# Patient Record
Sex: MALE | Race: WHITE | Employment: OTHER | URBAN - METROPOLITAN AREA
[De-identification: names, ages, dates, MRNs, and addresses within clinical notes are randomized per-mention and may not be internally consistent; named-entity substitution may affect disease eponyms.]

---

## 2017-01-31 ENCOUNTER — HOSPITAL ENCOUNTER (OUTPATIENT)
Dept: OTHER | Age: 77
Discharge: HOME | End: 2017-01-31
Attending: INTERNAL MEDICINE | Admitting: INTERNAL MEDICINE

## 2017-01-31 LAB
A/G RATIO: 1.2 (ref 1.1–2.2)
ALBUMIN SERPL-MCNC: 3.5 G/DL (ref 3.4–5)
ALP BLD-CCNC: 75 U/L (ref 40–129)
ALT SERPL-CCNC: 24 U/L (ref 10–40)
ANION GAP SERPL CALCULATED.3IONS-SCNC: 14 MMOL/L (ref 3–16)
AST SERPL-CCNC: 19 U/L (ref 15–37)
BILIRUB SERPL-MCNC: 1 MG/DL (ref 0–1)
BILIRUBIN URINE: ABNORMAL
BLOOD, URINE: NEGATIVE
BUN BLDV-MCNC: 10 MG/DL (ref 7–20)
CALCIUM SERPL-MCNC: 9 MG/DL (ref 8.3–10.6)
CHLORIDE BLD-SCNC: 96 MMOL/L (ref 99–110)
CHOLESTEROL, TOTAL: 167 MG/DL (ref 0–199)
CLARITY: ABNORMAL
CO2: 27 MMOL/L (ref 21–32)
COLOR: ABNORMAL
COMMENT UA: ABNORMAL
CREAT SERPL-MCNC: 0.9 MG/DL (ref 0.8–1.3)
EPITHELIAL CELLS, UA: 5 /HPF (ref 0–5)
GFR AFRICAN AMERICAN: >60
GFR NON-AFRICAN AMERICAN: >60
GLOBULIN: 3 G/DL
GLUCOSE BLD-MCNC: 308 MG/DL (ref 70–99)
GLUCOSE URINE: >=1000 MG/DL
HCT VFR BLD CALC: 49.3 % (ref 40.5–52.5)
HDLC SERPL-MCNC: 63 MG/DL (ref 40–60)
HEMOGLOBIN: 16.7 G/DL (ref 13.5–17.5)
KETONES, URINE: NEGATIVE MG/DL
LDL CHOLESTEROL CALCULATED: 77 MG/DL
LEUKOCYTE ESTERASE, URINE: ABNORMAL
MCH RBC QN AUTO: 30 PG (ref 26–34)
MCHC RBC AUTO-ENTMCNC: 33.9 G/DL (ref 31–36)
MCV RBC AUTO: 88.4 FL (ref 80–100)
MICROSCOPIC EXAMINATION: YES
NITRITE, URINE: NEGATIVE
PDW BLD-RTO: 13.3 % (ref 12.4–15.4)
PH UA: 5.5
PLATELET # BLD: 159 K/UL (ref 135–450)
PMV BLD AUTO: 11 FL (ref 5–10.5)
POTASSIUM SERPL-SCNC: 3.8 MMOL/L (ref 3.5–5.1)
PROSTATE SPECIFIC ANTIGEN: 1.19 NG/ML (ref 0–4)
PROTEIN UA: 30 MG/DL
RBC # BLD: 5.57 M/UL (ref 4.2–5.9)
RBC UA: 4 /HPF (ref 0–4)
SODIUM BLD-SCNC: 137 MMOL/L (ref 136–145)
SPECIFIC GRAVITY UA: 1.03
TOTAL PROTEIN: 6.5 G/DL (ref 6.4–8.2)
TRIGL SERPL-MCNC: 134 MG/DL (ref 0–150)
TSH SERPL DL<=0.05 MIU/L-ACNC: 11.65 UIU/ML (ref 0.27–4.2)
URINE TYPE: ABNORMAL
UROBILINOGEN, URINE: 0.2 E.U./DL
VITAMIN D 25-HYDROXY: 35.4 NG/ML
VLDLC SERPL CALC-MCNC: 27 MG/DL
WBC # BLD: 7.5 K/UL (ref 4–11)
WBC UA: 15 /HPF (ref 0–5)

## 2017-02-01 LAB
ESTIMATED AVERAGE GLUCOSE: 294.8 MG/DL
HBA1C MFR BLD: 11.9 %

## 2017-06-05 ENCOUNTER — HOSPITAL ENCOUNTER (OUTPATIENT)
Dept: OTHER | Age: 77
Discharge: HOME | End: 2017-06-05
Attending: INTERNAL MEDICINE | Admitting: INTERNAL MEDICINE

## 2017-06-05 LAB — TSH SERPL DL<=0.05 MIU/L-ACNC: 5.87 UIU/ML (ref 0.27–4.2)

## 2017-06-06 LAB
ESTIMATED AVERAGE GLUCOSE: 269 MG/DL
HBA1C MFR BLD: 11 %

## 2017-10-27 ENCOUNTER — HOSPITAL ENCOUNTER (OUTPATIENT)
Dept: OTHER | Age: 77
Discharge: HOME | End: 2017-10-27
Attending: INTERNAL MEDICINE | Admitting: INTERNAL MEDICINE

## 2017-10-27 LAB
A/G RATIO: 1.2 (ref 1.1–2.2)
ALBUMIN SERPL-MCNC: 3.7 G/DL (ref 3.4–5)
ALP BLD-CCNC: 77 U/L (ref 40–129)
ALT SERPL-CCNC: 22 U/L (ref 10–40)
ANION GAP SERPL CALCULATED.3IONS-SCNC: 14 MMOL/L (ref 3–16)
AST SERPL-CCNC: 15 U/L (ref 15–37)
BILIRUB SERPL-MCNC: 0.9 MG/DL (ref 0–1)
BILIRUBIN URINE: NEGATIVE
BLOOD, URINE: NEGATIVE
BUN BLDV-MCNC: 13 MG/DL (ref 7–20)
CALCIUM SERPL-MCNC: 9 MG/DL (ref 8.3–10.6)
CHLORIDE BLD-SCNC: 96 MMOL/L (ref 99–110)
CHOLESTEROL, TOTAL: 187 MG/DL (ref 0–199)
CLARITY: CLEAR
CO2: 27 MMOL/L (ref 21–32)
COLOR: YELLOW
CREAT SERPL-MCNC: 1.1 MG/DL (ref 0.8–1.3)
GFR AFRICAN AMERICAN: >60
GFR NON-AFRICAN AMERICAN: >60
GLOBULIN: 3.1 G/DL
GLUCOSE BLD-MCNC: 320 MG/DL (ref 70–99)
GLUCOSE URINE: >=1000 MG/DL
HDLC SERPL-MCNC: 56 MG/DL (ref 40–60)
KETONES, URINE: NEGATIVE MG/DL
LDL CHOLESTEROL CALCULATED: 103 MG/DL
LEUKOCYTE ESTERASE, URINE: NEGATIVE
MICROSCOPIC EXAMINATION: ABNORMAL
NITRITE, URINE: NEGATIVE
PH UA: 5.5
POTASSIUM SERPL-SCNC: 4 MMOL/L (ref 3.5–5.1)
PROSTATE SPECIFIC ANTIGEN: 1.08 NG/ML (ref 0–4)
PROTEIN UA: NEGATIVE MG/DL
SODIUM BLD-SCNC: 137 MMOL/L (ref 136–145)
SPECIFIC GRAVITY UA: 1.03
TOTAL PROTEIN: 6.8 G/DL (ref 6.4–8.2)
TRIGL SERPL-MCNC: 138 MG/DL (ref 0–150)
TSH SERPL DL<=0.05 MIU/L-ACNC: 6.39 UIU/ML (ref 0.27–4.2)
URINE TYPE: ABNORMAL
UROBILINOGEN, URINE: 0.2 E.U./DL
VLDLC SERPL CALC-MCNC: 28 MG/DL

## 2017-10-28 LAB
ESTIMATED AVERAGE GLUCOSE: 292 MG/DL
HBA1C MFR BLD: 11.8 %

## 2018-02-06 ENCOUNTER — HOSPITAL ENCOUNTER (OUTPATIENT)
Dept: OTHER | Age: 78
Discharge: HOME | End: 2018-02-06
Attending: INTERNAL MEDICINE | Admitting: INTERNAL MEDICINE

## 2018-02-07 LAB
ESTIMATED AVERAGE GLUCOSE: 246 MG/DL
HBA1C MFR BLD: 10.2 %

## 2018-05-20 PROBLEM — K81.0 ACUTE CHOLECYSTITIS: Status: ACTIVE | Noted: 2018-05-20

## 2018-05-20 PROBLEM — K85.10 ACUTE GALLSTONE PANCREATITIS: Status: ACTIVE | Noted: 2018-05-20

## 2018-05-20 PROBLEM — K21.9 GERD (GASTROESOPHAGEAL REFLUX DISEASE): Status: ACTIVE | Noted: 2018-05-20

## 2018-06-07 ENCOUNTER — OFFICE VISIT (OUTPATIENT)
Dept: SURGERY | Age: 78
End: 2018-06-07

## 2018-06-07 VITALS — SYSTOLIC BLOOD PRESSURE: 112 MMHG | WEIGHT: 223 LBS | DIASTOLIC BLOOD PRESSURE: 62 MMHG | BODY MASS INDEX: 31.1 KG/M2

## 2018-06-07 DIAGNOSIS — K81.0 ACUTE CHOLECYSTITIS: Primary | ICD-10-CM

## 2018-06-07 DIAGNOSIS — K85.10 ACUTE GALLSTONE PANCREATITIS: ICD-10-CM

## 2018-06-07 DIAGNOSIS — K81.0 ACUTE CHOLECYSTITIS: ICD-10-CM

## 2018-06-07 LAB
A/G RATIO: 1.2 (ref 1.1–2.2)
ALBUMIN SERPL-MCNC: 3.8 G/DL (ref 3.4–5)
ALP BLD-CCNC: 87 U/L (ref 40–129)
ALT SERPL-CCNC: 20 U/L (ref 10–40)
ANION GAP SERPL CALCULATED.3IONS-SCNC: 18 MMOL/L (ref 3–16)
AST SERPL-CCNC: <5 U/L (ref 15–37)
BASOPHILS ABSOLUTE: 0.2 K/UL (ref 0–0.2)
BASOPHILS RELATIVE PERCENT: 2.2 %
BILIRUB SERPL-MCNC: 0.8 MG/DL (ref 0–1)
BUN BLDV-MCNC: 17 MG/DL (ref 7–20)
CALCIUM SERPL-MCNC: 9.2 MG/DL (ref 8.3–10.6)
CHLORIDE BLD-SCNC: 91 MMOL/L (ref 99–110)
CO2: 25 MMOL/L (ref 21–32)
CREAT SERPL-MCNC: 1.1 MG/DL (ref 0.8–1.3)
EOSINOPHILS ABSOLUTE: 0.4 K/UL (ref 0–0.6)
EOSINOPHILS RELATIVE PERCENT: 4.1 %
GFR AFRICAN AMERICAN: >60
GFR NON-AFRICAN AMERICAN: >60
GLOBULIN: 3.3 G/DL
GLUCOSE BLD-MCNC: 308 MG/DL (ref 70–99)
HCT VFR BLD CALC: 48.8 % (ref 40.5–52.5)
HEMOGLOBIN: 16.9 G/DL (ref 13.5–17.5)
LIPASE: 90 U/L (ref 13–60)
LYMPHOCYTES ABSOLUTE: 2 K/UL (ref 1–5.1)
LYMPHOCYTES RELATIVE PERCENT: 23.1 %
MCH RBC QN AUTO: 31 PG (ref 26–34)
MCHC RBC AUTO-ENTMCNC: 34.7 G/DL (ref 31–36)
MCV RBC AUTO: 89.5 FL (ref 80–100)
MONOCYTES ABSOLUTE: 0.6 K/UL (ref 0–1.3)
MONOCYTES RELATIVE PERCENT: 6.4 %
NEUTROPHILS ABSOLUTE: 5.6 K/UL (ref 1.7–7.7)
NEUTROPHILS RELATIVE PERCENT: 64.2 %
PDW BLD-RTO: 13.6 % (ref 12.4–15.4)
PLATELET # BLD: 441 K/UL (ref 135–450)
PMV BLD AUTO: 9 FL (ref 5–10.5)
POTASSIUM SERPL-SCNC: 4.2 MMOL/L (ref 3.5–5.1)
RBC # BLD: 5.45 M/UL (ref 4.2–5.9)
SODIUM BLD-SCNC: 134 MMOL/L (ref 136–145)
TOTAL PROTEIN: 7.1 G/DL (ref 6.4–8.2)
WBC # BLD: 8.7 K/UL (ref 4–11)

## 2018-06-07 PROCEDURE — 99024 POSTOP FOLLOW-UP VISIT: CPT | Performed by: SURGERY

## 2018-08-15 ENCOUNTER — HOSPITAL ENCOUNTER (OUTPATIENT)
Dept: OTHER | Age: 78
Discharge: HOME | End: 2018-08-15
Attending: INTERNAL MEDICINE | Admitting: INTERNAL MEDICINE

## 2018-08-16 LAB
ESTIMATED AVERAGE GLUCOSE: 246 MG/DL
HBA1C MFR BLD: 10.2 %

## 2019-02-13 ENCOUNTER — HOSPITAL ENCOUNTER (OUTPATIENT)
Age: 79
Discharge: HOME | End: 2019-02-13
Payer: MEDICARE

## 2019-02-13 LAB
BILIRUBIN URINE: NEGATIVE
BLOOD, URINE: NEGATIVE
CHOLESTEROL, TOTAL: 181 MG/DL (ref 0–199)
CLARITY: ABNORMAL
COLOR: YELLOW
EPITHELIAL CELLS, UA: 5 /HPF (ref 0–5)
GLUCOSE URINE: NEGATIVE MG/DL
HCT VFR BLD CALC: 49.7 % (ref 40.5–52.5)
HDLC SERPL-MCNC: 56 MG/DL (ref 40–60)
HEMOGLOBIN: 17.2 G/DL (ref 13.5–17.5)
HYALINE CASTS: 6 /LPF (ref 0–8)
KETONES, URINE: NEGATIVE MG/DL
LDL CHOLESTEROL CALCULATED: 97 MG/DL
LEUKOCYTE ESTERASE, URINE: ABNORMAL
MCH RBC QN AUTO: 30.7 PG (ref 26–34)
MCHC RBC AUTO-ENTMCNC: 34.5 G/DL (ref 31–36)
MCV RBC AUTO: 89 FL (ref 80–100)
MICROSCOPIC EXAMINATION: YES
NITRITE, URINE: NEGATIVE
PDW BLD-RTO: 13.5 % (ref 12.4–15.4)
PH UA: 5.5
PLATELET # BLD: 204 K/UL (ref 135–450)
PMV BLD AUTO: 10 FL (ref 5–10.5)
PROSTATE SPECIFIC ANTIGEN: 1.66 NG/ML (ref 0–4)
PROTEIN UA: ABNORMAL MG/DL
RBC # BLD: 5.58 M/UL (ref 4.2–5.9)
RBC UA: 3 /HPF (ref 0–4)
SPECIFIC GRAVITY UA: 1.02
TRIGL SERPL-MCNC: 139 MG/DL (ref 0–150)
URINE TYPE: ABNORMAL
UROBILINOGEN, URINE: 0.2 E.U./DL
VITAMIN D 25-HYDROXY: 37.6 NG/ML
VLDLC SERPL CALC-MCNC: 28 MG/DL
WBC # BLD: 7.1 K/UL (ref 4–11)
WBC UA: 10 /HPF (ref 0–5)

## 2019-02-13 PROCEDURE — 83036 HEMOGLOBIN GLYCOSYLATED A1C: CPT

## 2019-02-13 PROCEDURE — 80053 COMPREHEN METABOLIC PANEL: CPT

## 2019-02-13 PROCEDURE — 84153 ASSAY OF PSA TOTAL: CPT

## 2019-02-13 PROCEDURE — 36415 COLL VENOUS BLD VENIPUNCTURE: CPT

## 2019-02-13 PROCEDURE — 85027 COMPLETE CBC AUTOMATED: CPT

## 2019-02-13 PROCEDURE — 81001 URINALYSIS AUTO W/SCOPE: CPT

## 2019-02-13 PROCEDURE — 80061 LIPID PANEL: CPT

## 2019-02-13 PROCEDURE — 82306 VITAMIN D 25 HYDROXY: CPT

## 2019-02-14 LAB
A/G RATIO: 1.2 (ref 1.1–2.2)
ALBUMIN SERPL-MCNC: 3.8 G/DL (ref 3.4–5)
ALP BLD-CCNC: 77 U/L (ref 40–129)
ALT SERPL-CCNC: 34 U/L (ref 10–40)
ANION GAP SERPL CALCULATED.3IONS-SCNC: 19 MMOL/L (ref 3–16)
AST SERPL-CCNC: 29 U/L (ref 15–37)
BILIRUB SERPL-MCNC: 1.5 MG/DL (ref 0–1)
BUN BLDV-MCNC: 11 MG/DL (ref 7–20)
CALCIUM SERPL-MCNC: 9.6 MG/DL (ref 8.3–10.6)
CHLORIDE BLD-SCNC: 94 MMOL/L (ref 99–110)
CO2: 23 MMOL/L (ref 21–32)
CREAT SERPL-MCNC: 1.1 MG/DL (ref 0.8–1.3)
ESTIMATED AVERAGE GLUCOSE: 203 MG/DL
GFR AFRICAN AMERICAN: >60
GFR NON-AFRICAN AMERICAN: >60
GLOBULIN: 3.1 G/DL
GLUCOSE BLD-MCNC: 94 MG/DL (ref 70–99)
HBA1C MFR BLD: 8.7 %
POTASSIUM SERPL-SCNC: 4.1 MMOL/L (ref 3.5–5.1)
SODIUM BLD-SCNC: 136 MMOL/L (ref 136–145)
TOTAL PROTEIN: 6.9 G/DL (ref 6.4–8.2)

## 2019-04-26 ENCOUNTER — HOSPITAL ENCOUNTER (OUTPATIENT)
Dept: GENERAL RADIOLOGY | Age: 79
Discharge: HOME | End: 2019-04-26
Payer: MEDICARE

## 2019-04-26 ENCOUNTER — HOSPITAL ENCOUNTER (OUTPATIENT)
Age: 79
Discharge: HOME | End: 2019-04-26
Payer: MEDICARE

## 2019-04-26 ENCOUNTER — HOSPITAL ENCOUNTER (OUTPATIENT)
Dept: VASCULAR LAB | Age: 79
Discharge: HOME | End: 2019-04-26
Payer: MEDICARE

## 2019-04-26 DIAGNOSIS — M51.36 LUMBAR DISC NARROWING: ICD-10-CM

## 2019-04-26 DIAGNOSIS — I82.403 DEEP VEIN THROMBOSIS (DVT) OF BOTH LOWER EXTREMITIES, UNSPECIFIED CHRONICITY, UNSPECIFIED VEIN (HCC): Primary | ICD-10-CM

## 2019-04-26 PROCEDURE — 93970 EXTREMITY STUDY: CPT

## 2019-04-26 PROCEDURE — 72100 X-RAY EXAM L-S SPINE 2/3 VWS: CPT

## 2019-05-02 ENCOUNTER — HOSPITAL ENCOUNTER (OUTPATIENT)
Dept: VASCULAR LAB | Age: 79
Discharge: HOME | End: 2019-05-02
Payer: MEDICARE

## 2019-05-02 DIAGNOSIS — I73.9 INTERMITTENT CLAUDICATION (HCC): Primary | ICD-10-CM

## 2019-05-02 PROCEDURE — 93923 UPR/LXTR ART STDY 3+ LVLS: CPT

## 2019-07-30 ENCOUNTER — HOSPITAL ENCOUNTER (OUTPATIENT)
Age: 79
Discharge: HOME | End: 2019-07-30
Payer: MEDICARE

## 2019-07-30 PROCEDURE — 83036 HEMOGLOBIN GLYCOSYLATED A1C: CPT

## 2019-07-30 PROCEDURE — 36415 COLL VENOUS BLD VENIPUNCTURE: CPT

## 2019-07-31 LAB
ESTIMATED AVERAGE GLUCOSE: 220.2 MG/DL
HBA1C MFR BLD: 9.3 %

## 2019-11-17 ENCOUNTER — APPOINTMENT (OUTPATIENT)
Dept: CT IMAGING | Age: 79
End: 2019-11-17
Payer: MEDICARE

## 2019-11-17 ENCOUNTER — HOSPITAL ENCOUNTER (EMERGENCY)
Age: 79
Discharge: HOME | End: 2019-11-17
Payer: MEDICARE

## 2019-11-17 VITALS
OXYGEN SATURATION: 99 % | BODY MASS INDEX: 31.46 KG/M2 | RESPIRATION RATE: 14 BRPM | SYSTOLIC BLOOD PRESSURE: 157 MMHG | HEART RATE: 82 BPM | DIASTOLIC BLOOD PRESSURE: 77 MMHG | TEMPERATURE: 98.4 F | WEIGHT: 232 LBS

## 2019-11-17 DIAGNOSIS — S01.01XA LACERATION OF SCALP, INITIAL ENCOUNTER: ICD-10-CM
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
## 2019-11-17 PROCEDURE — 72125 CT NECK SPINE W/O DYE: CPT

## 2019-11-17 PROCEDURE — 70450 CT HEAD/BRAIN W/O DYE: CPT

## 2019-11-17 PROCEDURE — 6370000000 HC RX 637 (ALT 250 FOR IP)

## 2019-11-17 PROCEDURE — 99284 EMERGENCY DEPT VISIT MOD MDM: CPT

## 2019-11-17 PROCEDURE — 4500000024 HC ED LEVEL 4 PROCEDURE

## 2019-11-17 PROCEDURE — 6370000000 HC RX 637 (ALT 250 FOR IP): Performed by: PHYSICIAN ASSISTANT

## 2019-11-17 RX ORDER — ONDANSETRON 4 MG/1
8 TABLET, ORALLY DISINTEGRATING ORAL ONCE
Status: COMPLETED | OUTPATIENT
Start: 2019-11-17 | End: 2019-11-17

## 2019-11-17 RX ORDER — HYDROCODONE BITARTRATE AND ACETAMINOPHEN 5; 325 MG/1; MG/1
1 TABLET ORAL ONCE
Status: COMPLETED | OUTPATIENT
Start: 2019-11-17 | End: 2019-11-17

## 2019-11-17 RX ADMIN — Medication 3 ML: at 13:20

## 2019-11-17 RX ADMIN — HYDROCODONE BITARTRATE AND ACETAMINOPHEN 1 TABLET: 5; 325 TABLET ORAL at 13:19

## 2019-11-17 RX ADMIN — ONDANSETRON 8 MG: 4 TABLET, ORALLY DISINTEGRATING ORAL at 13:19

## 2019-11-17 ASSESSMENT — ENCOUNTER SYMPTOMS
VOMITING: 0
DIARRHEA: 0
RESPIRATORY NEGATIVE: 1
COUGH: 0
COLOR CHANGE: 0
SHORTNESS OF BREATH: 0
ABDOMINAL PAIN: 0
CONSTIPATION: 0
NAUSEA: 0
PHOTOPHOBIA: 0

## 2019-11-17 ASSESSMENT — PAIN SCALES - GENERAL
PAINLEVEL_OUTOF10: 5
PAINLEVEL_OUTOF10: 5

## 2019-11-17 ASSESSMENT — PAIN DESCRIPTION - LOCATION: LOCATION: HEAD

## 2019-11-17 ASSESSMENT — PAIN DESCRIPTION - PAIN TYPE: TYPE: ACUTE PAIN

## 2020-08-18 ENCOUNTER — HOSPITAL ENCOUNTER (OUTPATIENT)
Age: 80
Discharge: HOME | End: 2020-08-18
Payer: MEDICARE

## 2020-08-18 LAB
A/G RATIO: 1.2 (ref 1.1–2.2)
ALBUMIN SERPL-MCNC: 4.2 G/DL (ref 3.4–5)
ALP BLD-CCNC: 84 U/L (ref 40–129)
ALT SERPL-CCNC: 30 U/L (ref 10–40)
ANION GAP SERPL CALCULATED.3IONS-SCNC: 11 MMOL/L (ref 3–16)
AST SERPL-CCNC: 23 U/L (ref 15–37)
BILIRUB SERPL-MCNC: 1.4 MG/DL (ref 0–1)
BILIRUBIN URINE: NEGATIVE
BLOOD, URINE: NEGATIVE
BUN BLDV-MCNC: 17 MG/DL (ref 7–20)
CALCIUM SERPL-MCNC: 9.4 MG/DL (ref 8.3–10.6)
CHLORIDE BLD-SCNC: 92 MMOL/L (ref 99–110)
CHOLESTEROL, TOTAL: 197 MG/DL (ref 0–199)
CLARITY: CLEAR
CO2: 31 MMOL/L (ref 21–32)
COLOR: YELLOW
CREAT SERPL-MCNC: 1 MG/DL (ref 0.8–1.3)
EPITHELIAL CELLS, UA: 1 /HPF (ref 0–5)
GFR AFRICAN AMERICAN: >60
GFR NON-AFRICAN AMERICAN: >60
GLOBULIN: 3.4 G/DL
GLUCOSE BLD-MCNC: 253 MG/DL (ref 70–99)
GLUCOSE URINE: NEGATIVE MG/DL
HCT VFR BLD CALC: 52.5 % (ref 40.5–52.5)
HDLC SERPL-MCNC: 63 MG/DL (ref 40–60)
HEMOGLOBIN: 17.8 G/DL (ref 13.5–17.5)
HYALINE CASTS: 1 /LPF (ref 0–8)
KETONES, URINE: NEGATIVE MG/DL
LDL CHOLESTEROL CALCULATED: 108 MG/DL
LEUKOCYTE ESTERASE, URINE: ABNORMAL
MCH RBC QN AUTO: 29.5 PG (ref 26–34)
MCHC RBC AUTO-ENTMCNC: 33.9 G/DL (ref 31–36)
MCV RBC AUTO: 87 FL (ref 80–100)
MICROSCOPIC EXAMINATION: YES
NITRITE, URINE: NEGATIVE
PDW BLD-RTO: 13.6 % (ref 12.4–15.4)
PH UA: 6 (ref 5–8)
PLATELET # BLD: 191 K/UL (ref 135–450)
PMV BLD AUTO: 10.4 FL (ref 5–10.5)
POTASSIUM SERPL-SCNC: 3.2 MMOL/L (ref 3.5–5.1)
PROSTATE SPECIFIC ANTIGEN: 1.55 NG/ML (ref 0–4)
PROTEIN UA: NEGATIVE MG/DL
RBC # BLD: 6.03 M/UL (ref 4.2–5.9)
RBC UA: 2 /HPF (ref 0–4)
SODIUM BLD-SCNC: 134 MMOL/L (ref 136–145)
SPECIFIC GRAVITY UA: 1.02 (ref 1–1.03)
TOTAL PROTEIN: 7.6 G/DL (ref 6.4–8.2)
TRIGL SERPL-MCNC: 130 MG/DL (ref 0–150)
URINE TYPE: ABNORMAL
UROBILINOGEN, URINE: 0.2 E.U./DL
VITAMIN D 25-HYDROXY: 31 NG/ML
VLDLC SERPL CALC-MCNC: 26 MG/DL
WBC # BLD: 7.4 K/UL (ref 4–11)
WBC UA: 6 /HPF (ref 0–5)

## 2020-08-18 PROCEDURE — 84153 ASSAY OF PSA TOTAL: CPT

## 2020-08-18 PROCEDURE — 81001 URINALYSIS AUTO W/SCOPE: CPT

## 2020-08-18 PROCEDURE — 85027 COMPLETE CBC AUTOMATED: CPT

## 2020-08-18 PROCEDURE — 82306 VITAMIN D 25 HYDROXY: CPT

## 2020-08-18 PROCEDURE — 80061 LIPID PANEL: CPT

## 2020-08-18 PROCEDURE — 80053 COMPREHEN METABOLIC PANEL: CPT

## 2020-10-16 ENCOUNTER — HOSPITAL ENCOUNTER (OUTPATIENT)
Age: 80
Discharge: HOME | End: 2020-10-16
Payer: MEDICARE

## 2020-10-16 LAB
ANION GAP SERPL CALCULATED.3IONS-SCNC: 11 MMOL/L (ref 3–16)
BUN BLDV-MCNC: 13 MG/DL (ref 7–20)
CALCIUM SERPL-MCNC: 9 MG/DL (ref 8.3–10.6)
CHLORIDE BLD-SCNC: 97 MMOL/L (ref 99–110)
CO2: 31 MMOL/L (ref 21–32)
CREAT SERPL-MCNC: 1 MG/DL (ref 0.8–1.3)
GFR AFRICAN AMERICAN: >60
GFR NON-AFRICAN AMERICAN: >60
GLUCOSE BLD-MCNC: 100 MG/DL (ref 70–99)
POTASSIUM SERPL-SCNC: 3.3 MMOL/L (ref 3.5–5.1)
SODIUM BLD-SCNC: 139 MMOL/L (ref 136–145)

## 2020-10-16 PROCEDURE — 36415 COLL VENOUS BLD VENIPUNCTURE: CPT

## 2020-10-16 PROCEDURE — 80048 BASIC METABOLIC PNL TOTAL CA: CPT

## 2020-10-16 PROCEDURE — 83036 HEMOGLOBIN GLYCOSYLATED A1C: CPT

## 2020-10-17 LAB
ESTIMATED AVERAGE GLUCOSE: 214.5 MG/DL
HBA1C MFR BLD: 9.1 %

## 2021-09-02 ENCOUNTER — HOSPITAL ENCOUNTER (OUTPATIENT)
Age: 81
Discharge: HOME | End: 2021-09-02
Payer: MEDICARE

## 2021-09-02 LAB
A/G RATIO: 1.3 (ref 1.1–2.2)
ALBUMIN SERPL-MCNC: 3.7 G/DL (ref 3.4–5)
ALP BLD-CCNC: 75 U/L (ref 40–129)
ALT SERPL-CCNC: 28 U/L (ref 10–40)
ANION GAP SERPL CALCULATED.3IONS-SCNC: 13 MMOL/L (ref 3–16)
AST SERPL-CCNC: 29 U/L (ref 15–37)
BILIRUB SERPL-MCNC: 1 MG/DL (ref 0–1)
BILIRUBIN URINE: NEGATIVE
BLOOD, URINE: NEGATIVE
BUN BLDV-MCNC: 14 MG/DL (ref 7–20)
CALCIUM SERPL-MCNC: 8.5 MG/DL (ref 8.3–10.6)
CHLORIDE BLD-SCNC: 98 MMOL/L (ref 99–110)
CHOLESTEROL, TOTAL: 155 MG/DL (ref 0–199)
CLARITY: CLEAR
CO2: 28 MMOL/L (ref 21–32)
COLOR: YELLOW
CREAT SERPL-MCNC: 1.1 MG/DL (ref 0.8–1.3)
GFR AFRICAN AMERICAN: >60
GFR NON-AFRICAN AMERICAN: >60
GLOBULIN: 2.9 G/DL
GLUCOSE BLD-MCNC: 88 MG/DL (ref 70–99)
GLUCOSE URINE: NEGATIVE MG/DL
HCT VFR BLD CALC: 47.6 % (ref 40.5–52.5)
HDLC SERPL-MCNC: 48 MG/DL (ref 40–60)
HEMOGLOBIN: 16.2 G/DL (ref 13.5–17.5)
KETONES, URINE: NEGATIVE MG/DL
LDL CHOLESTEROL CALCULATED: 83 MG/DL
LEUKOCYTE ESTERASE, URINE: NEGATIVE
MCH RBC QN AUTO: 30.4 PG (ref 26–34)
MCHC RBC AUTO-ENTMCNC: 34.1 G/DL (ref 31–36)
MCV RBC AUTO: 89.1 FL (ref 80–100)
MICROSCOPIC EXAMINATION: NORMAL
NITRITE, URINE: NEGATIVE
PDW BLD-RTO: 13.6 % (ref 12.4–15.4)
PH UA: 5.5 (ref 5–8)
PLATELET # BLD: 169 K/UL (ref 135–450)
PMV BLD AUTO: 9.7 FL (ref 5–10.5)
POTASSIUM SERPL-SCNC: 3.3 MMOL/L (ref 3.5–5.1)
PROSTATE SPECIFIC ANTIGEN: 1.38 NG/ML (ref 0–4)
PROTEIN UA: NEGATIVE MG/DL
RBC # BLD: 5.33 M/UL (ref 4.2–5.9)
SODIUM BLD-SCNC: 139 MMOL/L (ref 136–145)
SPECIFIC GRAVITY UA: 1.02 (ref 1–1.03)
TOTAL PROTEIN: 6.6 G/DL (ref 6.4–8.2)
TRIGL SERPL-MCNC: 122 MG/DL (ref 0–150)
TSH SERPL DL<=0.05 MIU/L-ACNC: 16.14 UIU/ML (ref 0.27–4.2)
URINE TYPE: NORMAL
UROBILINOGEN, URINE: 0.2 E.U./DL
VITAMIN D 25-HYDROXY: 25.8 NG/ML
VLDLC SERPL CALC-MCNC: 24 MG/DL
WBC # BLD: 6.4 K/UL (ref 4–11)

## 2021-09-02 PROCEDURE — 83036 HEMOGLOBIN GLYCOSYLATED A1C: CPT

## 2021-09-02 PROCEDURE — 82306 VITAMIN D 25 HYDROXY: CPT

## 2021-09-02 PROCEDURE — 80053 COMPREHEN METABOLIC PANEL: CPT

## 2021-09-02 PROCEDURE — 36415 COLL VENOUS BLD VENIPUNCTURE: CPT

## 2021-09-02 PROCEDURE — 81003 URINALYSIS AUTO W/O SCOPE: CPT

## 2021-09-02 PROCEDURE — 84443 ASSAY THYROID STIM HORMONE: CPT

## 2021-09-02 PROCEDURE — 84153 ASSAY OF PSA TOTAL: CPT

## 2021-09-02 PROCEDURE — 85027 COMPLETE CBC AUTOMATED: CPT

## 2021-09-02 PROCEDURE — 80061 LIPID PANEL: CPT

## 2021-09-03 LAB
ESTIMATED AVERAGE GLUCOSE: 180 MG/DL
HBA1C MFR BLD: 7.9 %

## 2022-07-06 PROBLEM — N40.1 BENIGN PROSTATIC HYPERPLASIA WITH URINARY FREQUENCY: Status: ACTIVE | Noted: 2022-07-06

## 2022-07-06 PROBLEM — R35.0 BENIGN PROSTATIC HYPERPLASIA WITH URINARY FREQUENCY: Status: ACTIVE | Noted: 2022-07-06

## 2022-07-27 ENCOUNTER — HOSPITAL ENCOUNTER (OUTPATIENT)
Age: 82
Discharge: HOME | End: 2022-07-27
Payer: MEDICARE

## 2022-07-27 LAB
A/G RATIO: 1.6 (ref 1.1–2.2)
ALBUMIN SERPL-MCNC: 4.1 G/DL (ref 3.4–5)
ALP BLD-CCNC: 68 U/L (ref 40–129)
ALT SERPL-CCNC: 50 U/L (ref 10–40)
ANION GAP SERPL CALCULATED.3IONS-SCNC: 10 MMOL/L (ref 3–16)
AST SERPL-CCNC: 32 U/L (ref 15–37)
BACTERIA: ABNORMAL /HPF
BILIRUB SERPL-MCNC: 1.2 MG/DL (ref 0–1)
BILIRUBIN URINE: NEGATIVE
BLOOD, URINE: NEGATIVE
BUN BLDV-MCNC: 17 MG/DL (ref 7–20)
CALCIUM SERPL-MCNC: 9.3 MG/DL (ref 8.3–10.6)
CHLORIDE BLD-SCNC: 97 MMOL/L (ref 99–110)
CHOLESTEROL, TOTAL: 287 MG/DL (ref 0–199)
CLARITY: CLEAR
CO2: 29 MMOL/L (ref 21–32)
COLOR: YELLOW
CREAT SERPL-MCNC: 1.2 MG/DL (ref 0.8–1.3)
EPITHELIAL CELLS, UA: 6 /HPF (ref 0–5)
GFR AFRICAN AMERICAN: >60
GFR NON-AFRICAN AMERICAN: 58
GLUCOSE BLD-MCNC: 240 MG/DL (ref 70–99)
GLUCOSE URINE: NEGATIVE MG/DL
HCT VFR BLD CALC: 48.1 % (ref 40.5–52.5)
HDLC SERPL-MCNC: 55 MG/DL (ref 40–60)
HEMOGLOBIN: 16.7 G/DL (ref 13.5–17.5)
HYALINE CASTS: 1 /LPF (ref 0–8)
KETONES, URINE: NEGATIVE MG/DL
LDL CHOLESTEROL CALCULATED: 197 MG/DL
LEUKOCYTE ESTERASE, URINE: ABNORMAL
MCH RBC QN AUTO: 31.1 PG (ref 26–34)
MCHC RBC AUTO-ENTMCNC: 34.8 G/DL (ref 31–36)
MCV RBC AUTO: 89.3 FL (ref 80–100)
MICROSCOPIC EXAMINATION: YES
NITRITE, URINE: NEGATIVE
PDW BLD-RTO: 13.6 % (ref 12.4–15.4)
PH UA: 5.5 (ref 5–8)
PLATELET # BLD: 184 K/UL (ref 135–450)
PMV BLD AUTO: 9.7 FL (ref 5–10.5)
POTASSIUM SERPL-SCNC: 4.6 MMOL/L (ref 3.5–5.1)
PROTEIN UA: ABNORMAL MG/DL
RBC # BLD: 5.39 M/UL (ref 4.2–5.9)
RBC UA: 2 /HPF (ref 0–4)
SODIUM BLD-SCNC: 136 MMOL/L (ref 136–145)
SPECIFIC GRAVITY UA: 1.02 (ref 1–1.03)
TOTAL PROTEIN: 6.7 G/DL (ref 6.4–8.2)
TRIGL SERPL-MCNC: 176 MG/DL (ref 0–150)
TSH SERPL DL<=0.05 MIU/L-ACNC: 6.36 UIU/ML (ref 0.27–4.2)
URINE TYPE: ABNORMAL
UROBILINOGEN, URINE: 1 E.U./DL
VLDLC SERPL CALC-MCNC: 35 MG/DL
WBC # BLD: 5.3 K/UL (ref 4–11)
WBC UA: 1 /HPF (ref 0–5)

## 2022-07-27 PROCEDURE — 83036 HEMOGLOBIN GLYCOSYLATED A1C: CPT

## 2022-07-27 PROCEDURE — 84443 ASSAY THYROID STIM HORMONE: CPT

## 2022-07-27 PROCEDURE — 81001 URINALYSIS AUTO W/SCOPE: CPT

## 2022-07-27 PROCEDURE — 80053 COMPREHEN METABOLIC PANEL: CPT

## 2022-07-27 PROCEDURE — 85027 COMPLETE CBC AUTOMATED: CPT

## 2022-07-27 PROCEDURE — 80061 LIPID PANEL: CPT

## 2022-07-28 LAB
ESTIMATED AVERAGE GLUCOSE: 165.7 MG/DL
HBA1C MFR BLD: 7.4 %

## 2022-09-15 ENCOUNTER — APPOINTMENT (OUTPATIENT)
Dept: CT IMAGING | Age: 82
End: 2022-09-15
Payer: MEDICARE

## 2022-09-15 ENCOUNTER — HOSPITAL ENCOUNTER (OUTPATIENT)
Age: 82
Setting detail: OBSERVATION
Discharge: HOME | End: 2022-09-17
Attending: EMERGENCY MEDICINE | Admitting: INTERNAL MEDICINE
Payer: MEDICARE

## 2022-09-15 ENCOUNTER — APPOINTMENT (OUTPATIENT)
Dept: GENERAL RADIOLOGY | Age: 82
End: 2022-09-15
Payer: MEDICARE

## 2022-09-15 DIAGNOSIS — R77.8 ELEVATED TROPONIN: ICD-10-CM

## 2022-09-15 DIAGNOSIS — R42 DIZZINESS: Primary | ICD-10-CM

## 2022-09-15 DIAGNOSIS — I65.23 STENOSIS OF BOTH INTERNAL CAROTID ARTERIES: ICD-10-CM

## 2022-09-15 LAB
A/G RATIO: 1.4 (ref 1.1–2.2)
ALBUMIN SERPL-MCNC: 3.9 G/DL (ref 3.4–5)
ALP BLD-CCNC: 60 U/L (ref 40–129)
ALT SERPL-CCNC: 37 U/L (ref 10–40)
AMPHETAMINE SCREEN, URINE: NORMAL
ANION GAP SERPL CALCULATED.3IONS-SCNC: 11 MMOL/L (ref 3–16)
APTT: 32.6 SEC (ref 23–34.3)
AST SERPL-CCNC: 40 U/L (ref 15–37)
BARBITURATE SCREEN URINE: NORMAL
BASOPHILS ABSOLUTE: 0.1 K/UL (ref 0–0.2)
BASOPHILS RELATIVE PERCENT: 2.7 %
BENZODIAZEPINE SCREEN, URINE: NORMAL
BILIRUB SERPL-MCNC: 1.1 MG/DL (ref 0–1)
BILIRUBIN URINE: NEGATIVE
BLOOD, URINE: NEGATIVE
BUN BLDV-MCNC: 16 MG/DL (ref 7–20)
CALCIUM SERPL-MCNC: 8.9 MG/DL (ref 8.3–10.6)
CANNABINOID SCREEN URINE: NORMAL
CHLORIDE BLD-SCNC: 103 MMOL/L (ref 99–110)
CLARITY: CLEAR
CO2: 26 MMOL/L (ref 21–32)
COCAINE METABOLITE SCREEN URINE: NORMAL
COLOR: YELLOW
CREAT SERPL-MCNC: 1 MG/DL (ref 0.8–1.3)
EOSINOPHILS ABSOLUTE: 0.4 K/UL (ref 0–0.6)
EOSINOPHILS RELATIVE PERCENT: 8.5 %
ETHANOL: NORMAL MG/DL (ref 0–0.08)
FENTANYL SCREEN, URINE: NORMAL
GFR AFRICAN AMERICAN: >60
GFR NON-AFRICAN AMERICAN: >60
GLUCOSE BLD-MCNC: 134 MG/DL (ref 70–99)
GLUCOSE BLD-MCNC: 152 MG/DL (ref 70–99)
GLUCOSE BLD-MCNC: 98 MG/DL (ref 70–99)
GLUCOSE URINE: NEGATIVE MG/DL
HCT VFR BLD CALC: 46.6 % (ref 40.5–52.5)
HEMOGLOBIN: 15.5 G/DL (ref 13.5–17.5)
INR BLD: 1.1 (ref 0.87–1.14)
KETONES, URINE: NEGATIVE MG/DL
LEUKOCYTE ESTERASE, URINE: NEGATIVE
LYMPHOCYTES ABSOLUTE: 1.4 K/UL (ref 1–5.1)
LYMPHOCYTES RELATIVE PERCENT: 29.2 %
Lab: NORMAL
MCH RBC QN AUTO: 29.8 PG (ref 26–34)
MCHC RBC AUTO-ENTMCNC: 33.3 G/DL (ref 31–36)
MCV RBC AUTO: 89.4 FL (ref 80–100)
METHADONE SCREEN, URINE: NORMAL
MICROSCOPIC EXAMINATION: NORMAL
MONOCYTES ABSOLUTE: 0.3 K/UL (ref 0–1.3)
MONOCYTES RELATIVE PERCENT: 6.8 %
NEUTROPHILS ABSOLUTE: 2.5 K/UL (ref 1.7–7.7)
NEUTROPHILS RELATIVE PERCENT: 52.8 %
NITRITE, URINE: NEGATIVE
OPIATE SCREEN URINE: NORMAL
OXYCODONE URINE: NORMAL
PDW BLD-RTO: 13.5 % (ref 12.4–15.4)
PERFORMED ON: ABNORMAL
PERFORMED ON: NORMAL
PH UA: 5
PH UA: 5 (ref 5–8)
PHENCYCLIDINE SCREEN URINE: NORMAL
PLATELET # BLD: 182 K/UL (ref 135–450)
PMV BLD AUTO: 8.7 FL (ref 5–10.5)
POTASSIUM SERPL-SCNC: 3.8 MMOL/L (ref 3.5–5.1)
PRO-BNP: 221 PG/ML (ref 0–449)
PROTEIN UA: NEGATIVE MG/DL
PROTHROMBIN TIME: 14.1 SEC (ref 11.7–14.5)
RBC # BLD: 5.21 M/UL (ref 4.2–5.9)
SODIUM BLD-SCNC: 140 MMOL/L (ref 136–145)
SPECIFIC GRAVITY UA: >=1.03 (ref 1–1.03)
TOTAL PROTEIN: 6.7 G/DL (ref 6.4–8.2)
TROPONIN: 0.02 NG/ML
URINE REFLEX TO CULTURE: NORMAL
URINE TYPE: NORMAL
UROBILINOGEN, URINE: 0.2 E.U./DL
WBC # BLD: 4.7 K/UL (ref 4–11)

## 2022-09-15 PROCEDURE — 84484 ASSAY OF TROPONIN QUANT: CPT

## 2022-09-15 PROCEDURE — 80307 DRUG TEST PRSMV CHEM ANLYZR: CPT

## 2022-09-15 PROCEDURE — 96374 THER/PROPH/DIAG INJ IV PUSH: CPT

## 2022-09-15 PROCEDURE — G0378 HOSPITAL OBSERVATION PER HR: HCPCS

## 2022-09-15 PROCEDURE — 85610 PROTHROMBIN TIME: CPT

## 2022-09-15 PROCEDURE — 6360000002 HC RX W HCPCS: Performed by: INTERNAL MEDICINE

## 2022-09-15 PROCEDURE — 70496 CT ANGIOGRAPHY HEAD: CPT

## 2022-09-15 PROCEDURE — 80053 COMPREHEN METABOLIC PANEL: CPT

## 2022-09-15 PROCEDURE — 99285 EMERGENCY DEPT VISIT HI MDM: CPT

## 2022-09-15 PROCEDURE — 6370000000 HC RX 637 (ALT 250 FOR IP): Performed by: EMERGENCY MEDICINE

## 2022-09-15 PROCEDURE — 71045 X-RAY EXAM CHEST 1 VIEW: CPT

## 2022-09-15 PROCEDURE — 6360000002 HC RX W HCPCS: Performed by: EMERGENCY MEDICINE

## 2022-09-15 PROCEDURE — 6370000000 HC RX 637 (ALT 250 FOR IP): Performed by: PHYSICIAN ASSISTANT

## 2022-09-15 PROCEDURE — 93005 ELECTROCARDIOGRAM TRACING: CPT | Performed by: EMERGENCY MEDICINE

## 2022-09-15 PROCEDURE — 36415 COLL VENOUS BLD VENIPUNCTURE: CPT

## 2022-09-15 PROCEDURE — 85025 COMPLETE CBC W/AUTO DIFF WBC: CPT

## 2022-09-15 PROCEDURE — 83880 ASSAY OF NATRIURETIC PEPTIDE: CPT

## 2022-09-15 PROCEDURE — 70450 CT HEAD/BRAIN W/O DYE: CPT

## 2022-09-15 PROCEDURE — 96372 THER/PROPH/DIAG INJ SC/IM: CPT

## 2022-09-15 PROCEDURE — 81003 URINALYSIS AUTO W/O SCOPE: CPT

## 2022-09-15 PROCEDURE — 6360000004 HC RX CONTRAST MEDICATION: Performed by: EMERGENCY MEDICINE

## 2022-09-15 PROCEDURE — 82077 ASSAY SPEC XCP UR&BREATH IA: CPT

## 2022-09-15 PROCEDURE — 85730 THROMBOPLASTIN TIME PARTIAL: CPT

## 2022-09-15 RX ORDER — TAMSULOSIN HYDROCHLORIDE 0.4 MG/1
0.4 CAPSULE ORAL DAILY
Status: DISCONTINUED | OUTPATIENT
Start: 2022-09-15 | End: 2022-09-17 | Stop reason: HOSPADM

## 2022-09-15 RX ORDER — ASPIRIN 81 MG/1
324 TABLET, CHEWABLE ORAL ONCE
Status: COMPLETED | OUTPATIENT
Start: 2022-09-15 | End: 2022-09-15

## 2022-09-15 RX ORDER — MECLIZINE HCL 12.5 MG/1
12.5 TABLET ORAL 3 TIMES DAILY PRN
Status: DISCONTINUED | OUTPATIENT
Start: 2022-09-15 | End: 2022-09-17 | Stop reason: HOSPADM

## 2022-09-15 RX ORDER — SENNA PLUS 8.6 MG/1
2 TABLET ORAL DAILY
Status: DISCONTINUED | OUTPATIENT
Start: 2022-09-15 | End: 2022-09-17 | Stop reason: HOSPADM

## 2022-09-15 RX ORDER — DIAZEPAM 5 MG/1
5 TABLET ORAL ONCE
Status: COMPLETED | OUTPATIENT
Start: 2022-09-15 | End: 2022-09-15

## 2022-09-15 RX ORDER — LEVOTHYROXINE SODIUM 0.1 MG/1
100 TABLET ORAL DAILY
Status: DISCONTINUED | OUTPATIENT
Start: 2022-09-16 | End: 2022-09-17 | Stop reason: HOSPADM

## 2022-09-15 RX ORDER — ENOXAPARIN SODIUM 100 MG/ML
30 INJECTION SUBCUTANEOUS DAILY
Status: DISCONTINUED | OUTPATIENT
Start: 2022-09-15 | End: 2022-09-17 | Stop reason: HOSPADM

## 2022-09-15 RX ORDER — DOCUSATE SODIUM 100 MG/1
300 CAPSULE, LIQUID FILLED ORAL DAILY
Status: DISCONTINUED | OUTPATIENT
Start: 2022-09-15 | End: 2022-09-17 | Stop reason: HOSPADM

## 2022-09-15 RX ORDER — METOLAZONE 2.5 MG/1
10 TABLET ORAL DAILY
Status: DISCONTINUED | OUTPATIENT
Start: 2022-09-15 | End: 2022-09-17 | Stop reason: HOSPADM

## 2022-09-15 RX ORDER — HYDROCHLOROTHIAZIDE 25 MG/1
25 TABLET ORAL DAILY
Status: DISCONTINUED | OUTPATIENT
Start: 2022-09-15 | End: 2022-09-16 | Stop reason: ALTCHOICE

## 2022-09-15 RX ORDER — ASPIRIN 81 MG/1
81 TABLET, CHEWABLE ORAL DAILY
Status: DISCONTINUED | OUTPATIENT
Start: 2022-09-15 | End: 2022-09-17 | Stop reason: HOSPADM

## 2022-09-15 RX ORDER — CLOTRIMAZOLE AND BETAMETHASONE DIPROPIONATE 10; .64 MG/G; MG/G
CREAM TOPICAL 2 TIMES DAILY
Status: DISCONTINUED | OUTPATIENT
Start: 2022-09-15 | End: 2022-09-16 | Stop reason: ALTCHOICE

## 2022-09-15 RX ORDER — ATORVASTATIN CALCIUM 40 MG/1
40 TABLET, FILM COATED ORAL DAILY
Status: DISCONTINUED | OUTPATIENT
Start: 2022-09-15 | End: 2022-09-17 | Stop reason: HOSPADM

## 2022-09-15 RX ORDER — MONTELUKAST SODIUM 10 MG/1
10 TABLET ORAL DAILY
Status: DISCONTINUED | OUTPATIENT
Start: 2022-09-15 | End: 2022-09-17 | Stop reason: HOSPADM

## 2022-09-15 RX ORDER — MECLIZINE HCL 12.5 MG/1
25 TABLET ORAL ONCE
Status: COMPLETED | OUTPATIENT
Start: 2022-09-15 | End: 2022-09-15

## 2022-09-15 RX ORDER — SPIRONOLACTONE 25 MG/1
50 TABLET ORAL DAILY
Status: DISCONTINUED | OUTPATIENT
Start: 2022-09-15 | End: 2022-09-17 | Stop reason: HOSPADM

## 2022-09-15 RX ORDER — PANTOPRAZOLE SODIUM 40 MG/1
40 TABLET, DELAYED RELEASE ORAL
Status: DISCONTINUED | OUTPATIENT
Start: 2022-09-16 | End: 2022-09-17 | Stop reason: HOSPADM

## 2022-09-15 RX ORDER — ATENOLOL 50 MG/1
50 TABLET ORAL DAILY
Status: DISCONTINUED | OUTPATIENT
Start: 2022-09-15 | End: 2022-09-16 | Stop reason: ALTCHOICE

## 2022-09-15 RX ORDER — ERGOCALCIFEROL 1.25 MG/1
50000 CAPSULE ORAL WEEKLY
Status: DISCONTINUED | OUTPATIENT
Start: 2022-09-15 | End: 2022-09-16

## 2022-09-15 RX ORDER — ONDANSETRON 2 MG/ML
4 INJECTION INTRAMUSCULAR; INTRAVENOUS
Status: DISCONTINUED | OUTPATIENT
Start: 2022-09-15 | End: 2022-09-17 | Stop reason: HOSPADM

## 2022-09-15 RX ADMIN — MECLIZINE 25 MG: 12.5 TABLET ORAL at 15:24

## 2022-09-15 RX ADMIN — IOPAMIDOL 75 ML: 755 INJECTION, SOLUTION INTRAVENOUS at 14:43

## 2022-09-15 RX ADMIN — ENOXAPARIN SODIUM 30 MG: 100 INJECTION SUBCUTANEOUS at 23:17

## 2022-09-15 RX ADMIN — ONDANSETRON 4 MG: 2 INJECTION INTRAMUSCULAR; INTRAVENOUS at 15:54

## 2022-09-15 RX ADMIN — ASPIRIN 324 MG: 81 TABLET, CHEWABLE ORAL at 15:54

## 2022-09-15 RX ADMIN — DIAZEPAM 5 MG: 5 TABLET ORAL at 15:54

## 2022-09-15 ASSESSMENT — ENCOUNTER SYMPTOMS
BACK PAIN: 0
DIARRHEA: 0
CONSTIPATION: 0
WHEEZING: 0
SHORTNESS OF BREATH: 0
COUGH: 0
ABDOMINAL PAIN: 0
NAUSEA: 1
VOMITING: 0
STRIDOR: 0
COLOR CHANGE: 0

## 2022-09-15 NOTE — PROGRESS NOTES
Pt admitted to 3373. Alert and oriented x 4, c/o dizziness and nausea with position changes. Ambulated to BR and back to bed with contact guard assistance. Daughter and son-in-law at bedside. Bed in lowest position and locked. Reviewed plan of care. Dr. Ervin Caro at bedside.

## 2022-09-15 NOTE — ED PROVIDER NOTES
I independently saw performed a substantive portion of the visit (history, physical, and MDM) on [place]. All diagnostic, treatment, and disposition decisions were made by myself in conjunction with the advanced practice provider. I have participated in the medical decision making and directed the treatment plan and disposition of the patient. For further details of [facility] emergency department encounter, please see the advanced practice provider's documentation. Olu Gonzales MD, am the primary physician provider of record. CHIEF COMPLAINT  Chief Complaint   Patient presents with    Dizziness    Nausea     Pt reports extreme dizziness and nausea, onset 6am this morning. Briefly, Anya Agudelo is a 81 y.o. male  who presents to the ED complaining of dizziness and imbalance, though not room-spinning per se. No LOC. Nausea but has yet to vomit. No headache with it but feels \"strange. \"  Never felt this way before. No chest or abd pain. No numbness or weakness in the arms or legs. No trouble with speech or swallowing. Went to bed at 0430 without sx, and noticed them when going to bathroom at 0600 or so. FOCUSED PHYSICAL EXAMINATION  BP (!) 155/68   Pulse 54   Temp 97.6 °F (36.4 °C) (Oral)   Resp 18   Wt 232 lb (105.2 kg)   SpO2 94%   BMI 31.46 kg/m²    Focused physical examination notable for no acute distress, well-appearing, well-nourished, normal speech and mentation without obvious facial droop, no obvious rash. No obvious cranial nerve deficits on my initial exam. PERRL EOMI, rotary nystagmus noted, no vertical skew deviation, no focal motor/sensory deficits in extremities, somewhat imbalanced and unsteady with standing. Reg rhythm, bradycardia, CTAB. No dysmetria/ataxia on FNF testing. NIH Stroke Scale  Interval: Baseline  Level of Consciousness (1a): Alert  LOC Questions (1b):  Answers both correctly  LOC Commands (1c): Performs both tasks correctly  Best Gaze (2): Normal  Visual (3): No visual loss  Facial Palsy (4): Normal symmetrical movement  Motor Arm, Left (5a): No drift  Motor Arm, Right (5b): No drift  Motor Leg, Left (6a): No drift  Motor Leg, Right (6b): No drift  Limb Ataxia (7): Absent  Sensory (8): Normal  Best Language (9): No aphasia  Dysarthria (10): Normal  Extinction and Inattention (11): No abnormality  Total: 0      The 12 lead EKG was interpreted by me as follows:  Rate: bradycardia with a rate of 53  Rhythm: sinus  Axis: normal  Intervals: first degree AVB narrow QRS normal QTc  ST segments: no ST elevations or depressions  T waves: no abnormal inversions  Non-specific T wave changes: not present  Prior EKG comparison: EKG dated 5/20/18 is not significantly different      MDM:  Diagnostic considerations included benign positional vertigo, labyrinthitis/otitis, sepsis, dehydration/orthostasis, vasovagal reaction, stroke, TIA, intracranial bleed, migraine, anxiety, ACS/dysrhythmia, medication side effects, head trauma    ED course was notable for concern for posterior circulation syndrome with onset of dizziness lasting 24 hours with imbalance associated with it and better nystagmus on my exam with an NIH of 0. Due to timeline he is not candidate for thrombolytics but stroke alert protocols were still underwent with a nonacute head CT and CTA showing stenotic disease but no LVO or bleed. He will be treated symptomatically and also given aspirin. His minimal troponin elevation could also be mildly elevated in the setting of stroke given his symptoms and not really consistent with ACS at all. EKG is sinus bradycardia and has a stable first deg AVB. Is this patient to be included in the SEP-1 Core Measure? No   Exclusion criteria - the patient is NOT to be included for SEP-1 Core Measure due to:   Infection is not suspected      During the patient's ED course, the patient was given:  Medications   ondansetron ([place] Amass) injection 4 mg (4 mg IntraVENous Given 9/15/22 1554)   meclizine (ANTIVERT) tablet 25 mg (25 mg Oral Given 9/15/22 1524)   iopamidol (ISOVUE-370) 76 % injection 75 mL (75 mLs IntraVENous Given 9/15/22 1443)   diazePAM (VALIUM) tablet 5 mg (5 mg Oral Given 9/15/22 1554)   aspirin chewable tablet 324 mg (324 mg Oral Given 9/15/22 1554)        CLINICAL IMPRESSION  1. Dizziness    2. Elevated troponin    3. Stenosis of both internal carotid arteries        DISPOSITION  Asia Bentley was admitted in fair condition. The plan is to admit to the hospital at this time under the PCP's admitting service. Dr. Adams Villegas accepted the patient and will take over the patient's care. The total critical care time I independently spent while evaluating and treating this patient was 35 minutes. This excludes time spent doing separately billable procedures. This includes time at the bedside, data interpretation, medication management, obtaining critical history from collateral sources if the patient is unable to provide it directly, and physician consultation. Specifics of interventions taken and potentially life-threatening diagnostic considerations are listed above in the medical decision making. If this was a shared visit with an DEJUAN, the time in this attestation is non-concurrent critical care time out of the total shared critical care time provided by the DEJUAN and myself. This chart was created using Dragon dictation software. Efforts were made by me to ensure accuracy, however some errors may be present due to limitations of this technology.               Emily Gardner MD  09/15/22 3880

## 2022-09-15 NOTE — ED PROVIDER NOTES
[facility]        Pt Name: Saundra Bergman  MRN: [id number]  [place] 1940  Date of evaluation: 9/15/2022  Provider: Nichelle Saul PA-C  PCP: Ayden Fernández MD  Note Started: 2:36 PM EDT        I have seen and evaluated this patient with my supervising physician Nasreen Suarez MD.    [address] [place] [address]       Chief Complaint   Patient presents with    Dizziness    Nausea     Pt reports extreme dizziness and nausea, onset 6am this morning. HISTORY OF PRESENT ILLNESS   (Location, Timing/Onset, Context/Setting, Quality, Duration, Modifying Factors, Severity, Associated Signs and Symptoms)  Note limiting factors. Chief Complaint: Dizziness    Saundra Bergman is a 81 y.o. male who presents to the emergency department stating that he went to bed around 4:30 AM.  He woke up around 6 AM to use the restroom and felt very unsteady on his feet like he was walking on a boat. He denies any pain associated with this. This makes him nauseated. He reports a tingling sensation all over his face, chest and upper extremities. Denies any weakness or focal numbness. Denies chest pain, shortness of breath or abdominal pain. Denies illicit drug use or alcohol abuse. Denies history of vertigo. When he stands from a seated position, he feels increasingly dizzy. Nursing Notes were all reviewed and agreed with or any disagreements were addressed in the HPI. REVIEW OF SYSTEMS    (2-9 systems for level 4, 10 or more for level 5)     Review of Systems   Constitutional:  Negative for chills and fever. HENT: Negative. Eyes:  Negative for visual disturbance. Respiratory:  Negative for cough, shortness of breath, wheezing and stridor. Cardiovascular:  Negative for chest pain, palpitations and leg swelling. Gastrointestinal:  Positive for nausea. Negative for abdominal pain, constipation, diarrhea and vomiting. drift  Limb Ataxia (7): Absent  Sensory (8): Normal  Best Language (9): No aphasia  Dysarthria (10): Normal  Extinction and Inattention (11): No abnormality  Total: 0Glasgow Coma Scale  Eye Opening: Spontaneous  Best Verbal Response: Oriented  Best Motor Response: Obeys commands  [place] Coma Scale Score: 15        PHYSICAL EXAM    (up to 7 for level 4, 8 or more for level 5)     ED Triage Vitals [09/15/22 1419]   BP Temp Temp Source Heart Rate Resp SpO2 Height Weight   (!) 155/68 97.6 °F (36.4 °C) Oral 54 18 94 % -- 232 lb (105.2 kg)       Physical Exam  Vitals and nursing note reviewed. Constitutional:       Appearance: Normal appearance. He is well-developed. He is obese. He is not diaphoretic. HENT:      Head: Normocephalic and atraumatic. Right Ear: Tympanic membrane, ear canal and external ear normal.      Left Ear: Tympanic membrane, ear canal and external ear normal.      Nose: Nose normal.      Mouth/Throat:      Mouth: Mucous membranes are moist.      Pharynx: Oropharynx is clear. Eyes:      General: No scleral icterus. Right eye: No discharge. Left eye: No discharge. Extraocular Movements: Extraocular movements intact. Conjunctiva/sclera: Conjunctivae normal.      Pupils: Pupils are equal, round, and reactive to light. Comments: Pinpoint pupils   Neck:      Trachea: Trachea and phonation normal.      Meningeal: Brudzinski's sign and Kernig's sign absent. Cardiovascular:      Rate and Rhythm: Normal rate. Pulmonary:      Effort: Pulmonary effort is normal.      Breath sounds: Normal breath sounds. Abdominal:      General: Bowel sounds are normal.      Palpations: Abdomen is soft. Tenderness: no abdominal tenderness   Musculoskeletal:         General: Normal range of motion. Cervical back: Full passive range of motion without pain, normal range of motion and neck supple. Lymphadenopathy:      Cervical: No cervical adenopathy.    Skin:     General: Skin is warm and dry. Capillary Refill: Capillary refill takes less than 2 seconds. Coloration: Skin is not jaundiced or pale. Findings: No bruising, erythema, lesion or rash. Neurological:      General: No focal deficit present. Mental Status: He is alert and oriented to person, place, and time. Cranial Nerves: No cranial nerve deficit (II-XII intact). Sensory: No sensory deficit. Motor: No weakness. Comments: No pronator drift, facial droop or slurred speech. Normal finger to nose coordination. Normal rapid alternating hand movement. Normal heel to shin coordination  Modified Matilda Hallpike makes dizziness worse without nystagmus. Normal test of skew  5 out of 5 strength in all 4 extremities without focal weakness, paresthesia or radiculopathy. Subjective tingling sensation on the face, chest and bilateral upper extremities. Psychiatric:         Behavior: Behavior normal.       DIAGNOSTIC RESULTS   LABS:    Labs Reviewed   COMPREHENSIVE METABOLIC PANEL - Abnormal; Notable for the following components:       Result Value    Glucose 152 (*)     Total Bilirubin 1.1 (*)     AST 40 (*)     All other components within normal limits   TROPONIN - Abnormal; Notable for the following components:    Troponin 0.02 (*)     All other components within normal limits   POCT GLUCOSE - Abnormal; Notable for the following components:    POC Glucose 134 (*)     All other components within normal limits   CBC WITH AUTO DIFFERENTIAL   APTT   PROTIME-INR   ETHANOL   BRAIN NATRIURETIC PEPTIDE   URINALYSIS WITH REFLEX TO CULTURE   URINE DRUG SCREEN       When ordered only abnormal lab results are displayed. All other labs were within normal range or not returned as of this dictation. EKG: When ordered, EKG's are interpreted by the Emergency Department Physician in the absence of a cardiologist.  Please see their note for interpretation of EKG.     RADIOLOGY:   Non-plain film images such as CT, Ultrasound and MRI are read by the radiologist. Plain radiographic images are visualized and preliminarily interpreted by the ED Provider with the below findings:        Interpretation per the Radiologist below, if available at the time of this note:    XR CHEST PORTABLE   Final Result   No radiographic evidence of acute pulmonary disease. CTA HEAD NECK W CONTRAST   Final Result   75% stenosis of the cavernous/supraclinoid segment of the left internal   carotid artery with heavy calcified plaque. 40% stenosis in the proximal left internal carotid artery. 30% stenosis at the origin of right internal carotid artery. 40% stenosis at the origins of the bilateral vertebral arteries. RECOMMENDATIONS:   Unavailable         CT HEAD WO CONTRAST   Final Result   No acute intracranial abnormality. PROCEDURES   Unless otherwise noted below, none     Procedures    CRITICAL CARE TIME   There was a high probability of life-threatening clinical deterioration in the patient's condition requiring my urgent intervention. I personally saw the patient and independently provided 55 minutes of non-concurrent critical care out of the total shared critical care time provided, excluding separately reportable procedures. CONSULTS:    IP CONSULT TO STROKE TEAM  IP CONSULT TO INTERNAL MEDICINE  I spoke with Dr Qing Banks  stroke team at [phone]. Does not recommend TPA.  Not candidate for other emergent neurology intervention at this time     Dr Brigid Hines will admit    EMERGENCY DEPARTMENT COURSE and DIFFERENTIAL DIAGNOSIS/MDM:   Vitals:    Vitals:    09/15/22 1419   BP: (!) 155/68   Pulse: 54   Resp: 18   Temp: 97.6 °F (36.4 °C)   TempSrc: Oral   SpO2: 94%   Weight: 232 lb (105.2 kg)       Patient was given the following medications:  Medications   ondansetron (ZOFRAN) injection 4 mg (4 mg IntraVENous Given 9/15/22 1554)   meclizine (ANTIVERT) tablet 25 mg (25 mg Oral Given 9/15/22 1524)   iopamidol (ISOVUE-370) 76 % injection 75 mL (75 mLs IntraVENous Given 9/15/22 1443)   diazePAM (VALIUM) tablet 5 mg (5 mg Oral Given 9/15/22 1554)   aspirin chewable tablet 324 mg (324 mg Oral Given 9/15/22 1554)         Is this patient to be included in the SEP-1 Core Measure due to severe sepsis or septic shock? No   Exclusion criteria - the patient is NOT to be included for SEP-1 Core Measure due to: Infection is not suspected    This patient presents complaining of dizziness. It seems to be very positional however CTA does show bilateral internal carotid stenosis and does have a mild elevation in his troponin. We cannot completely exclude posterior stroke. After consultation with  stroke team, they advised for admission at our facility. They do not advise for tPA and he is not a candidate for further intervention at this time. He has no chest pain or shortness of breath. He has chronic ekg findings. Patient understands and agrees with plan for admission. Dr. Nidhi Roa will admit. FINAL IMPRESSION      1. Dizziness    2. Elevated troponin    3. Stenosis of both internal carotid arteries          DISPOSITION/PLAN   DISPOSITION Decision To Admit 09/15/2022 04:16:40 PM      PATIENT REFERRED TO:  No follow-up provider specified.     DISCHARGE MEDICATIONS:  New Prescriptions    No medications on file       DISCONTINUED MEDICATIONS:  Discontinued Medications    No medications on file              (Please note that portions of this note were completed with a voice recognition program.  Efforts were made to edit the dictations but occasionally words are mis-transcribed.)    Alphonso Person PA-C (electronically signed)           Alphonso Person PA-C  09/15/22 5839

## 2022-09-16 PROBLEM — K21.9 GERD (GASTROESOPHAGEAL REFLUX DISEASE): Status: RESOLVED | Noted: 2018-05-20 | Resolved: 2022-09-16

## 2022-09-16 PROBLEM — I65.23 STENOSIS OF BOTH INTERNAL CAROTID ARTERIES: Status: ACTIVE | Noted: 2022-09-16

## 2022-09-16 PROBLEM — K85.10 ACUTE GALLSTONE PANCREATITIS: Status: RESOLVED | Noted: 2018-05-20 | Resolved: 2022-09-16

## 2022-09-16 PROBLEM — K81.0 ACUTE CHOLECYSTITIS: Status: RESOLVED | Noted: 2018-05-20 | Resolved: 2022-09-16

## 2022-09-16 PROBLEM — I65.22 MORE THAN 50 PERCENT STENOSIS OF LEFT INTERNAL CAROTID ARTERY: Status: ACTIVE | Noted: 2022-09-16

## 2022-09-16 PROBLEM — R00.1 BRADYCARDIA: Status: ACTIVE | Noted: 2022-09-16

## 2022-09-16 PROBLEM — I63.9 CEREBROVASCULAR ACCIDENT (CVA) (HCC): Status: ACTIVE | Noted: 2022-09-16

## 2022-09-16 LAB
EKG ATRIAL RATE: 53 BPM
EKG DIAGNOSIS: NORMAL
EKG P-R INTERVAL: 326 MS
EKG Q-T INTERVAL: 432 MS
EKG QRS DURATION: 90 MS
EKG QTC CALCULATION (BAZETT): 405 MS
EKG R AXIS: 9 DEGREES
EKG T AXIS: 66 DEGREES
EKG VENTRICULAR RATE: 53 BPM
GLUCOSE BLD-MCNC: 132 MG/DL (ref 70–99)
GLUCOSE BLD-MCNC: 143 MG/DL (ref 70–99)
GLUCOSE BLD-MCNC: 154 MG/DL (ref 70–99)
GLUCOSE BLD-MCNC: 213 MG/DL (ref 70–99)
GLUCOSE BLD-MCNC: 79 MG/DL (ref 70–99)
LV EF: 60 %
LVEF MODALITY: NORMAL
PERFORMED ON: ABNORMAL
PERFORMED ON: NORMAL

## 2022-09-16 PROCEDURE — 6370000000 HC RX 637 (ALT 250 FOR IP): Performed by: INTERNAL MEDICINE

## 2022-09-16 PROCEDURE — 93306 TTE W/DOPPLER COMPLETE: CPT

## 2022-09-16 PROCEDURE — G0378 HOSPITAL OBSERVATION PER HR: HCPCS

## 2022-09-16 PROCEDURE — 93010 ELECTROCARDIOGRAM REPORT: CPT | Performed by: INTERNAL MEDICINE

## 2022-09-16 PROCEDURE — 92526 ORAL FUNCTION THERAPY: CPT

## 2022-09-16 PROCEDURE — APPNB30 APP NON BILLABLE TIME 0-30 MINS: Performed by: NURSE PRACTITIONER

## 2022-09-16 PROCEDURE — 97116 GAIT TRAINING THERAPY: CPT

## 2022-09-16 PROCEDURE — 92610 EVALUATE SWALLOWING FUNCTION: CPT

## 2022-09-16 PROCEDURE — 6360000002 HC RX W HCPCS: Performed by: INTERNAL MEDICINE

## 2022-09-16 PROCEDURE — 99220 PR INITIAL OBSERVATION CARE/DAY 70 MINUTES: CPT | Performed by: PSYCHIATRY & NEUROLOGY

## 2022-09-16 PROCEDURE — 92523 SPEECH SOUND LANG COMPREHEN: CPT

## 2022-09-16 PROCEDURE — 97535 SELF CARE MNGMENT TRAINING: CPT

## 2022-09-16 PROCEDURE — 97162 PT EVAL MOD COMPLEX 30 MIN: CPT

## 2022-09-16 PROCEDURE — APPSS60 APP SPLIT SHARED TIME 46-60 MINUTES: Performed by: NURSE PRACTITIONER

## 2022-09-16 PROCEDURE — 96372 THER/PROPH/DIAG INJ SC/IM: CPT

## 2022-09-16 PROCEDURE — 97166 OT EVAL MOD COMPLEX 45 MIN: CPT

## 2022-09-16 RX ORDER — IBUPROFEN 200 MG
200 TABLET ORAL EVERY 6 HOURS PRN
COMMUNITY

## 2022-09-16 RX ORDER — PANTOPRAZOLE SODIUM 40 MG/1
40 TABLET, DELAYED RELEASE ORAL DAILY
COMMUNITY

## 2022-09-16 RX ORDER — ERGOCALCIFEROL 1.25 MG/1
50000 CAPSULE ORAL
Status: DISCONTINUED | OUTPATIENT
Start: 2022-10-14 | End: 2022-09-17 | Stop reason: HOSPADM

## 2022-09-16 RX ORDER — GABAPENTIN 100 MG/1
100 CAPSULE ORAL NIGHTLY
COMMUNITY

## 2022-09-16 RX ORDER — INSULIN LISPRO 100 [IU]/ML
0-8 INJECTION, SOLUTION INTRAVENOUS; SUBCUTANEOUS
Status: DISCONTINUED | OUTPATIENT
Start: 2022-09-16 | End: 2022-09-17 | Stop reason: HOSPADM

## 2022-09-16 RX ORDER — INSULIN LISPRO 100 [IU]/ML
0-4 INJECTION, SOLUTION INTRAVENOUS; SUBCUTANEOUS NIGHTLY
Status: DISCONTINUED | OUTPATIENT
Start: 2022-09-16 | End: 2022-09-17 | Stop reason: HOSPADM

## 2022-09-16 RX ORDER — POTASSIUM CHLORIDE 750 MG/1
10 CAPSULE, EXTENDED RELEASE ORAL 2 TIMES DAILY
COMMUNITY

## 2022-09-16 RX ADMIN — MONTELUKAST SODIUM 10 MG: 10 TABLET, FILM COATED ORAL at 09:45

## 2022-09-16 RX ADMIN — ATORVASTATIN CALCIUM 40 MG: 40 TABLET, FILM COATED ORAL at 09:45

## 2022-09-16 RX ADMIN — LEVOTHYROXINE SODIUM 100 MCG: 0.1 TABLET ORAL at 09:50

## 2022-09-16 RX ADMIN — METOLAZONE 10 MG: 2.5 TABLET ORAL at 09:45

## 2022-09-16 RX ADMIN — SPIRONOLACTONE 50 MG: 25 TABLET ORAL at 09:45

## 2022-09-16 RX ADMIN — INSULIN LISPRO 2 UNITS: 100 INJECTION, SOLUTION INTRAVENOUS; SUBCUTANEOUS at 13:00

## 2022-09-16 RX ADMIN — SENNOSIDES 17.2 MG: 8.6 TABLET, COATED ORAL at 09:45

## 2022-09-16 RX ADMIN — DOCUSATE SODIUM 300 MG: 100 CAPSULE, LIQUID FILLED ORAL at 09:45

## 2022-09-16 RX ADMIN — ASPIRIN 81 MG 81 MG: 81 TABLET ORAL at 09:45

## 2022-09-16 RX ADMIN — TAMSULOSIN HYDROCHLORIDE 0.4 MG: 0.4 CAPSULE ORAL at 09:45

## 2022-09-16 RX ADMIN — PANTOPRAZOLE SODIUM 40 MG: 40 TABLET, DELAYED RELEASE ORAL at 10:00

## 2022-09-16 RX ADMIN — ENOXAPARIN SODIUM 30 MG: 100 INJECTION SUBCUTANEOUS at 09:45

## 2022-09-16 ASSESSMENT — PAIN DESCRIPTION - LOCATION: LOCATION: BACK

## 2022-09-16 ASSESSMENT — PAIN SCALES - GENERAL: PAINLEVEL_OUTOF10: 5

## 2022-09-16 ASSESSMENT — PAIN DESCRIPTION - PAIN TYPE: TYPE: CHRONIC PAIN

## 2022-09-16 NOTE — PROGRESS NOTES
Patient Active Problem List   Diagnosis    HTN (hypertension)    Dyslipidemia    Diabetes mellitus (HCC)    Knee osteoarthritis    Benign prostatic hyperplasia with urinary frequency    Dysequilibrium    More than 50 percent stenosis of left internal carotid artery    Bradycardia   H&P dictated

## 2022-09-16 NOTE — CONSULTS
SURGERY      RETINAL LASER         Allergies   Allergen Reactions    Pcn [Penicillins] Anaphylaxis and Swelling    Quinine Other (See Comments) and Swelling     Body sores. Body sores. 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    Drug use: No    Sexual activity: Not on file   Other Topics Concern    Not on file   Social History Narrative     , 3 kids ,   at [employer]  Now retired      Non smoker non drinker      Social Determinants of Health     Financial Resource Strain: Not on file   Food Insecurity: Not on file   Transportation Needs: Not on file   Physical Activity: Not on file   Stress: Not on file   Social Connections: Not on file   Intimate Partner Violence: Not on file   Housing Stability: Not on file       Family History   Problem Relation Age of Onset    Heart Disease Maternal Grandmother     Heart Disease Maternal Grandfather     Heart Disease Paternal Grandmother     Heart Disease Paternal Grandfather      - No history of bleeding or clotting disorders    Vital Signs  Vitals:    22 0424 22 0735 22 0822 22 0905   BP: 106/65 133/69  (!) 132/57   Pulse: 50 52  54   Resp:    Temp: 98.2 °F (36.8 °C) 97.7 °F (36.5 °C)  98.1 °F (36.7 °C)   TempSrc: Oral Oral  Oral   SpO2: 96% 93%  95%   Weight:   223 lb 1.6 oz (101.2 kg)    Height:           Physical Examination  General: no apparent distress, appears heavy calcified plaque. 40% stenosis in the proximal left internal carotid artery. 30% stenosis at the origin of right internal carotid artery. 40% stenosis at the origins of the bilateral vertebral arteries. CT head w/o 9/15/22:  Impression   No acute intracranial abnormality. 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 The area of 75% stenosis is in the cavernous/supraclinoid segment of left ICA therefore would recommend neurosurgery consult. Continue ASA and statin therapy. MRI brain pending. Recommend repeat carotid duplex in 1 year for continued surveillance. Will d/w Dr. Kayden Washington and further recommendations to follow. Thank you for the consultation. Patient educated on plan of care and disease process. All questions answered. Electronically signed by SUNSHINE Dunn CNP on 9/16/2022 at 11:21 AM     In conjunction with the advance practice clinician I independently performed a history and physical examination of Bong Gomez. Based on my evaluation the following findings were present:    Per my exam patient has no neuro deficits. He is hard of hearing. He quit smoking in the 80s. He does not consume alcohol. He states that he takes two aspirin everyday in addition to statin therapy. He apparently had an episode of dizziness and could not get his balance. His dizziness appears to have resolved. He underwent stroke workup which included CTA which suggests very distal intracranial left ICA stenosis with no hemodynamically significant stenosis in the remainder of his cervical vessels.  Location is too distal from a surgical perspective for a Vascular surgeon to treat; however unlikely that this stenosis is symptomatic at this time. Formally recommend neurosurgical discussion should this lesion become symptomatic in the future. For now continue with maximal medical therapy. Nothing further from Vascular surgery, will sign off. Please call with questions or concerns. Greater than 30 minutes of my time of which 50% of that time was spent counseling the patient/family about the condition's pathology and proposed/planned treatment as well as reviewing radiologic imaging and other relevant patient data. In addition I performed greater than 50% of the above history and/or physical examination.     Arlene Barcenas DO, FSVS, [facility] Endovascular [facility]

## 2022-09-16 NOTE — PROGRESS NOTES
[facility] [address]   Phone: [phone]    Physical Therapy    [x] Initial Evaluation            [] Daily Treatment Note         [] Discharge Summary      Patient: Asia Bentley   : 1940   MRN: [id number]   Date of Service:  2022  Admitting Diagnosis: Dysequilibrium  Current Admission Summary: per ED Provider Note 9/15 from [facility], \"patient presents complaining of dizziness. It seems to be very positional however CTA does show bilateral internal carotid stenosis and does have a mild elevation in his troponin. We cannot completely exclude posterior stroke. After consultation with  stroke team, they advised for admission at our facility. They do not advise for tPA and he is not a candidate for further intervention at this time. He has no chest pain or shortness of breath. He has chronic ekg findings. Patient understands and agrees with plan for admission. Dr. Adams Villegas will admit. \"  Past Medical History:  has a past medical history of Arthritis, Cancer ([address] Chronic kidney disease, Diabetes mellitus ([address] GERD (gastroesophageal reflux disease), Glaucoma, Hyperlipidemia, Hypertension, Irregular heart beat, Psychiatric problem, Reflux, Thyroid disease, Ulcerative colitis ([address] and Vitamin D deficiency. Past Surgical History:  has a past surgical history that includes Prostate surgery; Cataract removal; Knee arthroscopy (1973); eye surgery; Colonoscopy; joint replacement (2013); Hemorrhoid surgery; retinal laser; knee surgery (Left, [id number]); knee surgery (Left, ); and Cholecystectomy, laparoscopic (2018). Discharge Recommendations: Asia Bentley scored a 19/24 on the AM-PAC short mobility form. Current research shows that an AM-PAC score of 18 or greater is typically associated with a discharge to the patient's home setting.  Based on the patient's AM-PAC score and their current functional mobility deficits, it is recommended that the patient have 2-3 sessions per week of Physical Therapy at d/c to increase the patient's independence. At this time, this patient demonstrates the endurance to discharge home, however if pt's dizziness is still present at discharge, recommend follow-up with OP PT for vestibular evaluation. Also recommend DME including shower chair, grab bars in bathroom, and RW if dizziness is present at discharge. If all symptoms are resolved by medical team at discharge, pt with no need for further therapy or DME at discharge. Please see assessment section for further patient specific details. If patient discharges prior to next session this note will serve as a discharge summary. Please see below for the latest assessment towards goals. DME Required For Discharge: DME to be determined pending patient progress    Precautions/Restrictions: high fall risk, modified diet, . Comment: 3 carb  Weight Bearing Restrictions: no restrictions  [] Right Upper Extremity  [] Left Upper Extremity [] Right Lower Extremity  [] Left Lower Extremity     Required Braces/Orthotics: no braces required   [] Right  [] Left  Positional Restrictions:no positional restrictions    Pre-Admission Information   Lives With: alone       Patient's son lives 2 doors down, son has aspergers and patient spends the night at his apartment and then helps him get up in the morning and helps him go to work. Son works full time in IT. Type of Home: condo  Home Layout: two level, able to live on main level  Home Access:  1 step to enter without rails   Bathroom Layout: walk in shower  Bathroom Equipment: .   Comment: no equipment, showers at son's condo   Toilet Height: standard height  Home Equipment: no prior equipment  Transfer Assistance: Independent without use of device  Ambulation Assistance:Independent without use of device  ADL Assistance: independent with all ADL's  IADL Assistance: independent with homemaking tasks  Active :        [x] Yes                 [] No  Hand Dominance: [] Left                 [x] Right  Current Employment: retired. Occupation:   Hobbies: cares for son, watches TV  Recent Falls: Patient stated he has not fallen \"completely\" but falls a lot into walls and furniture since yesterday. Stated fell against objects multiple times. Examination   Vision:   Vision Gross Assessment: WFL and Unable to formally test secondary to patient stated \"I feel drunk when I close my eyes, like the room is spinning\"    Oculomotor Examination:    Spontaneous Nystagmus:    []Yes   [x]No      Direction:    Gaze Holding Nystagmus:    []Yes     [x]No     []Direction fixed    []Direction changing  Eye Movement Range:     [x]Conjugate     []Disconjugate     []Diplopia at end range Pt did have difficulty focusing with left/right end range gaze  Vergence:     [x]WNL     []Abnormal (diplopia or disconjugate @ >10 cm)  Smooth Pursuits:    []WNL    [x]Abnormal/saccadic intrusions noted  Describe:     [x]horizontal pursuit   []vertical pursuit       Saccades:     [x]WNL (2 or less)     []Slow velocity     []Impaired accuracy (overshooting/gross undershooting)    Patient able to track in all quadrants, intact convergence. However patient with non-smooth tracking (saccades) when scanning for far left to midline (more intense) and from far right to midline (less intense). Patient did not have c/o dizziness but stated had difficulty focusing. Neuro consult already in place. Hearing:   hard of hearing, left hearing aid, right hearing aid  Observation:   General Observation:  patient did not don own socks because he believed bending over would cause another spell of dizziness  Posture:   Rounded shoulders, forward head  Sensation:   reports numbness and tingling in (B) LE, chronic diabetic neuropathy  ROM:   (B) LE AROM WFL  Strength:   B LEs grossly at least 3/5 as observed through functional mobility. Decision Making: medium complexity  Clinical Presentation: evolving      Subjective  General: Patient supine in bed, reporting dizziness and nausea. Hearing aids charging. Patient agreeable to PT/OT. Pain: 5/10. Location: chronic lower left back pain near left SI joint , 3/10 onset of left sided head pain during ambulation  Pain Interventions: pain medication in place prior to arrival, RN in room at end of session     Functional Mobility  Bed Mobility  Supine to Sit: supervision  Sit to Supine: supervision  Rolling Right: supervision  Scooting: supervision  Comments: flat bed  Transfers  Sit to stand transfer: stand by assistance  Stand to sit transfer: stand by assistance  Comments: EOB x1, toilet x1  Ambulation  Surface:level surface  Assistive Device: rolling walker  Assistance: stand by assistance  Distance: 250 ft (bed > hallway > bathroom with RW) + 15 ft (bathroom > bed without AD)  Gait Mechanics: flexed trunk, slow nestor, downward gaze, UE leaning on RW  Step length decreased without AD, forward and flexed posture remained consistent   Comments:  Patient does not utilize RW at baseline but expressed he wanted to walk with the RW this session due to fear of falling. Patient reported a head pain radiating throughout anterior face to posterior head only on left side. Patient reported he never gets headaches. Patient accurately demonstrated reading room signs walking with left and right cervical rotation. No increase in pain/dizziness. No LOB throughout ambulation. Stair Mobility  Stair mobility not completed on this date. Comments:  Wheelchair Mobility:  No w/c mobility completed on this date.   Comments:  Balance  Static Sitting Balance: fair (-): maintains balance at supervision with use of UE support  Dynamic Sitting Balance: fair (-): maintains balance at supervision with use of UE support  Static Standing Balance: fair (-): maintains balance at SBA with use of UE support  Dynamic Standing Balance: fair (-): maintains balance at SBA with use of UE support  Comments: Patient reported feeling \"unsteady\" when sitting EOB without holding onto guard rails. Patient did not display any LOB throughout session. Other Therapeutic Interventions  See OT for bathroom ADLs. Total A to don socks. Functional Outcomes  AM-PAC Inpatient Mobility Raw Score : 19              Cognition  WFL  Orientation:    alert and oriented x 4  Command Following:   [facility]    Education  Barriers To Learning: hearing  Patient Education: patient educated on goals, PT role and benefits, plan of care, general safety, functional mobility training, disease specific education, transfer training, discharge recommendations  Learning Assessment:  patient verbalizes and demonstrates understanding    Assessment  Activity Tolerance: Patient reported that headache onset during ambulation and subsided after ambulation ceased. Patient appeared agitated during and after therapy. Blood Pressure:  Flat bed: 129/69  30 degree HOB elevated: 147/72  EOB sittin/52  Standin/71  End of session supine with HOB elevated: 154/73  Impairments Requiring Therapeutic Intervention: decreased functional mobility, decreased balance, decreased posture  Prognosis: fair  Clinical Assessment: Patient is 81 y.o. male admitted to [facility] for spells of dizziness. During session patient did not have any LOB but reported dizziness at beginning of session. Blood sugar increased above normal prior to ambulation. Nurse notified. Patient moved slow and cautious during ambulation secondary to fear of falling, however declines need for RW at discharge, despite moderate use of UE support on RW for longer distances. Patient reported a headache during ambulation which he reports as a new pain. Patient would benefit from PT follow-up if symptoms do not improve to rule out vestibular origin or evaluate new baseline of functional mobility within home.   Safety Interventions: patient left in bed, bed alarm in place, call light within reach, gait belt, patient at risk for falls, and nurse notified    Plan  Frequency: 2-3x/week  Current Treatment Recommendations: balance training, functional mobility training, transfer training, gait training, stair training, manual therapy - soft tissue massage, patient/caregiver education, home exercise program, safety education, equipment evaluation/education, positioning, and vestibular rehab    Goals  Patient Goals: go home without dizziness   Short Term Goals:  Time Frame: 3 visits  Patient will complete sit-to-stand transfers at Independent  Patient will ambulate 200 ft with use of no device at Independent  Patient to demonstrate dynamic balance at Independent for 3 minutes. Therapy Session Time      Individual Group Co-treatment   Time In     1105   Time Out     1203   Minutes     58     Timed Code Treatment Minutes:  13 Minutes  Total Treatment Minutes: 62 Minutes     UVALDO Chen  Therapist was present, directed the patient's care, made skilled judgement, and was responsible for assessment and treatment of the patient.          Electronically Signed By: Negro Hyatt PT      Jonathon Martinez PT, DPT #[id number]

## 2022-09-16 NOTE — CONSULTS
In patient Neurology consult        [facility]      MD Austen Aguilar  1940    Date of Service: 9/16/2022    Referring Physician: Alphonse Block MD      Reason for the consult and CC: New onset dizziness and ataxia    HPI:   The patient is a 81y.o.  years old male with history of hypertension and other medical issues who was admitted to the hospital yesterday with new onset dizziness and nausea. Symptoms started the morning of admission. Description sudden waking up with nausea, ataxia and feeling unsteady. Duration was persistent and  Degree was severe. Other associated symptoms or tingling in his face. No headache or neck or back pain. No falling or injury or blacking out. No recent triggers, relieving or aggravating factors but can be worse with head movement. He came to the ED for evaluation. Initial work-up with CT head showed no acute stroke and CTA showed intracranial left ICA stenosis. He was admitted. Today he denies any new symptoms. Feeling unsteady. Other review of system was unremarkable.       Family History   Problem Relation Age of Onset    Heart Disease Maternal Grandmother     Heart Disease Maternal Grandfather     Heart Disease Paternal Grandmother     Heart Disease Paternal Grandfather      Past Surgical History:   Procedure Laterality Date    CATARACT REMOVAL      CHOLECYSTECTOMY, LAPAROSCOPIC  05/23/2018    with cholangiogram    COLONOSCOPY      EYE SURGERY      cataracts Glaucoma surgery    HEMORRHOID SURGERY      JOINT REPLACEMENT  03/05/2013    left knee    KNEE ARTHROSCOPY  1973 2011    KNEE SURGERY Left [id number]    bone fragments    KNEE SURGERY Left 2011    meniscus    PROSTATE SURGERY      RETINAL LASER          Past Medical History:   Diagnosis Date    Arthritis     Cancer ([address]     skin cancer    Chronic kidney disease     Diabetes mellitus ([address]     GERD (gastroesophageal reflux disease)     Glaucoma     Hyperlipidemia Hypertension     Irregular heart beat     Psychiatric problem     depression and anxiety    Reflux     Thyroid disease     Ulcerative colitis ([facility] [address]     Vitamin D deficiency      Social History     Tobacco Use    Smoking status: Former     Types: Cigarettes     Quit date: 4/3/1986     Years since quittin.4    Smokeless tobacco: Never   Substance Use Topics    Alcohol use: No     Comment: 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 Body sores. mouth throat swelling    Other Other (See Comments)     Elevated glucose,     Quinidine     Quinine Derivatives      Current Facility-Administered Medications   Medication Dose Route Frequency Provider Last Rate Last Admin    insulin lispro (HUMALOG) injection vial 0-8 Units  0-8 Units SubCUTAneous TID WC Fabiana Lieberman MD   2 Units at 22 1300    insulin lispro (HUMALOG) injection vial 0-4 Units  0-4 Units SubCUTAneous Nightly Fabiana Lieberman MD        [START ON 10/14/2022] vitamin D (ERGOCALCIFEROL) capsule 50,000 Units  50,000 Units Oral Q30 Days Fabiana Lieberman MD        ondansetron [facility]) injection 4 mg  4 mg IntraVENous Q1H PRN Loretta Patel MD   4 mg at 09/15/22 1554    aspirin chewable tablet 81 mg  81 mg Oral Daily Fabiana Lieberman MD   81 mg at 22 0945    atorvastatin (LIPITOR) tablet 40 mg  40 mg Oral Daily Fabiana Lieberman MD   40 mg at 2245    pantoprazole (PROTONIX) tablet 40 mg  40 mg Oral QAM AC Fabiana Lieberman MD   40 mg at 22 1000    docusate sodium (COLACE) capsule 300 mg  300 mg Oral Daily Fabiana Lieberman MD   300 mg at 22 0945    levothyroxine (SYNTHROID) tablet 100 mcg  100 mcg Oral Daily Fabiana Lieberman MD   100 mcg at 22 0950    metOLazone (ZAROXOLYN) tablet 10 mg  10 mg Oral Daily Fabiana Lieberman MD   10 mg at 22 0945    montelukast (SINGULAIR) rotational  nystagmus  V: Facial sensation is intact  VII: Facial strength and movements: intact and symmetric  VIII: Hearing: Intact  IX: Palate elevation is symmetric  XI: Shoulder shrug is intact  XII: Tongue movements are normal  Musculoskeletal: 5/5 in all 4 extremities. Tone: Normal tone. Reflexes: Symmetric 2+ in the arms and 2+ in the legs   Planters: flexor bilaterally. Coordination: no pronator drift, no dysmetria with FNF in upper extremities. Normal REM. Sensation: normal to all modalities in both arms and legs. Gait/Posture: Unsteady    Data:  LABS:   Lab Results   Component Value Date/Time     09/15/2022 02:30 PM    K 3.8 09/15/2022 02:30 PM    K 3.8 05/21/2018 02:34 AM     09/15/2022 02:30 PM    CO2 26 09/15/2022 02:30 PM    BUN 16 09/15/2022 02:30 PM    CREATININE 1.0 09/15/2022 02:30 PM    GFRAA >60 09/15/2022 02:30 PM    GFRAA >60 04/03/2013 10:42 AM    LABGLOM >60 09/15/2022 02:30 PM    GLUCOSE 152 09/15/2022 02:30 PM    PHOS 3.3 01/16/2015 04:35 PM    MG 2.00 05/21/2018 02:34 AM    CALCIUM 8.9 09/15/2022 02:30 PM     Lab Results   Component Value Date/Time    WBC 4.7 09/15/2022 02:30 PM    RBC 5.21 09/15/2022 02:30 PM    HGB 15.5 09/15/2022 02:30 PM    HCT 46.6 09/15/2022 02:30 PM    MCV 89.4 09/15/2022 02:30 PM    RDW 13.5 09/15/2022 02:30 PM     09/15/2022 02:30 PM     Lab Results   Component Value Date    INR 1.10 09/15/2022    PROTIME 14.1 09/15/2022       Neuroimaging was independently reviewed by myself and discussed results with the patient and/or family  Reviewed notes from different physicians  Reviewed lab and blood testing    Impression:  New onset dizziness, ataxia and nausea, severe. Rule out new ischemic stroke given persistent symptoms, cerebellar stroke or peripheral vertigo. Hypertension, not controlled  Hyperlipidemia  Diabetes, not controlled  Left intracranial ICA stenosis. Maximize medical therapy with AP therapy and statin.   Blood pressure control. Recommendation:  MRI brain  Echo  Aspirin  Statin  A1c  Lipid panel  Blood pressure control  Insulin sliding scale  Diabetic control  PT and OT  Vestibular training  Supportive measures with meclizine as needed  Telemetry  DVT and GI prophylaxis  Stroke education provided  Will follow      Thank you for referring such patient. If you have any questions regarding my consult note, please don't hesitate to call me. Chaitanya Guevara MD  [phone]    This dictation was generated by voice recognition computer software.  Although all attempts are made to edit the dictation for accuracy, there may be errors in the  transcription that are not intended

## 2022-09-16 NOTE — PROGRESS NOTES
Cholecystectomy, laparoscopic (05/23/2018). Discharge Recommendations: Alyssa Pozo scored a 19/24 on the AM-PAC ADL Inpatient form. Current research shows that an AM-PAC score of 18 or greater is typically associated with a discharge to the patient's home setting. Based on the patient's AM-PAC score, and their current ADL deficits, it is recommended that the patient have 2-3 sessions per week of Occupational Therapy at d/c to increase the patient's independence. At this time, this patient demonstrates the endurance and safety to discharge home with home OT if dizziness continues or outpatient vestibular therapy  (home vs OP services) and a follow up treatment frequency of 2-3x/wk. Please see assessment section for further patient specific details. If patient discharges prior to next session this note will serve as a discharge summary. Please see below for the latest assessment towards goals. Patient may not need any further therapy if dizziness resolves, currently unable to bend down to don/doff socks/shoes due to dizziness and not wanting to walk without a RW. If patient discharges prior to next session this note will serve as a discharge summary. Please see below for the latest assessment towards goals.       DME Required For Discharge: rolling walker, shower chair with back, reacher, sock-aid, grab bar in shower other: PATIENT REFUSING ALL EQUIPMENT    Precautions/Restrictions: high fall risk, up as tolerated  Weight Bearing Restrictions: no restrictions  [] Right Upper Extremity  [] Left Upper Extremity [] Right Lower Extremity  [] Left Lower Extremity     Required Braces/Orthotics: no braces required   [] Right  [] Left  Positional Restrictions:no positional restrictions-- monitor BP     Pre-Admission Information   Lives With: alone Patient's son lives 2 doors down, son has aspergers and patient spends the night at his apartment and then helps him get up in the morning and helps him go to work.  Son works full time in IT. Type of Home: [employer]  Home Layout: two level, able to live on main level  Home Access:  1 step to enter without rails   Bathroom Layout: walk in shower  Bathroom Equipment: . Comment: no equipment, showers at son's condo   Toilet Height: standard height  Home Equipment: no prior equipment  Transfer Assistance: Independent without use of device  Ambulation Assistance:Independent without use of device  ADL Assistance: independent with all ADL's  IADL Assistance: independent with homemaking tasks  Active :        [x] Yes  [] No  Hand Dominance: [] Left  [x] Right  Current Employment: retired. Occupation:   Hobbies: cares for son, watches TV  Recent Falls: Patient stated he has not fallen \"completely\" but falls a lot into walls and furniture since yesterday. Stated fell against objects multiple times. Examination   Vision:   Vision Gross Assessment: WFL and Unable to formally test secondary to patient stated \"I feel drunk when I close my eyes, like the room is spinning\"  Patient able to track in all quadrants, intact convergence. However patient with non-smooth tracking (saccades) when scanning for far left to midline and from far right to midline. Patient did not have c/o dizziness but stated had difficulty focusing. Neuro consult ordered. Hearing:   hard of hearing, left hearing aid, right hearing aid  Perception:   WFL  Observation:   General Observation:  Flat in bed 129/69, 94% SpO2, HR 55; 30 degrees HOB raised 147/72, 95% SpO2, /52, 95%; EOB after 5 minutes of sitting 128/70 and HR 57-- complained of dizziness when coming up to sit EOB, standing 142/71 HR 53, supine at end of session after ambulation and mobility 154/73 HR 61. Posture:   Patient c/o dizziness when closed eyes, with positional changes (supine to sit EOB) and refused to bend down to don/doff socks sitting EOB.  Patient also wanting to walk with RW  Sensation:   reports numbness and tingling in (B) LE-- neuropathy from diabetes  Proprioception:    WFL  Tone:   Normotonic  Coordination Testing:   WFL    ROM:   (B) UE AROM WFL  Strength:   (B) UE strength grossly WFL    Decision Making: medium complexity  Clinical Presentation: evolving      Subjective  General: Patient supine in bed, c/o dizziness and nausea. Pain: 5/10. Location: chronic low back pain . C/o headache when ambulating in hallway. Pain Interventions: pain medication in place prior to arrival        Activities of Daily Living  Basic Activities of Daily Living  Feeding: Independent  Grooming: Independent  Grooming Comments: washing face, declined bathing  Upper Extremity Dressing: setup assistance  Lower Extremity Dressing: maximum assistance dependent  Dressing Comments: refused to don/doff socks due to stating he would get dizzy  Toileting: supervision. General Comments: ambulated into bathroom with SBA with RW, had small BM on commode. Instrumental Activities of Daily Living  No IADL completed on this date. Functional Mobility  Bed Mobility  Supine to Sit: supervision  Sit to Supine: supervision  Scooting: supervision  Comments: monitored BP multiple times, increased c/o dizziness supine to sit EOB   Transfers  Sit to stand transfer:stand by assistance  Stand to sit transfer: stand by assistance  Toilet transfer: stand by assistance  Toilet transfer comments: SBA with RW, returned to back to bed without use of device with SBA. Comments: no loss of balance, steady gait. See PT for further details. No c/o dizziness with head turning during ambulation. C/o headache on left side of head during ambulation   Functional Mobility:  Functional Mobility: .   supervision  Functional Mobility Activity: to/from bathroom  Functional Mobility Comment: RW into bathroom, no device out of bathroom, SBA all mobility     Other Therapeutic Interventions-- ambulated x 300 feet with use of RW, no LOB noted    Functional Outcomes  AM-PAC Inpatient Daily Activity Raw Score: 19    Cognition  WFL  Orientation:    alert and oriented x 4  Command Following:   [facility]     Education  Barriers To Learning: none  Patient Education: patient educated on goals, OT role and benefits, discharge recommendations  Learning Assessment:  patient verbalizes and demonstrates understanding    Assessment  Activity Tolerance: Patient limited at this time due to dizziness with mobility. Impairments Requiring Therapeutic Intervention: decreased functional mobility, decreased ADL status, decreased balance, vestibular impairment  Prognosis: good-- MRI pending, neuro consult  Clinical Assessment: Patient limited by dizziness and nausea with mobility which is affecting ADLs and functional mobility. Patient lives alone but son lives in [facility] next door.      Safety Interventions: patient left in bed, bed alarm in place, call light within reach, and nurse notified    Plan  Frequency: 3-5 x/per week  Current Treatment Recommendations: balance training, functional mobility training, neuromuscular re-education, ADL/self-care training, equipment evaluation/education, and vestibular rehab    Goals  Patient Goals: Go home   Short Term Goals:  Time Frame: until discharge  Patient will complete lower body ADL at Independent   Patient will complete toileting at Independent   Patient will complete functional transfers at Independent   Patient will complete functional mobility at Independent   Patient will increase functional standing balance to good balance x 5 minutes for improved ADL completion    Therapy Session Time     Individual Group Co-treatment   Time In    1105   Time Out    1200   Minutes    55        Timed Code Treatment Minutes:  40   Total Treatment Minutes:  55  Charge split with PT due to patient in observation       Electronically Signed By: Rut Diallo, [address] [place] [address]

## 2022-09-16 NOTE — PROGRESS NOTES
Pt family to bring in his meds for med rec around 0800 in am. Pt stated he is ok not having anything until then as the more important medication is his continuous insulin pump he has on.

## 2022-09-16 NOTE — PROGRESS NOTES
Facility/Department: [address]  SLP Clinical Swallow Evaluation and Speech Language Cognitive Assessment     Patient: Tre Cheatham   : 1940   MRN: [id number]      Evaluation Date: 2022      Admitting Dx: Dizziness [R42]  Elevated troponin [R77.8]  Stenosis of both internal carotid arteries [I65.23]  Dysequilibrium [R42]  Pain: Did not state                                  H&P: Tre Cheatham is a 81 y.o. male who presents to the emergency department stating that he went to bed around 4:30 AM.  He woke up around 6 AM to use the restroom and felt very unsteady on his feet like he was walking on a boat. He denies any pain associated with this. This makes him nauseated. He reports a tingling sensation all over his face, chest and upper extremities. Denies any weakness or focal numbness. Denies chest pain, shortness of breath or abdominal pain. Denies illicit drug use or alcohol abuse. Denies history of vertigo. When he stands from a seated position, he feels increasingly dizzy. Imaging:  Chest X-ray:  9/15/22  Impression   No radiographic evidence of acute pulmonary disease. Head CT:  9/15/22  Impression   No acute intracranial abnormality. History/Prior Level of Function:   Living Status: Pt lives at home independently. Prior Dysphagia History: Per chart review and pt report, no prior history of dysphagia. Prior Speech History: Pt endorses decline in memory with onset of dizziness, denies speech-language deficits. Reason for referral: SLP evaluation orders received due to CVA protocol . DYSPHAGIA BEDSIDE SWALLOW EVALUATION   Dysphagia Impressions/Dysphagia Diagnosis: Oropharyngeal Dysphagia   Pt alert and upright in bed for evaluation. Pt reports dizziness but denies changes with swallow function. Oral motor exam revealed grossly functional strength, ROM and coordination with no overt asymmetry.  Various textures provided to assess swallow function. Oral phase characterized by prolonged mastication and suspected premature bolus loss to pharynx. Thin liquids via cup revealed clinical symptoms of delayed swallow initiation. No overt clinical s/s of aspiration noted across trials. Pt able to achieve good oral clearance of all consistencies. Overall, pt tolerating of current diet recommendations. Recommend continuation of current diet with follow up x1 to ensure tolerance. Recommended Diet and Intervention:  Diet Solids Recommendation:  Regular texture diet  Liquid Consistency Recommendation: Thin liquids  Recommended form of Meds: Whole with water        Dysphagia Therapeutic Intervention:  Diet Tolerance Monitoring , Patient/Family Education     Compensatory Swallowing Strategies:  Upright as possible with all PO intake     Oral Mechanism Exam:  [x]WFL []Mild   [] Moderate  []Severe  []To be assessed    SHORT TERM DYSPHAGIA GOALS  Pt will functionally tolerate recommended diet with no overt clinical s/s of aspiration     Patient Positioning: Upright in bed       SPEECH LANGUAGE COGNITIVE ASSESSMENT:     Speech Diagnosis:   Cognitive-Linguistic Deficits     Impressions: Pt alert and oriented to self, place, situation and temporal aspects. Pt very hard of hearing and has hearing aids in facility that were charging at time of evaluation. He denies speech-language deficits but endorses changes in memory recall with onset of dizziness. Based on informal evaluation, cognitive linguistic deficits are characterized by decreased short-term memory recall and attention to detail. Pt able to recall novel information however demonstrates difficulty with individual pieces of unfamiliar/complex information. Auditory comprehension and verbal expression appear grossly functional at this time. Recommend ongoing cognitive-linguistic treatment for further assessment and return to prior level of function.   \  COMPREHENSION  Auditory Comprehension: Within functional limits     EXPRESSION  Verbal Expression: Mild   Impaired Divergent Naming     Pragmatics/Social Functioning: Within functional limits       MOTOR SPEECH  Motor Speech: Within functional limits     VOICE  Voice: Within functional limits     COGNITION    Overall Orientation : Within functional limits    Oriented x4    Attention: Mild      Memory: Mild  and Moderate    Impaired Short-term Memory    Problem Solving: Within functional limits      Safety/Judgement: Within functional limits      GOALS:  Short Term Speech/Language/Cognitive Goals:   Pt will improve short term recall via graded tasks to 80%  Pt will participate in ongoing cognitive assessment with goals to be established as indicated     Plan of care: 1-2 times to ensure diet tolerance. Discharge Recommendations:  Discharge recommendations to be determined pending ongoing follow-up during acute care stay    EDUCATION:   Provided education regarding role of SLP, results of assessment, recommendations and general speech pathology plan of care. [x] Pt verbalized understanding and agreement   [] Pt requires ongoing learning   [] No evidence of comprehension     If patient discharges prior to next visit, this note will serve as discharge.      Treatment time:  Timed Code Treatment Minutes: 0 minutes  Total Treatment time: 38 minutes    Electronically signed by:    Butch Cabral M.A., [address] [place], [place] [id number]  Speech-Language Pathologist  9/16/2022 9:35 AM

## 2022-09-16 NOTE — PROGRESS NOTES
Pt glucose 79. Pt alert and oriented and was given peanut butter and анна crackers, declined needing anything to drink or any other needs at this time. WCTM.

## 2022-09-16 NOTE — PROGRESS NOTES
Occupational Therapy/ Physical Therapy  Linda Rasmussen    Patient currently off the floor for echo. Will attempt later as schedule allows. Thank you,  Julissa Christie.  [facility], [address] [place] [address]

## 2022-09-16 NOTE — H&P
[place] [address]                     [facility], [address]                              HISTORY AND PHYSICAL    PATIENT NAME: Yuliya Leon                   :        1940  MED REC NO:   [id number]                          ROOM:       2148  ACCOUNT NO:   [de-identified]                           ADMIT DATE: 09/15/2022  PROVIDER:     Georgi Mallory MD    HISTORY OF PRESENT ILLNESS:  The patient is an 81-year-old white  gentleman, who came to the emergency room with history of sudden onset  of dizziness and lack of balance with dysequilibrium and some vertigo  feeling. The symptoms are rather hard to describe but he says he was  hitting the walls while trying to walk. There was no loss of  consciousness. No convulsions. No palpitations. No chest pain. No  shortness of breath. There was some nausea but no emesis. The patient  did have extreme dizziness feeling. He also had no headache or blurring  of vision but he felt strange. He had never felt like this way before. There was no unilateral weakness or numbness in the legs nor  genitourinary complaint. No incontinence. PAST MEDICAL HISTORY:  Pertinent for uncontrolled type 2 diabetes  mellitus, osteoarthritis, chronic renal insufficiency, GERD,  hyperlipidemia, hypertension, and irregular heart rhythm, psychiatric  problem, gastroesophageal reflux disease, hypothyroidism, ulcerative  colitis, and vitamin D deficiency. PAST SURGICAL HISTORY:  Pertinent for knee surgery, joint replacement,  cholecystectomy, prostatic resection, cataract removal, knee  arthroscopy, eye surgery, colonoscopy, hemorrhoidectomy, retinal  surgery, and knee surgery. FAMILY HISTORY:  Both his parents are  because of natural  causes. Maternal grandmother had atherosclerotic heart disease. Paternal grandmother also had atherosclerotic heart disease.     MEDICATIONS:  The patient is on insulin, aspirin, Lipitor, docusate,  Humalog, Zaroxolyn, Singulair, Protonix, Aldactone, Senokot, and  ergocalciferol. ALLERGIES:  The patient is allergic to PENICILLIN, QUININE, AND  QUINIDINE. SOCIAL HISTORY:  The patient has been  multiple times. He has  two children. He quit smoking in 1986. He quit drinking in 1984. 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 There is no history of  substance abuse. He lives by himself. REVIEW OF SYSTEMS:  Negative for loss of consciousness. No convulsions. No visual blurring. No dysphagia. No angina pectoris. No shortness of  breath. No orthopnea or paroxysmal nocturnal dyspnea. No abdominal  pain or hematemesis or melena. No genitourinary complaint. He does  have chronic musculoskeletal pain. The patient also has a sense of  dysequilibrium with some vertiginous feeling and overall feeling dizzy  with some lightheadedness. PHYSICAL EXAMINATION:  GENERAL:  He is alert, awake, and oriented x3. A very pleasant  64-year-old white man, looking consistent with his stated age. VITAL SIGNS:  His temperature is 98.1. Blood pressure 132/57. Respirations 16. Heart rate is 54. O2 sat 95% on room air. HEENT:  Oral mucosa dry. SKIN:  Warm and dry. NECK:  Neck is supple. The patient does have bilateral carotid bruits,  worse on the left. No jugular venous distention. No lymphadenopathy. No thyromegaly. LUNGS:  Vesicular breath sounds. Fairly clear to auscultation. HEART:  Regular rate and rhythm. S1, S2 without any S3 or S4 gallop. ABDOMEN:  Soft and nontender. Bowel sounds present. EXTREMITIES:  Shows trace edema. NEUROLOGICAL:  The patient appears grossly intact. Babinski's is  absent. LABORATORY DATA:  Lab evaluation shows blood sugar is 132. Sodium 140. Potassium 3.8. Chloride 103. CO2 26. BUN 16, creatinine 1.0. ProBNP  level 221. Troponin is 0.02. HDL is 55. LDL is 197.   White blood cell  count 4.7, hemoglobin/hematocrit is 15.4 and _____, platelet count 912. Coagulation profile shows PT of 14.1 and INR of 1.10. Microbiology  report shows not pertinent. Urinalysis negative for UTI. CT scan of  the head without contrast, no acute intracranial abnormality. CT of the  neck shows 75% stenosis of the left internal carotid artery with a  heavily calcified plaque, 40% stenosis in the proximal left internal  carotid artery, and 40% restenosis of the region of the bilateral  vertebral artery. ASSESSMENT:  New onset vertigo, dizziness, dysequilibrium, carotid  stenosis, and type 2 diabetes mellitus. PLAN:  Plan is get the patient in for observation. I also thought the  patient Epley's maneuver by showing him a video on YouTube. We will  give meclizine. We will also get an  MRI of the brain. We will consult  neurology; although, I doubt this is more of a cerebral ischemic insult  of posterior circulation. I still believe this may be an internal  labyrinthine problem, so I do not anticipate a very long hospital stay. It is always a pleasure to take care of your patients at [facility], Dr. Marco Antonio Hernandez.         Vick Barrett MD    D: 09/16/2022 12:13:52       T: 09/16/2022 12:18:52     [email or web address]  Job#: [id number]     Doc#: [id number]    CC:  Marco Antonio Hernandez MD

## 2022-09-17 ENCOUNTER — APPOINTMENT (OUTPATIENT)
Dept: MRI IMAGING | Age: 82
End: 2022-09-17
Payer: MEDICARE

## 2022-09-17 VITALS
OXYGEN SATURATION: 95 % | HEIGHT: 71 IN | BODY MASS INDEX: 31.06 KG/M2 | WEIGHT: 221.9 LBS | DIASTOLIC BLOOD PRESSURE: 70 MMHG | SYSTOLIC BLOOD PRESSURE: 142 MMHG | HEART RATE: 60 BPM | RESPIRATION RATE: 18 BRPM | TEMPERATURE: 97.5 F

## 2022-09-17 LAB
GLUCOSE BLD-MCNC: 147 MG/DL (ref 70–99)
GLUCOSE BLD-MCNC: 182 MG/DL (ref 70–99)
GLUCOSE BLD-MCNC: 238 MG/DL (ref 70–99)
PERFORMED ON: ABNORMAL

## 2022-09-17 PROCEDURE — G0378 HOSPITAL OBSERVATION PER HR: HCPCS

## 2022-09-17 PROCEDURE — 6370000000 HC RX 637 (ALT 250 FOR IP): Performed by: INTERNAL MEDICINE

## 2022-09-17 PROCEDURE — 6360000002 HC RX W HCPCS: Performed by: INTERNAL MEDICINE

## 2022-09-17 PROCEDURE — 70551 MRI BRAIN STEM W/O DYE: CPT

## 2022-09-17 PROCEDURE — 96372 THER/PROPH/DIAG INJ SC/IM: CPT

## 2022-09-17 PROCEDURE — 99225 PR SBSQ OBSERVATION CARE/DAY 25 MINUTES: CPT | Performed by: PSYCHIATRY & NEUROLOGY

## 2022-09-17 RX ORDER — MECLIZINE HCL 12.5 MG/1
12.5 TABLET ORAL 3 TIMES DAILY PRN
Qty: 30 TABLET | Refills: 0 | Status: SHIPPED | OUTPATIENT
Start: 2022-09-17 | End: 2022-09-27

## 2022-09-17 RX ADMIN — SPIRONOLACTONE 50 MG: 25 TABLET ORAL at 09:15

## 2022-09-17 RX ADMIN — METOLAZONE 10 MG: 2.5 TABLET ORAL at 09:15

## 2022-09-17 RX ADMIN — ATORVASTATIN CALCIUM 40 MG: 40 TABLET, FILM COATED ORAL at 09:15

## 2022-09-17 RX ADMIN — ENOXAPARIN SODIUM 30 MG: 100 INJECTION SUBCUTANEOUS at 09:15

## 2022-09-17 RX ADMIN — ASPIRIN 81 MG 81 MG: 81 TABLET ORAL at 09:15

## 2022-09-17 RX ADMIN — PANTOPRAZOLE SODIUM 40 MG: 40 TABLET, DELAYED RELEASE ORAL at 06:28

## 2022-09-17 RX ADMIN — MONTELUKAST SODIUM 10 MG: 10 TABLET, FILM COATED ORAL at 09:15

## 2022-09-17 RX ADMIN — SENNOSIDES 17.2 MG: 8.6 TABLET, COATED ORAL at 09:15

## 2022-09-17 RX ADMIN — DOCUSATE SODIUM 300 MG: 100 CAPSULE, LIQUID FILLED ORAL at 09:15

## 2022-09-17 RX ADMIN — TAMSULOSIN HYDROCHLORIDE 0.4 MG: 0.4 CAPSULE ORAL at 09:15

## 2022-09-17 RX ADMIN — LEVOTHYROXINE SODIUM 100 MCG: 0.1 TABLET ORAL at 06:41

## 2022-09-17 RX ADMIN — INSULIN LISPRO 2 UNITS: 100 INJECTION, SOLUTION INTRAVENOUS; SUBCUTANEOUS at 12:50

## 2022-09-17 NOTE — PLAN OF CARE
Problem: Safety - Adult  Goal: Free from fall injury  9/17/2022 0920 by Phyllis Shukla RN  Outcome: Progressing  Flowsheets (Taken 9/17/2022 0327 by Kwaku Foster RN)  Free From Fall Injury:   Instruct family/caregiver on patient safety   Based on caregiver fall risk screen, instruct family/caregiver to ask for assistance with transferring infant if caregiver noted to have fall risk factors  Note: Patient remains absent from falls at this time. Remains alert and oriented, in bed with call light and belongings in reach. Non-slip footwear on and 2/4 siderails raised. Bed remains in lowest/locked position at all times with alarm activated. Fall precautions in place. Patient encouraged to use call light to request assistance, v/u.  Will continue to monitor. Problem: Pain  Goal: Verbalizes/displays adequate comfort level or baseline comfort level  9/17/2022 0920 by Phyllis Shukla RN  Outcome: Progressing  Flowsheets (Taken 9/17/2022 0300 by Kwaku Foster RN)  Verbalizes/displays adequate comfort level or baseline comfort level:   Encourage patient to monitor pain and request assistance   Assess pain using appropriate pain scale   Administer analgesics based on type and severity of pain and evaluate response   Implement non-pharmacological measures as appropriate and evaluate response  Note: Patient denies any pain at this time. Will continue to monitor. Problem: Cardiovascular - Adult  Goal: Maintains optimal cardiac output and hemodynamic stability  9/17/2022 0920 by Phyllis Shukla RN  Outcome: Progressing  Note: Patient BP elevated, other VSS at this time on room air. Will continue to monitor. Problem: Neurosensory - Adult  Goal: Achieves stable or improved neurological status  9/17/2022 0920 by Phyllis Shukla RN  Outcome: Progressing  Note: Patient NIHSS remains 0, however patient continues to have c/o persistent dizziness with any movement. MRI still pending. Neurology following.   Will continue to monitor.

## 2022-09-17 NOTE — PLAN OF CARE
Problem: Discharge Planning  Goal: Discharge to home or other facility with appropriate resources  Outcome: Progressing  Flowsheets (Taken 9/16/2022 2056)  Discharge to home or other facility with appropriate resources: Identify barriers to discharge with patient and caregiver     Problem: Safety - Adult  Goal: Free from fall injury  Outcome: Progressing     Problem: ABCDS Injury Assessment  Goal: Absence of physical injury  Outcome: Progressing     Problem: Pain  Goal: Verbalizes/displays adequate comfort level or baseline comfort level  Outcome: Progressing  Flowsheets (Taken 9/17/2022 0300)  Verbalizes/displays adequate comfort level or baseline comfort level:   Encourage patient to monitor pain and request assistance   Assess pain using appropriate pain scale   Administer analgesics based on type and severity of pain and evaluate response   Implement non-pharmacological measures as appropriate and evaluate response     Problem: Respiratory - Adult  Goal: Achieves optimal ventilation and oxygenation  Outcome: Progressing  Flowsheets (Taken 9/16/2022 2056)  Achieves optimal ventilation and oxygenation:   Assess for changes in respiratory status   Assess for changes in mentation and behavior   Position to facilitate oxygenation and minimize respiratory effort     Problem: Cardiovascular - Adult  Goal: Maintains optimal cardiac output and hemodynamic stability  Outcome: Progressing     Problem: Skin/Tissue Integrity - Adult  Goal: Skin integrity remains intact  Outcome: Progressing  Flowsheets (Taken 9/16/2022 2056)  Skin Integrity Remains Intact:   Monitor for areas of redness and/or skin breakdown   Assess vascular access sites hourly     Problem: Musculoskeletal - Adult  Goal: Return mobility to safest level of function  Outcome: Progressing  Flowsheets (Taken 9/16/2022 2056)  Return Mobility to Safest Level of Function:   Assess patient stability and activity tolerance for standing, transferring and ambulating with or without assistive devices   Assist with transfers and ambulation using safe patient handling equipment as needed   Obtain physical therapy/occupational therapy consults as needed   Instruct patient/family in ordered activity level     Problem: Neurosensory - Adult  Goal: Achieves stable or improved neurological status  Outcome: Progressing     Problem: Metabolic/Fluid and Electrolytes - Adult  Goal: Hemodynamic stability and optimal renal function maintained  Outcome: Progressing  Flowsheets (Taken 9/16/2022 2056)  Hemodynamic stability and optimal renal function maintained:   Monitor labs and assess for signs and symptoms of volume excess or deficit   Monitor intake, output and patient weight   Monitor response to interventions for patient's volume status, including labs, urine output, blood pressure (other measures as available)   Monitor urine specific gravity, serum osmolarity and serum sodium as indicated or ordered

## 2022-09-17 NOTE — PROGRESS NOTES
Jose R Ovalles  Neurology Follow-up  [facility] Neurology    Date of Service: 9/17/2022    Subjective:   CC: Follow up today regarding: New onset dizziness    Events noted. Chart and lab reviewed. Patient feels dizzy. No new symptoms from yesterday. No headache, dysphagia or dysarthria. Awaiting MRI. Blood pressure in the 122J systolic. ROS : A 10-12 system review obtained and updated today and is unremarkable except as mentioned  in my interval history. family history includes Heart Disease in his maternal grandfather, maternal grandmother, paternal grandfather, and paternal grandmother.     Past Medical History:   Diagnosis Date    Arthritis     Cancer ([facility] [address]     skin cancer    Chronic kidney disease     Diabetes mellitus ([facility] [address]     GERD (gastroesophageal reflux disease)     Glaucoma     Hyperlipidemia     Hypertension     Irregular heart beat     Psychiatric problem     depression and anxiety    Reflux     Thyroid disease     Ulcerative colitis ([facility] [address]     Vitamin D deficiency      Current Facility-Administered Medications   Medication Dose Route Frequency Provider Last Rate Last Admin    insulin lispro (HUMALOG) injection vial 0-8 Units  0-8 Units SubCUTAneous TID WC Alphonse Block MD   2 Units at 09/16/22 1300    insulin lispro (HUMALOG) injection vial 0-4 Units  0-4 Units SubCUTAneous Nightly Alphonse Block MD        [START ON 10/14/2022] vitamin D (ERGOCALCIFEROL) capsule 50,000 Units  50,000 Units Oral Q30 Days Alphonse Block MD        ondansetron [facility]) injection 4 mg  4 mg IntraVENous Q1H PRN Madalyn Shanks MD   4 mg at 09/15/22 1554    aspirin chewable tablet 81 mg  81 mg Oral Daily Alphonse Block MD   81 mg at 09/17/22 0915    atorvastatin (LIPITOR) tablet 40 mg  40 mg Oral Daily Alphonse Block MD   40 mg at 09/17/22 0915    pantoprazole (PROTONIX) tablet 40 mg  40 mg Oral QAM AC Alphonse Block MD   40 mg at 09/17/22 0628    docusate sodium (COLACE) capsule 300 mg  300 mg Oral Daily Lindsay Garcia MD   300 mg at 09/17/22 0915    levothyroxine (SYNTHROID) tablet 100 mcg  100 mcg Oral Daily Lindsay Garcia MD   100 mcg at 09/17/22 0641    metOLazone (ZAROXOLYN) tablet 10 mg  10 mg Oral Daily Lindsay Garcia MD   10 mg at 09/17/22 0915    montelukast (SINGULAIR) tablet 10 mg  10 mg Oral Daily Lindsay Garcia MD   10 mg at 09/17/22 0915    senna (SENOKOT) tablet 17.2 mg  2 tablet Oral Daily Lindsay Garcia MD   17.2 mg at 09/17/22 0915    tamsulosin (FLOMAX) capsule 0.4 mg  0.4 mg Oral Daily Lindsay Garcia MD   0.4 mg at 09/17/22 0915    spironolactone (ALDACTONE) tablet 50 mg  50 mg Oral Daily Lindsay Garcia MD   50 mg at 09/17/22 0915    perflutren lipid microspheres (DEFINITY) injection 1.65 mg  1.5 mL IntraVENous ONCE PRN Lindsay Garcia MD        enoxaparin Sodium (LOVENOX) injection 30 mg  30 mg SubCUTAneous Daily Lindsay Garcia MD   30 mg at 09/17/22 0915    meclizine (ANTIVERT) tablet 12.5 mg  12.5 mg Oral TID PRN Lindsay Garcia MD         Allergies   Allergen Reactions    Pcn [Penicillins] Anaphylaxis and Swelling    Quinine Other (See Comments) and Swelling     Body sores. Body sores. mouth throat swelling    Other Other (See Comments)     Elevated glucose,     Quinidine     Quinine Derivatives       reports that he quit smoking about 36 years ago. He has never used smokeless tobacco. He reports that he does not drink alcohol and does not use drugs. Objective:  Exam:   Constitutional:   Vitals:    09/17/22 0447 09/17/22 0705 09/17/22 0726 09/17/22 0855   BP: (!) 128/53 129/72  (!) 157/67   Pulse: 53 50  55   Resp: 17 18 18   Temp: 97.9 °F (36.6 °C) 97.8 °F (36.6 °C)  97.4 °F (36.3 °C)   TempSrc: Oral Oral  Oral   SpO2: 93% 95%  99%   Weight:   221 lb 14.4 oz (100.7 kg)    Height:         General appearance:  Normal development and appear in no acute distress. Mental Status:   Oriented to person, place, problem, and time.     Memory: Good immediate recall. Intact remote memory  Normal attention span and concentration. Language: intact naming, repeating and fluency   Good fund of Knowledge. Cranial Nerves:   II: Visual fields: Full. Pupils: equal, round, reactive to light  III,IV,VI: Extra Ocular Movements are intact. There is a mild right lateral nystagmus  V: Facial sensation is intact  VII: Facial strength and movements: intact and symmetric  IX: Palate elevation is symmetric  XI: Shoulder shrug is intact  XII: Tongue movements are normal  Musculoskeletal: 5/5 in all 4 extremities. Tone: Normal tone. No dysmetria or tremors        Data:  LABS:   Lab Results   Component Value Date/Time     09/15/2022 02:30 PM    K 3.8 09/15/2022 02:30 PM    K 3.8 05/21/2018 02:34 AM     09/15/2022 02:30 PM    CO2 26 09/15/2022 02:30 PM    BUN 16 09/15/2022 02:30 PM    CREATININE 1.0 09/15/2022 02:30 PM    GFRAA >60 09/15/2022 02:30 PM    GFRAA >60 04/03/2013 10:42 AM    LABGLOM >60 09/15/2022 02:30 PM    GLUCOSE 152 09/15/2022 02:30 PM    PHOS 3.3 01/16/2015 04:35 PM    MG 2.00 05/21/2018 02:34 AM    CALCIUM 8.9 09/15/2022 02:30 PM     Lab Results   Component Value Date/Time    WBC 4.7 09/15/2022 02:30 PM    RBC 5.21 09/15/2022 02:30 PM    HGB 15.5 09/15/2022 02:30 PM    HCT 46.6 09/15/2022 02:30 PM    MCV 89.4 09/15/2022 02:30 PM    RDW 13.5 09/15/2022 02:30 PM     09/15/2022 02:30 PM     Lab Results   Component Value Date    INR 1.10 09/15/2022    PROTIME 14.1 09/15/2022       Neuroimaging was independently reviewed by me and discussed results with the patient  I reviewed blood testing and other test results and discussed results with the patient      Impression:  New onset dizziness and vertigo. Awaiting MRI to exclude possibility of new stroke.   Possible peripheral vertigo  Diabetes, not controlled  Hypertension   hyperlipidemia      Recommendation    MRI brain  Insulin sliding scale  Diabetic control  PT OT  Blood sugar monitor  Aspirin and statin for stroke prevention  Telemetry  DVT and GI prophylaxis  Stroke education provided  Supportive measures  We will follow if MRI showed acute stroke otherwise please call for questions        Kyrie Crow MD   [phone]      This dictation was generated by voice recognition computer software. Although all attempts are made to edit the dictation for accuracy, there may be errors in the transcription that are not intended.

## 2022-09-17 NOTE — DISCHARGE INSTRUCTIONS
Your information:  Name: Sabrina Alexander  : 1940    Your Discharge Instructions    What to do after you leave the hospital:    Read, review and familiarize yourself with the information provided below and in a separate packet on  Dizziness, Vertigo and TIA    Diet: Carb Control    Recommended activity:  Resume as tolerated  Avoid strenuous activity until instructed by your physician to resume; balance rest with periods of light to normal activity. If you experience any of the following: Unusual or inadequately controlled pain; unusual transient shortness of breath; recurrent or persistent nausea, heartburn, palpitations or lightheadedness; increased swelling; increased fatigue; fever >100; please follow up with Mitesh Fowler MD or go to the Emergency Room.       Home Health/ Outpatient Services: Declined by patient      Information obtained by:  By signing below, I understand and acknowledge receipt of the instructions indicated above, and I understand that if any problems occur once I leave the hospital I am to contact Mitesh Fowler MD.

## 2022-09-17 NOTE — PROGRESS NOTES
Department of Internal Medicine  General Internal Medicine   Progress Note      SUBJECTIVE: dizziness improved , not vertiginous , not sure if he has implemented Epley's maneuver yet but he was educated on it via You tube     History obtained from chart review, the patient, and nursing staff   General ROS: positive for  - fatigue, malaise, and sleep disturbance  negative for - chills, fever, or weight loss  Psychological ROS: negative  Ophthalmic ROS: vision blurring   Respiratory ROS: no cough, shortness of breath, or wheezing  Cardiovascular ROS: no chest pain or dyspnea on exertion  Gastrointestinal ROS: no abdominal pain, change in bowel habits, or black or bloody stools  Genito-Urinary ROS: no dysuria, trouble voiding, or hematuria  Musculoskeletal ROS: negative  Neurological ROS: no TIA or stroke symptoms  Dermatological ROS: negative    OBJECTIVE      Medications      Current Facility-Administered Medications: insulin lispro (HUMALOG) injection vial 0-8 Units, 0-8 Units, SubCUTAneous, TID WC  insulin lispro (HUMALOG) injection vial 0-4 Units, 0-4 Units, SubCUTAneous, Nightly  [START ON 10/14/2022] vitamin D (ERGOCALCIFEROL) capsule 50,000 Units, 50,000 Units, Oral, Q30 Days  ondansetron (ZOFRAN) injection 4 mg, 4 mg, IntraVENous, Q1H PRN  aspirin chewable tablet 81 mg, 81 mg, Oral, Daily  atorvastatin (LIPITOR) tablet 40 mg, 40 mg, Oral, Daily  pantoprazole (PROTONIX) tablet 40 mg, 40 mg, Oral, QAM AC  docusate sodium (COLACE) capsule 300 mg, 300 mg, Oral, Daily  levothyroxine (SYNTHROID) tablet 100 mcg, 100 mcg, Oral, Daily  metOLazone (ZAROXOLYN) tablet 10 mg, 10 mg, Oral, Daily  montelukast (SINGULAIR) tablet 10 mg, 10 mg, Oral, Daily  senna (SENOKOT) tablet 17.2 mg, 2 tablet, Oral, Daily  tamsulosin (FLOMAX) capsule 0.4 mg, 0.4 mg, Oral, Daily  spironolactone (ALDACTONE) tablet 50 mg, 50 mg, Oral, Daily  perflutren lipid microspheres (DEFINITY) injection 1.65 mg, 1.5 mL, IntraVENous, ONCE PRN  enoxaparin Sodium (LOVENOX) injection 30 mg, 30 mg, SubCUTAneous, Daily  meclizine (ANTIVERT) tablet 12.5 mg, 12.5 mg, Oral, TID PRN    Physical      Vitals: BP (!) 157/67   Pulse 55   Temp 97.4 °F (36.3 °C) (Oral)   Resp 18   Ht 5' 11\" (1.803 m)   Wt 221 lb 14.4 oz (100.7 kg)   SpO2 99%   BMI 30.95 kg/m²   Temp: Temp: 97.4 °F (36.3 °C)  Max: Temp  Av.7 °F (36.5 °C)  Min: 97.4 °F (36.3 °C)  Max: 98 °F (36.7 °C)  Respiration range:  Resp  Av  Min: 16  Max: 18  Pulse Range:  Pulse  Av.7  Min: 50  Max: 55  Blood pressure range:  Systolic (31KID), OQS:406 , Min:111 , WKC:793   , Diastolic (50UMQ), MML:78, Min:53, Max:72    SpO2  Av.7 %  Min: 93 %  Max: 99 %  No intake or output data in the 24 hours ending 22 1247    Vent settings:  Pulse  Av.7  Min: 45  Max: 55  Resp  Av.9  Min: 9  Max: 20  SpO2  Av.4 %  Min: 93 %  Max: 100 %    CONSTITUTIONAL:  awake, alert, cooperative, no apparent distress, and appears stated age  EYES:  unremarkable   NECK:  carotid bruit left  and supple, symmetrical, trachea midline  BACK:  symmetric and no curvature  LUNGS:  No increased work of breathing, good air exchange, clear to auscultation bilaterally, no crackles or wheezing  CARDIOVASCULAR:  Normal apical impulse, regular rate and rhythm, normal S1 and S2, no S3 or S4, and no murmur noted  ABDOMEN:  soft non tender BS =   MUSCULOSKELETAL:  trace edema , arthritic changes   NEUROLOGIC:  grossly intact sensory motor system absent babinski   SKIN:  warm and moist and no bruising or bleeding    Data      Recent Results (from the past 96 hour(s))   EKG 12 Lead    Collection Time: 09/15/22  2:29 PM   Result Value Ref Range    Ventricular Rate 53 BPM    Atrial Rate 53 BPM    P-R Interval 326 ms    QRS Duration 90 ms    Q-T Interval 432 ms    QTc Calculation (Mary Annett) 405 ms    R Axis 9 degrees    T Axis 66 degrees    Diagnosis       Sinus bradycardia with 1st degree A-V blockConfirmed by ALDO NUGENT, Dev Rojo [phone]) on 9/16/2022 2:08:27 PM   CBC with Auto Differential    Collection Time: 09/15/22  2:30 PM   Result Value Ref Range    WBC 4.7 4.0 - 11.0 K/uL    RBC 5.21 4.20 - 5.90 M/uL    Hemoglobin 15.5 13.5 - 17.5 g/dL    Hematocrit 46.6 40.5 - 52.5 %    MCV 89.4 80.0 - 100.0 fL    MCH 29.8 26.0 - 34.0 pg    MCHC 33.3 31.0 - 36.0 g/dL    RDW 13.5 12.4 - 15.4 %    Platelets 286 722 - 224 K/uL    MPV 8.7 5.0 - 10.5 fL    Neutrophils % 52.8 %    Lymphocytes % 29.2 %    Monocytes % 6.8 %    Eosinophils % 8.5 %    Basophils % 2.7 %    Neutrophils Absolute 2.5 1.7 - 7.7 K/uL    Lymphocytes Absolute 1.4 1.0 - 5.1 K/uL    Monocytes Absolute 0.3 0.0 - 1.3 K/uL    Eosinophils Absolute 0.4 0.0 - 0.6 K/uL    Basophils Absolute 0.1 0.0 - 0.2 K/uL   Comprehensive Metabolic Panel    Collection Time: 09/15/22  2:30 PM   Result Value Ref Range    Sodium 140 136 - 145 mmol/L    Potassium 3.8 3.5 - 5.1 mmol/L    Chloride 103 99 - 110 mmol/L    CO2 26 21 - 32 mmol/L    Anion Gap 11 3 - 16    Glucose 152 (H) 70 - 99 mg/dL    BUN 16 7 - 20 mg/dL    Creatinine 1.0 0.8 - 1.3 mg/dL    GFR Non-African American >60 >60    GFR African American >60 >60    Calcium 8.9 8.3 - 10.6 mg/dL    Total Protein 6.7 6.4 - 8.2 g/dL    Albumin 3.9 3.4 - 5.0 g/dL    Albumin/Globulin Ratio 1.4 1.1 - 2.2    Total Bilirubin 1.1 (H) 0.0 - 1.0 mg/dL    Alkaline Phosphatase 60 40 - 129 U/L    ALT 37 10 - 40 U/L    AST 40 (H) 15 - 37 U/L   Troponin    Collection Time: 09/15/22  2:30 PM   Result Value Ref Range    Troponin 0.02 (H) <0.01 ng/mL   APTT    Collection Time: 09/15/22  2:30 PM   Result Value Ref Range    aPTT 32.6 23.0 - 34.3 sec   Protime-INR    Collection Time: 09/15/22  2:30 PM   Result Value Ref Range    Protime 14.1 11.7 - 14.5 sec    INR 1.10 0.87 - 1.14   Ethanol    Collection Time: 09/15/22  2:30 PM   Result Value Ref Range    Ethanol Lvl None Detected mg/dL   Brain Natriuretic Peptide    Collection Time: 09/15/22  2:30 PM   Result Value Ref Range Pro- 0 - 449 pg/mL   POCT Glucose    Collection Time: 09/15/22  2:31 PM   Result Value Ref Range    POC Glucose 134 (H) 70 - 99 mg/dl    Performed on ACCU-CHEK    Urinalysis with Reflex to Culture    Collection Time: 09/15/22  7:46 PM    Specimen: Urine   Result Value Ref Range    Color, UA Yellow Straw/Yellow    Clarity, UA Clear Clear    Glucose, Ur Negative Negative mg/dL    Bilirubin Urine Negative Negative    Ketones, Urine Negative Negative mg/dL    Specific Gravity, UA >=1.030 1.005 - 1.030    Blood, Urine Negative Negative    pH, UA 5.0 5.0 - 8.0    Protein, UA Negative Negative mg/dL    Urobilinogen, Urine 0.2 <2.0 E.U./dL    Nitrite, Urine Negative Negative    Leukocyte Esterase, Urine Negative Negative    Microscopic Examination Not Indicated     Urine Type NotGiven     Urine Reflex to Culture Not Indicated    Urine Drug Screen    Collection Time: 09/15/22  7:46 PM   Result Value Ref Range    Amphetamine Screen, Urine Neg Negative <1000ng/mL    Barbiturate Screen, Ur Neg Negative <200 ng/mL    Benzodiazepine Screen, Urine Neg Negative <200 ng/mL    Cannabinoid Scrn, Ur Neg Negative <50 ng/mL    Cocaine Metabolite Screen, Urine Neg Negative <300 ng/mL    Opiate Scrn, Ur Neg Negative <300 ng/mL    PCP Screen, Urine Neg Negative <25 ng/mL    Methadone Screen, Urine Neg Negative <300 ng/mL    Oxycodone Urine Neg Negative <100 ng/ml    FENTANYL SCREEN, URINE Neg Negative <5 ng/mL    pH, UA 5.0     Drug Screen Comment: see below    POCT Glucose    Collection Time: 09/15/22  8:54 PM   Result Value Ref Range    POC Glucose 98 70 - 99 mg/dl    Performed on ACCU-CHEK    POCT Glucose    Collection Time: 09/16/22  2:17 AM   Result Value Ref Range    POC Glucose 79 70 - 99 mg/dl    Performed on ACCU-CHEK    POCT Glucose    Collection Time: 09/16/22  8:21 AM   Result Value Ref Range    POC Glucose 132 (H) 70 - 99 mg/dl    Performed on ACCU-CHEK    POCT Glucose    Collection Time: 09/16/22 11:55 AM   Result Value Ref Range    POC Glucose 213 (H) 70 - 99 mg/dl    Performed on ACCU-CHEK    POCT Glucose    Collection Time: 09/16/22  4:39 PM   Result Value Ref Range    POC Glucose 143 (H) 70 - 99 mg/dl    Performed on ACCU-CHEK    POCT Glucose    Collection Time: 09/16/22  9:04 PM   Result Value Ref Range    POC Glucose 154 (H) 70 - 99 mg/dl    Performed on ACCU-CHEK    POCT Glucose    Collection Time: 09/17/22  7:28 AM   Result Value Ref Range    POC Glucose 147 (H) 70 - 99 mg/dl    Performed on ACCU-CHEK    POCT Glucose    Collection Time: 09/17/22 12:39 PM   Result Value Ref Range    POC Glucose 238 (H) 70 - 99 mg/dl    Performed on [address] [place] [address] Problems             Last Modified POA    * (Principal) Dizziness 9/16/2022 Yes    Benign prostatic hyperplasia with urinary frequency 9/16/2022 Yes    Stenosis of both internal carotid arteries 9/16/2022 Yes    Bradycardia 9/16/2022 Yes    Cerebrovascular accident (CVA) ([address] 9/16/2022 Yes    HTN (hypertension), benign 9/16/2022 Yes    Dyslipidemia 9/16/2022 Yes    Diabetes mellitus ([address] 9/16/2022 Yes    Knee osteoarthritis 9/16/2022 Yes     MRI brain    ASA and statin   Tight control of diabetes    PT OT    Vestibular exercise Epley's maneuver  Meclizine prn   Neuro consult noted

## 2022-11-02 NOTE — PROGRESS NOTES
Patient seen , discharge dictated scripts given , arrangements made , NILE completed .  Discussed with nursing staff  And   If applicable ,  Discussed with  Patient's family , all questions answered and concerns addressed  When applicable

## 2022-11-03 NOTE — DISCHARGE SUMMARY
[place] [address]                     [facility], [address]                               DISCHARGE SUMMARY    PATIENT NAME: Dori Jones                   :        1940  MED REC NO:   [id number]                          ROOM:       4823  ACCOUNT NO:   [de-identified]                           ADMIT DATE: 09/15/2022  PROVIDER:     Burt Alcocer MD                  DISCHARGE DATE:  2022    FINAL DIAGNOSES:  1.  New-onset vertigo. 2.  Dizziness. 3.  Disequilibrium. 4.  Carotid stenosis. 5.  Type 2 diabetes. DISCHARGE MEDICATIONS:  1. Antivert 12.5 mg p.o. t.i.d. p.r.n. for dizziness. 2.  Protonix 40 mg once a day. 3.  Gabapentin 100 mg at bedtime. 4.  Micro-K 10 mEq twice a day. 5.  Motrin 200 mg every 6 hours p.r.n.  6.  Rapaflo 8 mg every morning. 7.  Zaroxolyn 10 mg daily. 8.  Aspirin 81 mg once a day. 9.  Colace 300 mg daily. 10.  Senokot 8.6 mg two tablets daily. 11.  Vitamin D 50,000 units once every 30 days. 12.  Regular insulin 10-20 units three times a day. 13.  Aldactone 50 mg once a day. 14.  Singulair 10 mg daily. 15.  Levothyroxine 100 mcg once a day. HOSPITAL COURSE:  This 20-year-old white gentleman patient of Dr. Josias Mike, came to the emergency room with a sudden, acute new onset of  dizziness, lack of balance, disequilibrium, and some vertigo feeling. The patient was evaluated. Vital signs were stable. There were no  significant clinical findings. Blood pressure was 132/57, temperature  98.1, respirations 16, heart rate 54, O2 sat 95% on room air. Lab  evaluation shows blood sugar of 132, sodium 140, potassium 3.8, chloride  103, CO2 of 26, BUN 16, creatinine 1.0. ProBNP 221, troponin 0.02, HDL  55, . CT head without contrast shows no acute intracranial  abnormality.   CT of the neck shows 75% stenosis of the left internal  carotid artery with a heavily calcified plaque, 40% stenosis of the left  internal carotid artery and 40% restenosis of the region of the  bilateral vertebral artery. The patient was taught vestibular  exercises, Epley's maneuver showing him on YouTube in his room. The  patient was also given meclizine. After couple days of treatment,  general condition improved significantly. So the patient did not need  to be hospitalized anymore. Neurology doctor, Dr. Veras Members who saw the  patient. He recommended MRI of the brain, insulin sliding scale,  diabetic control, PT and OT, blood sugar monitoring, and aspirin and  statin for stroke prevention. An MRI of the brain was eventually done  on 09/17/2022 and that showed no acute intracranial abnormality,  partially empty sella turcica. General condition improved. The  patient was discharged in stable condition. The patient was able to  perform all the activities of daily living. The patient was discharged  to home with his wife in stable condition.         Massimo Babb MD    D: 11/02/2022 15:06:08       T: 11/03/2022 5:01:38     [id number]  Job#: [id number]     Doc#: [id number]    CC:

## 2023-02-02 ENCOUNTER — HOSPITAL ENCOUNTER (EMERGENCY)
Age: 83
Discharge: HOME | End: 2023-02-02
Attending: EMERGENCY MEDICINE
Payer: MEDICARE

## 2023-02-02 ENCOUNTER — APPOINTMENT (OUTPATIENT)
Dept: CT IMAGING | Age: 83
End: 2023-02-02
Payer: MEDICARE

## 2023-02-02 VITALS
HEART RATE: 59 BPM | OXYGEN SATURATION: 97 % | BODY MASS INDEX: 32.48 KG/M2 | TEMPERATURE: 98.3 F | DIASTOLIC BLOOD PRESSURE: 68 MMHG | WEIGHT: 232 LBS | RESPIRATION RATE: 17 BRPM | HEIGHT: 71 IN | SYSTOLIC BLOOD PRESSURE: 133 MMHG

## 2023-02-02 DIAGNOSIS — R42 DIZZINESS: ICD-10-CM

## 2023-02-02 DIAGNOSIS — R11.0 NAUSEA: Primary | ICD-10-CM

## 2023-02-02 LAB
A/G RATIO: 1.1 (ref 1.1–2.2)
ALBUMIN SERPL-MCNC: 3.3 G/DL (ref 3.4–5)
ALP BLD-CCNC: 77 U/L (ref 40–129)
ALT SERPL-CCNC: 20 U/L (ref 10–40)
ANION GAP SERPL CALCULATED.3IONS-SCNC: 12 MMOL/L (ref 3–16)
AST SERPL-CCNC: 18 U/L (ref 15–37)
BACTERIA: NORMAL /HPF
BASOPHILS ABSOLUTE: 0 K/UL (ref 0–0.2)
BASOPHILS RELATIVE PERCENT: 0.7 %
BILIRUB SERPL-MCNC: 1.2 MG/DL (ref 0–1)
BILIRUBIN URINE: NEGATIVE
BLOOD, URINE: NEGATIVE
BUN BLDV-MCNC: 17 MG/DL (ref 7–20)
CALCIUM SERPL-MCNC: 7.7 MG/DL (ref 8.3–10.6)
CHLORIDE BLD-SCNC: 103 MMOL/L (ref 99–110)
CLARITY: CLEAR
CO2: 23 MMOL/L (ref 21–32)
COLOR: YELLOW
CREAT SERPL-MCNC: 1 MG/DL (ref 0.8–1.3)
EKG ATRIAL RATE: 72 BPM
EKG DIAGNOSIS: NORMAL
EKG P AXIS: 68 DEGREES
EKG P-R INTERVAL: 392 MS
EKG Q-T INTERVAL: 396 MS
EKG QRS DURATION: 86 MS
EKG QTC CALCULATION (BAZETT): 433 MS
EKG R AXIS: 21 DEGREES
EKG T AXIS: 60 DEGREES
EKG VENTRICULAR RATE: 72 BPM
EOSINOPHILS ABSOLUTE: 0.4 K/UL (ref 0–0.6)
EOSINOPHILS RELATIVE PERCENT: 6.4 %
EPITHELIAL CELLS, UA: 1 /HPF (ref 0–5)
GFR SERPL CREATININE-BSD FRML MDRD: >60 ML/MIN/{1.73_M2}
GLUCOSE BLD-MCNC: 298 MG/DL (ref 70–99)
GLUCOSE URINE: 500 MG/DL
HCT VFR BLD CALC: 43.3 % (ref 40.5–52.5)
HEMOGLOBIN: 14.6 G/DL (ref 13.5–17.5)
HYALINE CASTS: 0 /LPF (ref 0–8)
KETONES, URINE: NEGATIVE MG/DL
LEUKOCYTE ESTERASE, URINE: NEGATIVE
LIPASE: 63 U/L (ref 13–60)
LYMPHOCYTES ABSOLUTE: 1.2 K/UL (ref 1–5.1)
LYMPHOCYTES RELATIVE PERCENT: 18.6 %
MAGNESIUM: 1.4 MG/DL (ref 1.8–2.4)
MCH RBC QN AUTO: 29.9 PG (ref 26–34)
MCHC RBC AUTO-ENTMCNC: 33.7 G/DL (ref 31–36)
MCV RBC AUTO: 89 FL (ref 80–100)
MICROSCOPIC EXAMINATION: YES
MONOCYTES ABSOLUTE: 0.5 K/UL (ref 0–1.3)
MONOCYTES RELATIVE PERCENT: 8.6 %
NEUTROPHILS ABSOLUTE: 4.2 K/UL (ref 1.7–7.7)
NEUTROPHILS RELATIVE PERCENT: 65.7 %
NITRITE, URINE: NEGATIVE
PDW BLD-RTO: 13.4 % (ref 12.4–15.4)
PH UA: 5.5 (ref 5–8)
PLATELET # BLD: 196 K/UL (ref 135–450)
PMV BLD AUTO: 8.2 FL (ref 5–10.5)
POTASSIUM REFLEX MAGNESIUM: 3.5 MMOL/L (ref 3.5–5.1)
PROTEIN UA: ABNORMAL MG/DL
RBC # BLD: 4.87 M/UL (ref 4.2–5.9)
RBC UA: 0 /HPF (ref 0–4)
SARS-COV-2, NAAT: NOT DETECTED
SODIUM BLD-SCNC: 138 MMOL/L (ref 136–145)
SPECIFIC GRAVITY UA: 1.02 (ref 1–1.03)
TOTAL PROTEIN: 6.2 G/DL (ref 6.4–8.2)
TROPONIN: 0.02 NG/ML
TROPONIN: 0.02 NG/ML
URINE REFLEX TO CULTURE: ABNORMAL
URINE TYPE: ABNORMAL
UROBILINOGEN, URINE: 1 E.U./DL
WBC # BLD: 6.3 K/UL (ref 4–11)
WBC UA: 2 /HPF (ref 0–5)

## 2023-02-02 PROCEDURE — 81001 URINALYSIS AUTO W/SCOPE: CPT

## 2023-02-02 PROCEDURE — 80053 COMPREHEN METABOLIC PANEL: CPT

## 2023-02-02 PROCEDURE — 83735 ASSAY OF MAGNESIUM: CPT

## 2023-02-02 PROCEDURE — 36415 COLL VENOUS BLD VENIPUNCTURE: CPT

## 2023-02-02 PROCEDURE — 93005 ELECTROCARDIOGRAM TRACING: CPT | Performed by: EMERGENCY MEDICINE

## 2023-02-02 PROCEDURE — 6360000004 HC RX CONTRAST MEDICATION: Performed by: EMERGENCY MEDICINE

## 2023-02-02 PROCEDURE — 93010 ELECTROCARDIOGRAM REPORT: CPT | Performed by: INTERNAL MEDICINE

## 2023-02-02 PROCEDURE — 74177 CT ABD & PELVIS W/CONTRAST: CPT

## 2023-02-02 PROCEDURE — 6360000002 HC RX W HCPCS: Performed by: EMERGENCY MEDICINE

## 2023-02-02 PROCEDURE — 85025 COMPLETE CBC W/AUTO DIFF WBC: CPT

## 2023-02-02 PROCEDURE — 87635 SARS-COV-2 COVID-19 AMP PRB: CPT

## 2023-02-02 PROCEDURE — 84484 ASSAY OF TROPONIN QUANT: CPT

## 2023-02-02 PROCEDURE — 83690 ASSAY OF LIPASE: CPT

## 2023-02-02 PROCEDURE — 99285 EMERGENCY DEPT VISIT HI MDM: CPT

## 2023-02-02 PROCEDURE — 96365 THER/PROPH/DIAG IV INF INIT: CPT

## 2023-02-02 PROCEDURE — 2580000003 HC RX 258: Performed by: EMERGENCY MEDICINE

## 2023-02-02 RX ORDER — 0.9 % SODIUM CHLORIDE 0.9 %
500 INTRAVENOUS SOLUTION INTRAVENOUS ONCE
Status: COMPLETED | OUTPATIENT
Start: 2023-02-02 | End: 2023-02-02

## 2023-02-02 RX ORDER — PROCHLORPERAZINE MALEATE 10 MG
10 TABLET ORAL EVERY 6 HOURS PRN
Qty: 30 TABLET | Refills: 3 | Status: SHIPPED | OUTPATIENT
Start: 2023-02-02

## 2023-02-02 RX ORDER — ONDANSETRON 2 MG/ML
4 INJECTION INTRAMUSCULAR; INTRAVENOUS EVERY 6 HOURS PRN
Status: DISCONTINUED | OUTPATIENT
Start: 2023-02-02 | End: 2023-02-02 | Stop reason: HOSPADM

## 2023-02-02 RX ORDER — MAGNESIUM SULFATE 1 G/100ML
1000 INJECTION INTRAVENOUS ONCE
Status: COMPLETED | OUTPATIENT
Start: 2023-02-02 | End: 2023-02-02

## 2023-02-02 RX ADMIN — MAGNESIUM SULFATE HEPTAHYDRATE 1000 MG: 1 INJECTION, SOLUTION INTRAVENOUS at 14:36

## 2023-02-02 RX ADMIN — SODIUM CHLORIDE 500 ML: 9 INJECTION, SOLUTION INTRAVENOUS at 12:58

## 2023-02-02 RX ADMIN — IOPAMIDOL 75 ML: 755 INJECTION, SOLUTION INTRAVENOUS at 14:21

## 2023-02-02 ASSESSMENT — PAIN - FUNCTIONAL ASSESSMENT: PAIN_FUNCTIONAL_ASSESSMENT: NONE - DENIES PAIN

## 2023-02-02 ASSESSMENT — LIFESTYLE VARIABLES
HOW OFTEN DO YOU HAVE A DRINK CONTAINING ALCOHOL: NEVER
HOW MANY STANDARD DRINKS CONTAINING ALCOHOL DO YOU HAVE ON A TYPICAL DAY: PATIENT DOES NOT DRINK

## 2023-02-02 NOTE — ED PROVIDER NOTES
[facility]        Pt Name: Arabella Contreras  MRN: [id number]  [place] 1940  Date of evaluation: 2/2/2023  Provider: Erna Pablo MD  PCP: Michelle Wilson MD  Note Started: 12:03 PM EST 2/2/23    CHIEF COMPLAINT       Chief Complaint   Patient presents with    Emesis     Pt states n/v for a few day, SOB light headed as well for couple weeks, abd pain on and off,  chest tightness as well, pt states straining with bowel movements       HISTORY OF PRESENT ILLNESS: 1 or more Elements   History From: Patient        Arabella Contreras is a 81 y.o. male who presents to the ED for evaluation of shortness of breath, lightheadedness and dizzy, patient reports a week of his symptoms. Reports he did have an episode of emesis a few days previously. States that symptoms occur when he changes position or tries to get up. Denies symptoms at rest.  Denies difficulties ambulating or vertiginous symptoms. he denies sick contacts or matting or exacerbating factors. Reports increased exertion with having bowel movements but denies hematochezia melena or light-colored stools. He does report intermittent episodes of cramping like abdominal pain. Denies a history of previous abdominal surgeries. Reports blood sugars have been within normal limits. Denies focal numbness or weakness. Denies cough or rash. Patient's medical record he had a recent admission with similar complaints. Patient had CT CTA and MRI which were unremarkable. He was prescribed meclizine. Patient reports not currently taking meclizine or any antiemetics. States he has not been taking using the recommended exercises or treatment of dizziness. Nursing Notes were all reviewed and agreed with or any disagreements were addressed in the HPI. REVIEW OF SYSTEMS :      Review of Systems    Positives and Pertinent negatives as per HPI.      SURGICAL HISTORY     Past Surgical History:   Procedure Laterality Date    CATARACT REMOVAL      CHOLECYSTECTOMY, LAPAROSCOPIC  05/23/2018    with cholangiogram    COLONOSCOPY      EYE SURGERY      cataracts Glaucoma surgery    HEMORRHOID SURGERY      JOINT REPLACEMENT  03/05/2013    left knee    KNEE ARTHROSCOPY  1973 2011    KNEE SURGERY Left [id number]    bone fragments    KNEE SURGERY Left 2011    meniscus    PROSTATE SURGERY      RETINAL LASER         CURRENTMEDICATIONS       Previous Medications    ASPIRIN 81 MG TABLET    Take 81 mg by mouth daily    ATORVASTATIN (LIPITOR) 40 MG TABLET    Take 1 tablet by mouth daily    BD INSULIN SYRINGE ULTRAFINE 31G X 15/64\" 0.3 ML MISC        DOCUSATE SODIUM (COLACE) 100 MG CAPSULE    Take 300 mg by mouth daily    GABAPENTIN (NEURONTIN) 100 MG CAPSULE    Take 100 mg by mouth at bedtime. IBUPROFEN (ADVIL;MOTRIN) 200 MG TABLET    Take 200 mg by mouth every 6 hours as needed for Pain    INSULIN DISPOSABLE PUMP (V-GO 40) KIT    40 Units by Does not apply route daily    INSULIN REGULAR HUMAN (HUMULIN R U-500) 500 UNIT/ML CONCENTRATED INJECTION VIAL    Inject into the skin 10-20 units 3 times a day with meals    LEVOTHYROXINE (SYNTHROID) 100 MCG TABLET    Take 100 mcg by mouth Daily. METOLAZONE (ZAROXOLYN) 10 MG TABLET    Take 1 tablet by mouth daily    MONTELUKAST (SINGULAIR) 10 MG TABLET    Take 10 mg by mouth daily. PANTOPRAZOLE (PROTONIX) 40 MG TABLET    Take 40 mg by mouth daily    POTASSIUM CHLORIDE (MICRO-K) 10 MEQ EXTENDED RELEASE CAPSULE    Take 10 mEq by mouth 2 times daily    SENNA (SENOKOT) 8.6 MG TABLET    Take 2 tablets by mouth daily    SILODOSIN (RAPAFLO) 8 MG CAPS    Take 1 capsule by mouth every evening    SPIRONOLACTONE (ALDACTONE) 50 MG TABLET    Take 50 mg by mouth daily.     VITAMIN D (ERGOCALCIFEROL) 50254 UNITS CAPS CAPSULE    Take 50,000 Units by mouth every 30 days       ALLERGIES     Pcn [penicillins], Quinine, Other, Quinidine, and Quinine derivatives    FAMILYHISTORY     Family History   Problem Relation Age of Onset    Heart Disease Maternal Grandmother     Heart Disease Maternal Grandfather     Heart Disease Paternal Grandmother     Heart Disease Paternal Grandfather         SOCIAL HISTORY       Social History     Tobacco Use    Smoking status: Former     Types: Cigarettes     Quit date: 4/3/1986     Years since quittin.8    Smokeless tobacco: Never   Substance Use Topics    Alcohol use: No     Comment: 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   HENT:      Head: Normocephalic and atraumatic.   Pulmonary:      Effort: Pulmonary effort is normal.      Breath sounds: No wheezing or rhonchi.   Abdominal:      Tenderness: There is no abdominal tenderness. There is no guarding.      Hernia: No hernia is present.   Neurological:      General: No focal deficit present.      Mental Status: He is alert and oriented to person, place, and time.      Cranial Nerves: No cranial nerve deficit.      Sensory: No sensory deficit.      Motor: No weakness.      Gait: Gait normal.         DIAGNOSTIC RESULTS   LABS:    Labs Reviewed - No data to display    When ordered only abnormal lab results are displayed. All other labs were within normal range or not returned as of this dictation.    EKG: EKG demonstrates sinus rhythm with a jugular rate of 72 bpm.  MI interval and QTc interval within normal limits.  Patient has normal axis.  Compared EKG from 9/15/2022 I do not  appreciate significant change. RADIOLOGY:   Non-plain film images such as CT, Ultrasound and MRI are read by the radiologist. Plain radiographic images are visualized and preliminarily interpreted by the ED Provider with the below findings:      Interpretation per the Radiologist below, if available at the time of this note:    Discussed with Radiologist:     No orders to display     No results found. No results found. PROCEDURES   Unless otherwise noted below, none     Procedures    CRITICAL CARE TIME (.cct)       PAST MEDICAL HISTORY      has a past medical history of Arthritis, Cancer ([facility] [address] Chronic kidney disease, Diabetes mellitus ([address] GERD (gastroesophageal reflux disease), Glaucoma, Hyperlipidemia, Hypertension, Irregular heart beat, Psychiatric problem, Reflux, Thyroid disease, Ulcerative colitis ([address] and Vitamin D deficiency. EMERGENCY DEPARTMENT COURSE and DIFFERENTIAL DIAGNOSIS/MDM:   Vitals:    Vitals:    02/02/23 1151   BP: (!) 166/74   Pulse: 77   Resp: 18   Temp: 98.3 °F (36.8 °C)   TempSrc: Oral   SpO2: 97%   Weight: 232 lb (105.2 kg)   Height: 5' 11\" (1.803 m)       Patient was given the following medications:  Medications   0.9 % sodium chloride bolus (has no administration in time range)   ondansetron (ZOFRAN) injection 4 mg (has no administration in time range)             Is this patient to be included in the SEP-1 Core Measure due to severe sepsis or septic shock? No   Exclusion criteria - the patient is NOT to be included for SEP-1 Core Measure due to: Infection is not suspected    CC/HPI Summary, DDx, ED Course, and Reassessment: Differential diagnosis: Ménière's disease, benign positional vertigo, stroke or TIA in the posterior circulation, bleed of the cerebellopontine angle, cardiac arrhythmia, anemia, dehydration, sepsis, Metabolic emergency, Multiple Sclerosis, brain or ENT tumor, other   81-year-old male presents the ED for evaluation of postural lightheadedness. Orthostatics negative in the emergency department. Patient without focal deficits on exam.  Had recent admission with similar presentation with negative imaging studies. Patient has no symptoms at rest.  He appears to be in no acute distress. Imaging studies unremarkable. CT abdomen obtained was unremarkable. Patient remains asymptomatic on reevaluation. Low suspicion for CVA as the patient currently has no symptoms and no focal deficits. Symptoms are reproducible with changes in position per patient. Declining treatment for nausea or dizziness in the emergency department. He is encouraged to follow with his PCP, as well as continue using exercises as previously recommended. Given ENT referral .    CONSULTS: (Who and What was discussed)  None          Chronic Conditions:   Past Medical History:   Diagnosis Date    Arthritis     Cancer ([facility] [address]     skin cancer    Chronic kidney disease     Diabetes mellitus ([facility] [address]     GERD (gastroesophageal reflux disease)     Glaucoma     Hyperlipidemia     Hypertension     Irregular heart beat     Psychiatric problem     depression and anxiety    Reflux     Thyroid disease     Ulcerative colitis ([facility] [address]     Vitamin D deficiency          Records Reviewed (source and summary): Reviewed patient's previous admission with similar complaints. Disposition Considerations (include 1 Tests not done, Admit vs D/C, Shared Decision Making, Pt Expectation of Test or Tx.):   Considered obtaining CT of the patient's head but he has no focal deficits and no symptoms at rest.     Admission to the hospital considered but patient's symptoms are improving. Vital signs and testing performed is reassuring. Based on this patient is appropriate for outpatient management. No indication for admission at this time.      Symptomatic treatment with expectant management discussed with the patient and/or family member or surrogates present and they are amenable to treatment plan and outpatient follow-up. Strict return precautions were discussed with the patient and those present. All parties involved were informed that condition may persist or worsen in which case they may then require inpatient treatment, currently there is no indication. They demonstrated understanding of when to return to the emergency department for new or worsening symptoms. I am the Primary Clinician of Record. FINAL IMPRESSION      1. Nausea    2. Dizziness          DISPOSITION/PLAN     DISPOSITION        PATIENT REFERRED TO:  No follow-up provider specified.     DISCHARGE MEDICATIONS:  New Prescriptions    No medications on file       DISCONTINUED MEDICATIONS:  Discontinued Medications    No medications on file              (Please note that portions of this note were completed with a voice recognition program.  Efforts were made to edit the dictations but occasionally words are mis-transcribed.)    Lilly Child MD (electronically signed)            Lilly Child MD  02/02/23 [id number]

## 2023-05-10 ENCOUNTER — HOSPITAL ENCOUNTER (OUTPATIENT)
Dept: GENERAL RADIOLOGY | Age: 83
Discharge: HOME | End: 2023-05-10
Payer: MEDICARE

## 2023-05-10 ENCOUNTER — HOSPITAL ENCOUNTER (OUTPATIENT)
Age: 83
Discharge: HOME | End: 2023-05-10
Payer: MEDICARE

## 2023-05-10 DIAGNOSIS — I50.20 SYSTOLIC HEART FAILURE, UNSPECIFIED HF CHRONICITY (HCC): ICD-10-CM

## 2023-05-10 LAB
25(OH)D3 SERPL-MCNC: 36.6 NG/ML
ALBUMIN SERPL-MCNC: 4.2 G/DL (ref 3.4–5)
ALBUMIN/GLOB SERPL: 1.5 {RATIO} (ref 1.1–2.2)
ALP SERPL-CCNC: 53 U/L (ref 40–129)
ALT SERPL-CCNC: 37 U/L (ref 10–40)
ANION GAP SERPL CALCULATED.3IONS-SCNC: 11 MMOL/L (ref 3–16)
AST SERPL-CCNC: 36 U/L (ref 15–37)
BILIRUB SERPL-MCNC: 1.4 MG/DL (ref 0–1)
BILIRUB UR QL STRIP.AUTO: NEGATIVE
BUN SERPL-MCNC: 14 MG/DL (ref 7–20)
CALCIUM SERPL-MCNC: 9.4 MG/DL (ref 8.3–10.6)
CHLORIDE SERPL-SCNC: 106 MMOL/L (ref 99–110)
CHOLEST SERPL-MCNC: 164 MG/DL (ref 0–199)
CLARITY UR: CLEAR
CO2 SERPL-SCNC: 27 MMOL/L (ref 21–32)
COLOR UR: YELLOW
CREAT SERPL-MCNC: 1.1 MG/DL (ref 0.8–1.3)
DEPRECATED RDW RBC AUTO: 13.5 % (ref 12.4–15.4)
EKG ATRIAL RATE: 53 BPM
EKG DIAGNOSIS: NORMAL
EKG P AXIS: 109 DEGREES
EKG P-R INTERVAL: 356 MS
EKG Q-T INTERVAL: 422 MS
EKG QRS DURATION: 88 MS
EKG QTC CALCULATION (BAZETT): 395 MS
EKG R AXIS: 8 DEGREES
EKG T AXIS: 35 DEGREES
EKG VENTRICULAR RATE: 53 BPM
GFR SERPLBLD CREATININE-BSD FMLA CKD-EPI: >60 ML/MIN/{1.73_M2}
GLUCOSE SERPL-MCNC: 117 MG/DL (ref 70–99)
GLUCOSE UR STRIP.AUTO-MCNC: NEGATIVE MG/DL
HCT VFR BLD AUTO: 46 % (ref 40.5–52.5)
HDLC SERPL-MCNC: 72 MG/DL (ref 40–60)
HGB BLD-MCNC: 15.5 G/DL (ref 13.5–17.5)
HGB UR QL STRIP.AUTO: NEGATIVE
KETONES UR STRIP.AUTO-MCNC: NEGATIVE MG/DL
LDLC SERPL CALC-MCNC: 74 MG/DL
LEUKOCYTE ESTERASE UR QL STRIP.AUTO: NEGATIVE
MCH RBC QN AUTO: 30.3 PG (ref 26–34)
MCHC RBC AUTO-ENTMCNC: 33.7 G/DL (ref 31–36)
MCV RBC AUTO: 90 FL (ref 80–100)
NITRITE UR QL STRIP.AUTO: NEGATIVE
PH UR STRIP.AUTO: 5 [PH] (ref 5–8)
PLATELET # BLD AUTO: 145 K/UL (ref 135–450)
PMV BLD AUTO: 10.1 FL (ref 5–10.5)
POTASSIUM SERPL-SCNC: 4.6 MMOL/L (ref 3.5–5.1)
PROT SERPL-MCNC: 7 G/DL (ref 6.4–8.2)
PROT UR STRIP.AUTO-MCNC: NEGATIVE MG/DL
PSA SERPL DL<=0.01 NG/ML-MCNC: 1.64 NG/ML (ref 0–4)
RBC # BLD AUTO: 5.11 M/UL (ref 4.2–5.9)
SODIUM SERPL-SCNC: 144 MMOL/L (ref 136–145)
SP GR UR STRIP.AUTO: 1.01 (ref 1–1.03)
TRIGL SERPL-MCNC: 90 MG/DL (ref 0–150)
UA DIPSTICK W REFLEX MICRO PNL UR: NORMAL
URN SPEC COLLECT METH UR: NORMAL
UROBILINOGEN UR STRIP-ACNC: 0.2 E.U./DL
VLDLC SERPL CALC-MCNC: 18 MG/DL
WBC # BLD AUTO: 6 K/UL (ref 4–11)

## 2023-05-10 PROCEDURE — 84153 ASSAY OF PSA TOTAL: CPT

## 2023-05-10 PROCEDURE — 80053 COMPREHEN METABOLIC PANEL: CPT

## 2023-05-10 PROCEDURE — 36415 COLL VENOUS BLD VENIPUNCTURE: CPT

## 2023-05-10 PROCEDURE — 81003 URINALYSIS AUTO W/O SCOPE: CPT

## 2023-05-10 PROCEDURE — 71046 X-RAY EXAM CHEST 2 VIEWS: CPT

## 2023-05-10 PROCEDURE — 93005 ELECTROCARDIOGRAM TRACING: CPT | Performed by: INTERNAL MEDICINE

## 2023-05-10 PROCEDURE — 80061 LIPID PANEL: CPT

## 2023-05-10 PROCEDURE — 83036 HEMOGLOBIN GLYCOSYLATED A1C: CPT

## 2023-05-10 PROCEDURE — 82306 VITAMIN D 25 HYDROXY: CPT

## 2023-05-10 PROCEDURE — 85027 COMPLETE CBC AUTOMATED: CPT

## 2023-05-11 LAB
EST. AVERAGE GLUCOSE BLD GHB EST-MCNC: 154.2 MG/DL
HBA1C MFR BLD: 7 %

## 2023-06-13 PROBLEM — I50.32 CHRONIC DIASTOLIC HEART FAILURE (HCC): Status: ACTIVE | Noted: 2023-06-13

## 2023-06-13 PROBLEM — I35.0 NONRHEUMATIC AORTIC VALVE STENOSIS: Status: ACTIVE | Noted: 2023-06-13

## 2023-06-28 ENCOUNTER — HOSPITAL ENCOUNTER (OUTPATIENT)
Dept: CARDIOLOGY | Age: 83
Discharge: HOME | End: 2023-06-28
Payer: MEDICARE

## 2023-06-28 DIAGNOSIS — E87.70 HYPERVOLEMIA, UNSPECIFIED HYPERVOLEMIA TYPE: ICD-10-CM

## 2023-06-28 DIAGNOSIS — I25.10 DISEASE OF CARDIOVASCULAR SYSTEM: ICD-10-CM

## 2023-06-28 DIAGNOSIS — I50.20 HEART FAILURE WITH REDUCED LEFT VENTRICULAR FUNCTION (HCC): ICD-10-CM

## 2023-06-28 LAB
LV EF: 60 %
LVEF MODALITY: NORMAL

## 2023-06-28 PROCEDURE — 93306 TTE W/DOPPLER COMPLETE: CPT

## 2023-06-29 ENCOUNTER — TELEPHONE (OUTPATIENT)
Dept: CARDIOLOGY CLINIC | Age: 83
End: 2023-06-29

## 2023-06-29 NOTE — TELEPHONE ENCOUNTER
Dr Jennifer Hastings would like to speak to MD that ordered the echocardiogram. Left message with office to return call to Dr Jennifer Hastings.

## 2023-07-31 NOTE — TELEPHONE ENCOUNTER
[phone]  left voicemail message with office to return call to Dr Candance Monica and provided phone number as well as office number.

## 2023-07-31 NOTE — TELEPHONE ENCOUNTER
Dr. Pari Monae,   Please advise if you did you speak with this provider- Dr. Douglas Burnett so that we may close this encounter. Thanks.

## 2023-08-01 ENCOUNTER — TELEPHONE (OUTPATIENT)
Dept: CARDIOLOGY CLINIC | Age: 83
End: 2023-08-01

## 2023-08-01 NOTE — TELEPHONE ENCOUNTER
Demetrio's son in law Marcianne Madison called in this morning, he states he spoke with someone yesterday concerning getting an appointment to have the aortic valve replacement done. He would like a return call. He can be reached at [phone].

## 2023-08-21 ENCOUNTER — OFFICE VISIT (OUTPATIENT)
Dept: INTERNAL MEDICINE CLINIC | Age: 83
End: 2023-08-21
Payer: MEDICARE

## 2023-08-21 VITALS
SYSTOLIC BLOOD PRESSURE: 150 MMHG | DIASTOLIC BLOOD PRESSURE: 74 MMHG | WEIGHT: 229 LBS | BODY MASS INDEX: 32.06 KG/M2 | HEIGHT: 71 IN | HEART RATE: 64 BPM | OXYGEN SATURATION: 98 %

## 2023-08-21 DIAGNOSIS — E11.9 TYPE 2 DIABETES MELLITUS WITHOUT COMPLICATION, WITHOUT LONG-TERM CURRENT USE OF INSULIN (HCC): ICD-10-CM

## 2023-08-21 DIAGNOSIS — I10 HTN (HYPERTENSION), BENIGN: Primary | ICD-10-CM

## 2023-08-21 DIAGNOSIS — E78.5 DYSLIPIDEMIA: ICD-10-CM

## 2023-08-21 DIAGNOSIS — I35.0 NONRHEUMATIC AORTIC VALVE STENOSIS: ICD-10-CM

## 2023-08-21 DIAGNOSIS — I63.89 CEREBROVASCULAR ACCIDENT (CVA) DUE TO OTHER MECHANISM (HCC): ICD-10-CM

## 2023-08-21 DIAGNOSIS — N40.1 BENIGN PROSTATIC HYPERPLASIA WITH URINARY FREQUENCY: ICD-10-CM

## 2023-08-21 DIAGNOSIS — R35.0 BENIGN PROSTATIC HYPERPLASIA WITH URINARY FREQUENCY: ICD-10-CM

## 2023-08-21 DIAGNOSIS — I65.23 STENOSIS OF BOTH INTERNAL CAROTID ARTERIES: ICD-10-CM

## 2023-08-21 PROBLEM — R00.1 BRADYCARDIA: Status: RESOLVED | Noted: 2022-09-16 | Resolved: 2023-08-21

## 2023-08-21 PROBLEM — R42 DIZZINESS: Status: RESOLVED | Noted: 2022-09-15 | Resolved: 2023-08-21

## 2023-08-21 PROCEDURE — 3077F SYST BP >= 140 MM HG: CPT | Performed by: INTERNAL MEDICINE

## 2023-08-21 PROCEDURE — G8417 CALC BMI ABV UP PARAM F/U: HCPCS | Performed by: INTERNAL MEDICINE

## 2023-08-21 PROCEDURE — G8427 DOCREV CUR MEDS BY ELIG CLIN: HCPCS | Performed by: INTERNAL MEDICINE

## 2023-08-21 PROCEDURE — 1123F ACP DISCUSS/DSCN MKR DOCD: CPT | Performed by: INTERNAL MEDICINE

## 2023-08-21 PROCEDURE — 3078F DIAST BP <80 MM HG: CPT | Performed by: INTERNAL MEDICINE

## 2023-08-21 PROCEDURE — 99204 OFFICE O/P NEW MOD 45 MIN: CPT | Performed by: INTERNAL MEDICINE

## 2023-08-21 PROCEDURE — 1036F TOBACCO NON-USER: CPT | Performed by: INTERNAL MEDICINE

## 2023-08-21 PROCEDURE — 3051F HG A1C>EQUAL 7.0%<8.0%: CPT | Performed by: INTERNAL MEDICINE

## 2023-08-21 RX ORDER — ROSUVASTATIN CALCIUM 10 MG/1
20 TABLET, COATED ORAL DAILY
Qty: 30 TABLET | Refills: 5 | Status: SHIPPED | OUTPATIENT
Start: 2023-08-21

## 2023-08-21 RX ORDER — FUROSEMIDE 40 MG/1
40 TABLET ORAL DAILY
Qty: 30 TABLET | Refills: 1 | Status: SHIPPED | OUTPATIENT
Start: 2023-08-21 | End: 2023-10-20

## 2023-08-21 RX ORDER — INSULIN LISPRO 100 [IU]/ML
INJECTION, SOLUTION INTRAVENOUS; SUBCUTANEOUS
Qty: 3 ML | Refills: 3 | Status: SHIPPED | OUTPATIENT
Start: 2023-08-21

## 2023-08-21 RX ORDER — SUB-Q INSULIN DEVICE, 40 UNIT
1 EACH MISCELLANEOUS DAILY
Qty: 30 KIT | Refills: 5 | Status: SHIPPED | OUTPATIENT
Start: 2023-08-21

## 2023-08-21 RX ORDER — GABAPENTIN 100 MG/1
100 CAPSULE ORAL NIGHTLY
Qty: 90 CAPSULE | Refills: 0 | Status: SHIPPED | OUTPATIENT
Start: 2023-08-21 | End: 2023-11-19

## 2023-08-21 RX ORDER — SILODOSIN 8 MG/1
8 CAPSULE ORAL EVERY EVENING
Qty: 90 CAPSULE | Refills: 0 | Status: SHIPPED | OUTPATIENT
Start: 2023-08-21 | End: 2023-11-19

## 2023-08-21 ASSESSMENT — ENCOUNTER SYMPTOMS
WHEEZING: 0
ABDOMINAL PAIN: 0
HOARSE VOICE: 0
SINUS PAIN: 0
COLOR CHANGE: 0
CHEST TIGHTNESS: 0
CONSTIPATION: 0
VISUAL CHANGE: 0
COUGH: 0
BLOOD IN STOOL: 0
SHORTNESS OF BREATH: 0

## 2023-08-21 ASSESSMENT — PATIENT HEALTH QUESTIONNAIRE - PHQ9
1. LITTLE INTEREST OR PLEASURE IN DOING THINGS: 0
SUM OF ALL RESPONSES TO PHQ QUESTIONS 1-9: 0

## 2023-08-21 NOTE — PROGRESS NOTES
Sergio Smith (:  1940) is a 81 y.o. male,New patient, here for evaluation of the following chief complaint(s):  New Patient ([facility] cardiac sx scheduled 23) and Heart Problem (Valve replacement scheduled )         ASSESSMENT/PLAN:  1. HTN (hypertension), benign  -     Comprehensive Metabolic Panel; Future  -     CBC with Auto Differential; Future  2. Dyslipidemia  -     rosuvastatin (CRESTOR) 10 MG tablet; Take 2 tablets by mouth daily, Disp-30 tablet, R-5Normal  -     Lipid Panel; Future  3. Type 2 diabetes mellitus without complication, without long-term current use of insulin (HCC)  -     insulin lispro (HUMALOG) 100 UNIT/ML injection cartridge; 1.67 units/h with 2 units bolus, Disp-3 mL, R-3Continuous. Normal  -     insulin lispro (HUMALOG KWIKPEN U-200) 200 UNIT/ML SOPN pen; Inject according to the sliding scale, Disp-2 Adjustable Dose Pre-filled Pen Syringe, R-3Normal  -     gabapentin (NEURONTIN) 100 MG capsule; Take 1 capsule by mouth at bedtime for 90 days. , Disp-90 capsule, R-0Normal  -     Insulin Disposable Pump (V-GO 40) 40 UNIT/24HR KIT; 1 each by Does not apply route daily, Disp-30 kit, R-5Normal  -     Comprehensive Metabolic Panel; Future  -     CBC with Auto Differential; Future  -     Hemoglobin A1C; Future  4. Stenosis of both internal carotid arteries  5. Cerebrovascular accident (CVA) due to other mechanism ([address])  6. Benign prostatic hyperplasia with urinary frequency  -     silodosin (RAPAFLO) 8 MG CAPS; Take 1 capsule by mouth every evening, Disp-90 capsule, R-0Normal  7. Nonrheumatic aortic valve stenosis  . Patient presents today to establish himself he has multiple medical issues he has underlying diabetes that is borderline controlled he is on a disposable insulin pump that he has been on for a while now we will get a continue the same for him and refill his prescription.     He has history of underactive thyroid and we will check his thyroid function as well as

## 2023-08-23 ENCOUNTER — TELEPHONE (OUTPATIENT)
Dept: INTERNAL MEDICINE CLINIC | Age: 83
End: 2023-08-23

## 2023-08-23 DIAGNOSIS — I10 HTN (HYPERTENSION), BENIGN: ICD-10-CM

## 2023-08-23 DIAGNOSIS — E11.9 TYPE 2 DIABETES MELLITUS WITHOUT COMPLICATION, WITHOUT LONG-TERM CURRENT USE OF INSULIN (HCC): ICD-10-CM

## 2023-08-23 DIAGNOSIS — E78.5 DYSLIPIDEMIA: ICD-10-CM

## 2023-08-23 LAB
ALBUMIN SERPL-MCNC: 4 G/DL (ref 3.4–5)
ALBUMIN/GLOB SERPL: 1.7 {RATIO} (ref 1.1–2.2)
ALP SERPL-CCNC: 53 U/L (ref 40–129)
ALT SERPL-CCNC: 31 U/L (ref 10–40)
ANION GAP SERPL CALCULATED.3IONS-SCNC: 12 MMOL/L (ref 3–16)
AST SERPL-CCNC: 33 U/L (ref 15–37)
BASOPHILS # BLD: 0.1 K/UL (ref 0–0.2)
BASOPHILS NFR BLD: 1.3 %
BILIRUB SERPL-MCNC: 1.3 MG/DL (ref 0–1)
BUN SERPL-MCNC: 18 MG/DL (ref 7–20)
CALCIUM SERPL-MCNC: 9.2 MG/DL (ref 8.3–10.6)
CHLORIDE SERPL-SCNC: 103 MMOL/L (ref 99–110)
CHOLEST SERPL-MCNC: 145 MG/DL (ref 0–199)
CO2 SERPL-SCNC: 28 MMOL/L (ref 21–32)
CREAT SERPL-MCNC: 1.3 MG/DL (ref 0.8–1.3)
DEPRECATED RDW RBC AUTO: 13.5 % (ref 12.4–15.4)
EOSINOPHIL # BLD: 0.3 K/UL (ref 0–0.6)
EOSINOPHIL NFR BLD: 5.3 %
GFR SERPLBLD CREATININE-BSD FMLA CKD-EPI: 55 ML/MIN/{1.73_M2}
GLUCOSE SERPL-MCNC: 59 MG/DL (ref 70–99)
HCT VFR BLD AUTO: 44.3 % (ref 40.5–52.5)
HDLC SERPL-MCNC: 62 MG/DL (ref 40–60)
HGB BLD-MCNC: 15.5 G/DL (ref 13.5–17.5)
LDLC SERPL CALC-MCNC: 61 MG/DL
LYMPHOCYTES # BLD: 2.3 K/UL (ref 1–5.1)
LYMPHOCYTES NFR BLD: 38.9 %
MCH RBC QN AUTO: 31.1 PG (ref 26–34)
MCHC RBC AUTO-ENTMCNC: 35 G/DL (ref 31–36)
MCV RBC AUTO: 88.7 FL (ref 80–100)
MONOCYTES # BLD: 0.5 K/UL (ref 0–1.3)
MONOCYTES NFR BLD: 8.3 %
NEUTROPHILS # BLD: 2.8 K/UL (ref 1.7–7.7)
NEUTROPHILS NFR BLD: 46.2 %
PLATELET # BLD AUTO: 168 K/UL (ref 135–450)
PMV BLD AUTO: 10 FL (ref 5–10.5)
POTASSIUM SERPL-SCNC: 3.5 MMOL/L (ref 3.5–5.1)
PROT SERPL-MCNC: 6.4 G/DL (ref 6.4–8.2)
RBC # BLD AUTO: 4.99 M/UL (ref 4.2–5.9)
SODIUM SERPL-SCNC: 143 MMOL/L (ref 136–145)
TRIGL SERPL-MCNC: 110 MG/DL (ref 0–150)
VLDLC SERPL CALC-MCNC: 22 MG/DL
WBC # BLD AUTO: 6 K/UL (ref 4–11)

## 2023-08-23 NOTE — TELEPHONE ENCOUNTER
Jacki,   I attempted to aid in what Cornelio Miranda RN and Dr. Cornelio Miranda were initially working on below. Please check with him to see if this has been completed.

## 2023-08-23 NOTE — TELEPHONE ENCOUNTER
Pt was here for an appt yesterday. He states that when he went to  his prescriptions, Eduardo Alan said that they need clarification from us on his insulin because it didn't make sense to them. Please call Eduardo Alan to clarify & call patient when this is completed. [phone].

## 2023-08-23 NOTE — TELEPHONE ENCOUNTER
Spoke to patient's pharmacy, faxed not yet receive; stated sent earlier this morning. Confirmed fax number with call. Sanna Montoya; pharmacist says Insulin orders need to indicate  max sliding scale for the day.      Previous order: Humalog 200 units  30 ml  Maximum sliding scale 300 units per day = 10 pens a month    Previous order: Humalog 100 units  Per max sliding scale  30ml = 10 pen a month

## 2023-08-23 NOTE — TELEPHONE ENCOUNTER
Pharmacist called  and stated sent fax on Monday 8/21/23 and will resend fax today to clarify insulin order  for PCP.

## 2023-08-24 LAB
EST. AVERAGE GLUCOSE BLD GHB EST-MCNC: 168.6 MG/DL
HBA1C MFR BLD: 7.5 %

## 2023-08-29 ENCOUNTER — OFFICE VISIT (OUTPATIENT)
Dept: ENT CLINIC | Age: 83
End: 2023-08-29
Payer: MEDICARE

## 2023-08-29 VITALS
DIASTOLIC BLOOD PRESSURE: 68 MMHG | HEART RATE: 65 BPM | TEMPERATURE: 97.5 F | BODY MASS INDEX: 30.94 KG/M2 | SYSTOLIC BLOOD PRESSURE: 132 MMHG | HEIGHT: 71 IN | OXYGEN SATURATION: 96 % | WEIGHT: 221 LBS

## 2023-08-29 DIAGNOSIS — H61.23 BILATERAL IMPACTED CERUMEN: Primary | ICD-10-CM

## 2023-08-29 DIAGNOSIS — H90.0 CONDUCTIVE HEARING LOSS, BILATERAL: ICD-10-CM

## 2023-08-29 PROCEDURE — 99999 PR OFFICE/OUTPT VISIT,PROCEDURE ONLY: CPT | Performed by: OTOLARYNGOLOGY

## 2023-08-29 PROCEDURE — 69210 REMOVE IMPACTED EAR WAX UNI: CPT | Performed by: OTOLARYNGOLOGY

## 2023-08-29 NOTE — PROGRESS NOTES
Chief Complaint   Patient presents with    Cerumen Impaction    Hearing Loss       HISTORY:    Lavinia Cedeño stated that the hearing is decreased in both ears, which are plugged up with ear wax. Hearing decreased form usual.  Also getting both hearing aids fixed. Has wax and so could not do the hearing test.        EXAMINATION:    Both external ear canals were occluded by cerumen impaction, obscuring visualization of the TMs. PROCEDURE - REMOVAL OF BILATERAL CERUMEN IMPACTION:   The cerumen impaction was removed from both of the EACs, under otomicroscopy visualization, with instrumentation, using a Billeau wire loop. After successful cerumen removal, the EACs appeared to be normal and clear bilaterally without mass, exudate, or edema. The tympanic membranes appeared to be normal, including normal pneumatic mobility bilaterally. There was no evidence of acute disease. Lavinia Cedeño reported improved hearing, back to usual level, after cerumen removal, and replacement of hearing aids. IMPRESSION / Marsa Certain / Alysa Alejandreen / PROCEDURES:       Lavinia Cedeño was seen today for cerumen impaction and hearing loss. Diagnoses and all orders for this visit:    Bilateral impacted cerumen    Conductive hearing loss, bilateral        RECOMMENDATIONS / PLAN:   See Patient Instructions on file for this visit. Return for recurrence of symptoms of excessive ear wax, or any other ear, nose, throat, or sinus problems.

## 2023-08-29 NOTE — PATIENT INSTRUCTIONS
Schedule an audiogram (hearing test), if your hearing is not back to your usual ability, or if hearing loss or tinnitus (ringing or other noise) persists, despite removal of ear wax,. Schedule an appointment for ear recheck and possible cleaning in the future if your hearing is decreased, or you have a sensation of ear wax build up or are told you have ear wax build up by your primary physician or other health care provider. You may use an over the counter ear wax removal kit (such as Murine, Bausch and Lomb, NeilMed, or Debrox wax removal system) for ear wax removal, as needed. It may help to use Debrox (OTC) for 4 days prior to future visits for ear cleaning. This may soften your ear wax and facilitate removal of the wax. NO Q-TIPS OR OTHER INSTRUMENTS/OBJECTS IN THE EARS   You should never clean your ears with a Q-tip, cotton tipped applicator, Nereyda pin, paper clip, safety pin, pen cap, or any other instrument. This will tend to push wax in deeper and pack the ear canal with wax. There is a high risk and danger of this practice, especially rupture of ear drum, dislocation or other damage to ossicles, and permanent, irreversible, and irreparable hearing loss. It may cause inflammation and irritation of the ear canal and cause itching or pain. I recommend only use of one the several ear wax removal kits available \"over the counter\" if you feel a need to try to remove ear wax. For example, Murine, Bausch and Lomb, NeilMed, or Debrox ear wax removal kits may be used for ear wax removal, as needed. No other methods should be self used for cleaning your ears.

## 2023-08-30 DIAGNOSIS — E11.9 TYPE 2 DIABETES MELLITUS WITHOUT COMPLICATION, WITHOUT LONG-TERM CURRENT USE OF INSULIN (HCC): ICD-10-CM

## 2023-08-30 RX ORDER — INSULIN LISPRO 100 [IU]/ML
INJECTION, SOLUTION INTRAVENOUS; SUBCUTANEOUS
Qty: 30 ML | Refills: 3 | Status: SHIPPED | OUTPATIENT
Start: 2023-08-30

## 2023-08-31 ENCOUNTER — TELEPHONE (OUTPATIENT)
Dept: INTERNAL MEDICINE CLINIC | Age: 83
End: 2023-08-31

## 2023-08-31 NOTE — TELEPHONE ENCOUNTER
[address] [facility] called requesting a quantity change of the medication insulin lispro humalog 200 unit/ml. Requesting to change to 6 boxes. Please advise.

## 2023-11-17 ENCOUNTER — OFFICE VISIT (OUTPATIENT)
Dept: INTERNAL MEDICINE CLINIC | Age: 83
End: 2023-11-17

## 2023-11-17 VITALS
HEART RATE: 78 BPM | BODY MASS INDEX: 30.52 KG/M2 | WEIGHT: 218 LBS | SYSTOLIC BLOOD PRESSURE: 130 MMHG | DIASTOLIC BLOOD PRESSURE: 84 MMHG | HEIGHT: 71 IN | TEMPERATURE: 97.2 F

## 2023-11-17 DIAGNOSIS — L03.119 CELLULITIS OF FOOT: ICD-10-CM

## 2023-11-17 DIAGNOSIS — L03.119 CELLULITIS OF FOOT: Primary | ICD-10-CM

## 2023-11-17 DIAGNOSIS — E11.9 TYPE 2 DIABETES MELLITUS WITHOUT COMPLICATION, WITHOUT LONG-TERM CURRENT USE OF INSULIN (HCC): ICD-10-CM

## 2023-11-17 LAB
BASOPHILS # BLD: 0.2 K/UL (ref 0–0.2)
BASOPHILS NFR BLD: 3 %
CRP SERPL-MCNC: 4.3 MG/L (ref 0–5.1)
DEPRECATED RDW RBC AUTO: 13.7 % (ref 12.4–15.4)
EOSINOPHIL # BLD: 0.4 K/UL (ref 0–0.6)
EOSINOPHIL NFR BLD: 5 %
ERYTHROCYTE [SEDIMENTATION RATE] IN BLOOD BY WESTERGREN METHOD: 18 MM/HR (ref 0–20)
HCT VFR BLD AUTO: 45.9 % (ref 40.5–52.5)
HGB BLD-MCNC: 15.7 G/DL (ref 13.5–17.5)
LYMPHOCYTES # BLD: 1.3 K/UL (ref 1–5.1)
LYMPHOCYTES NFR BLD: 16 %
MCH RBC QN AUTO: 30.1 PG (ref 26–34)
MCHC RBC AUTO-ENTMCNC: 34.3 G/DL (ref 31–36)
MCV RBC AUTO: 87.7 FL (ref 80–100)
MONOCYTES # BLD: 0.4 K/UL (ref 0–1.3)
MONOCYTES NFR BLD: 5 %
NEUTROPHILS # BLD: 5.1 K/UL (ref 1.7–7.7)
NEUTROPHILS NFR BLD: 66 %
NEUTS BAND NFR BLD MANUAL: 3 % (ref 0–7)
PLATELET # BLD AUTO: 218 K/UL (ref 135–450)
PMV BLD AUTO: 9.1 FL (ref 5–10.5)
RBC # BLD AUTO: 5.24 M/UL (ref 4.2–5.9)
SLIDE REVIEW: NORMAL
VARIANT LYMPHS NFR BLD MANUAL: 2 % (ref 0–6)
WBC # BLD AUTO: 7.4 K/UL (ref 4–11)

## 2023-11-17 RX ORDER — DOXYCYCLINE HYCLATE 100 MG/1
100 CAPSULE ORAL 2 TIMES DAILY
Qty: 14 CAPSULE | Refills: 0 | COMMUNITY
Start: 2023-11-13 | End: 2023-11-20

## 2023-11-17 RX ORDER — SULFAMETHOXAZOLE AND TRIMETHOPRIM 800; 160 MG/1; MG/1
1 TABLET ORAL 2 TIMES DAILY
Qty: 20 TABLET | Refills: 0 | Status: SHIPPED | OUTPATIENT
Start: 2023-11-17 | End: 2023-11-27

## 2023-11-17 ASSESSMENT — ENCOUNTER SYMPTOMS
VISUAL CHANGE: 0
BLURRED VISION: 0

## 2023-11-17 NOTE — PROGRESS NOTES
Ethan Anderson (:  1940) is a 81 y.o. male,Established patient, here for evaluation of the following chief complaint(s):  Cellulitis (Right foot, went to [facility] ER 23)         ASSESSMENT/PLAN:  1. Cellulitis of foot  -     CBC with Auto Differential; Future  -     Sedimentation Rate; Future  -     C-Reactive Protein; Future  -     sulfamethoxazole-trimethoprim (BACTRIM DS;SEPTRA DS) 800-160 MG per tablet; Take 1 tablet by mouth 2 times daily for 10 days, Disp-20 tablet, R-0Normal  2. Type 2 diabetes mellitus without complication, without long-term current use of insulin (HCC)  Patient was seen in the emergency room for cellulitis he was started on doxycycline some of the pain has improved but her erythema has not changed much at this point will magaly switch him over to Bactrim and see if that will give us better response if the patient's symptoms do not improve or if his erythema gets worse the patient probably will need to be on IV antibiotics he did have arterial Doppler 4 years ago which showed good blood flow to both legs his pulses though are weak now and if he does not respond rather quickly we may have to repeat dose    The patient sugar is elevated he is strongly encouraged to take his insulin as directed    No follow-ups on file.          Subjective   SUBJECTIVE/OBJECTIVE:    Lab Review   Lab Results   Component Value Date/Time     2023 10:32 AM     05/10/2023 01:38 PM     2023 12:10 PM    K 3.5 2023 10:32 AM    K 4.6 05/10/2023 01:38 PM    K 3.5 2023 12:10 PM    K 3.8 09/15/2022 02:30 PM    K 3.8 2018 02:34 AM    CO2 28 2023 10:32 AM    CO2 27 05/10/2023 01:38 PM    CO2 23 2023 12:10 PM    BUN 18 2023 10:32 AM    BUN 14 05/10/2023 01:38 PM    BUN 17 2023 12:10 PM    CREATININE 1.3 2023 10:32 AM    CREATININE 1.1 05/10/2023 01:38 PM    CREATININE 1.0 2023 12:10 PM    GLUCOSE 59 2023 10:32 AM    GLUCOSE

## 2023-11-21 ENCOUNTER — OFFICE VISIT (OUTPATIENT)
Dept: INTERNAL MEDICINE CLINIC | Age: 83
End: 2023-11-21

## 2023-11-21 VITALS
OXYGEN SATURATION: 99 % | DIASTOLIC BLOOD PRESSURE: 68 MMHG | WEIGHT: 213 LBS | SYSTOLIC BLOOD PRESSURE: 116 MMHG | HEART RATE: 72 BPM | BODY MASS INDEX: 29.92 KG/M2

## 2023-11-21 DIAGNOSIS — I10 HTN (HYPERTENSION), BENIGN: Primary | ICD-10-CM

## 2023-11-21 DIAGNOSIS — E11.9 TYPE 2 DIABETES MELLITUS WITHOUT COMPLICATION, WITHOUT LONG-TERM CURRENT USE OF INSULIN (HCC): ICD-10-CM

## 2023-11-21 DIAGNOSIS — I10 HTN (HYPERTENSION), BENIGN: ICD-10-CM

## 2023-11-21 DIAGNOSIS — Z00.00 MEDICARE ANNUAL WELLNESS VISIT, SUBSEQUENT: ICD-10-CM

## 2023-11-21 DIAGNOSIS — L03.119 CELLULITIS OF FOOT: ICD-10-CM

## 2023-11-21 DIAGNOSIS — E03.9 ACQUIRED HYPOTHYROIDISM: ICD-10-CM

## 2023-11-21 LAB
ANION GAP SERPL CALCULATED.3IONS-SCNC: 15 MMOL/L (ref 3–16)
BUN SERPL-MCNC: 31 MG/DL (ref 7–20)
CALCIUM SERPL-MCNC: 9.8 MG/DL (ref 8.3–10.6)
CHLORIDE SERPL-SCNC: 98 MMOL/L (ref 99–110)
CHOLEST SERPL-MCNC: 135 MG/DL (ref 0–199)
CO2 SERPL-SCNC: 24 MMOL/L (ref 21–32)
CREAT SERPL-MCNC: 1.9 MG/DL (ref 0.8–1.3)
GFR SERPLBLD CREATININE-BSD FMLA CKD-EPI: 35 ML/MIN/{1.73_M2}
GLUCOSE SERPL-MCNC: 202 MG/DL (ref 70–99)
HDLC SERPL-MCNC: 71 MG/DL (ref 40–60)
LDLC SERPL CALC-MCNC: 43 MG/DL
POTASSIUM SERPL-SCNC: 4.4 MMOL/L (ref 3.5–5.1)
SODIUM SERPL-SCNC: 137 MMOL/L (ref 136–145)
TRIGL SERPL-MCNC: 107 MG/DL (ref 0–150)
TSH SERPL DL<=0.005 MIU/L-ACNC: 4.37 UIU/ML (ref 0.27–4.2)
VLDLC SERPL CALC-MCNC: 21 MG/DL

## 2023-11-21 RX ORDER — GABAPENTIN 100 MG/1
100 CAPSULE ORAL NIGHTLY
Qty: 90 CAPSULE | Refills: 0 | Status: SHIPPED | OUTPATIENT
Start: 2023-11-21 | End: 2024-02-19

## 2023-11-21 ASSESSMENT — ENCOUNTER SYMPTOMS
CONSTIPATION: 0
SHORTNESS OF BREATH: 0
BLOOD IN STOOL: 0
ABDOMINAL PAIN: 0
COUGH: 0
SINUS PAIN: 0
COLOR CHANGE: 0
WHEEZING: 0
CHEST TIGHTNESS: 0

## 2023-11-21 ASSESSMENT — PATIENT HEALTH QUESTIONNAIRE - PHQ9
10. IF YOU CHECKED OFF ANY PROBLEMS, HOW DIFFICULT HAVE THESE PROBLEMS MADE IT FOR YOU TO DO YOUR WORK, TAKE CARE OF THINGS AT HOME, OR GET ALONG WITH OTHER PEOPLE: 1
6. FEELING BAD ABOUT YOURSELF - OR THAT YOU ARE A FAILURE OR HAVE LET YOURSELF OR YOUR FAMILY DOWN: 3
SUM OF ALL RESPONSES TO PHQ QUESTIONS 1-9: 13
1. LITTLE INTEREST OR PLEASURE IN DOING THINGS: 2
8. MOVING OR SPEAKING SO SLOWLY THAT OTHER PEOPLE COULD HAVE NOTICED. OR THE OPPOSITE, BEING SO FIGETY OR RESTLESS THAT YOU HAVE BEEN MOVING AROUND A LOT MORE THAN USUAL: 0
SUM OF ALL RESPONSES TO PHQ QUESTIONS 1-9: 13
3. TROUBLE FALLING OR STAYING ASLEEP: 0
SUM OF ALL RESPONSES TO PHQ9 QUESTIONS 1 & 2: 4
5. POOR APPETITE OR OVEREATING: 0
SUM OF ALL RESPONSES TO PHQ QUESTIONS 1-9: 13
7. TROUBLE CONCENTRATING ON THINGS, SUCH AS READING THE NEWSPAPER OR WATCHING TELEVISION: 3
SUM OF ALL RESPONSES TO PHQ QUESTIONS 1-9: 13
2. FEELING DOWN, DEPRESSED OR HOPELESS: 2
9. THOUGHTS THAT YOU WOULD BE BETTER OFF DEAD, OR OF HURTING YOURSELF: 0
4. FEELING TIRED OR HAVING LITTLE ENERGY: 3

## 2023-11-21 NOTE — PROGRESS NOTES
Isaac Hart (:  1940) is a 81 y.o. male,Established patient, here for evaluation of the following chief complaint(s):  Follow-up (Continues antibiotic at this time for foot. ) and Foot Pain (Moving upward.)         ASSESSMENT/PLAN:  1. HTN (hypertension), benign  -     Basic Metabolic Panel; Future  2. Type 2 diabetes mellitus without complication, without long-term current use of insulin (HCC)  -     Hemoglobin A1C; Future  -     Lipid Panel; Future  -     gabapentin (NEURONTIN) 100 MG capsule; Take 1 capsule by mouth at bedtime for 90 days. , Disp-90 capsule, R-0Normal  3. Cellulitis of foot  4. Acquired hypothyroidism  -     TSH; Future  Has had significantly improved the area is not shiny anymore there is very little tenderness I think by the time he is done with his antibiotics this should be completely treated    The patient's high blood pressure is very well controlled today and he is still continuing to take his medication    Again to check the patient blood sugar today and make sure that he is on target last time he was slightly elevated    Patient has been instructed on getting a living well as well as to increase his level of exercise and he is coordinating his situation at home and will let me know if he needs any help  Medicare Annual Wellness Visit  Name: Yolette Pencil Date: 2023   MRN: [id number] Sex: Male   Age: 81 y.o. Ethnicity: Non- / Non    : 1940 Race: White (non-)      Isaac Hart is here for Follow-up (Continues antibiotic at this time for foot. ) and Foot Pain (Moving upward.)    Screenings for behavioral, psychosocial and functional/safety risks, and cognitive dysfunction are all negative except as indicated below. These results, as well as other patient data from the [address] [facility] form, are documented in Flowsheets linked to this Encounter.     Allergies   Allergen Reactions    Pcn [Penicillins] Anaphylaxis and

## 2023-11-22 LAB
EST. AVERAGE GLUCOSE BLD GHB EST-MCNC: 168.6 MG/DL
HBA1C MFR BLD: 7.5 %

## 2023-11-22 NOTE — RESULT ENCOUNTER NOTE
Please let the patient know that results were acceptable with exception with a big jump in his kidney function it could be related to his antibiotic I would like the patient to drink lots of fluids and repeat this test in the next 48 hours

## 2023-11-24 DIAGNOSIS — I10 HTN (HYPERTENSION), BENIGN: ICD-10-CM

## 2023-11-24 LAB
ANION GAP SERPL CALCULATED.3IONS-SCNC: 8 MMOL/L (ref 3–16)
BUN SERPL-MCNC: 31 MG/DL (ref 7–20)
CALCIUM SERPL-MCNC: 9.6 MG/DL (ref 8.3–10.6)
CHLORIDE SERPL-SCNC: 95 MMOL/L (ref 99–110)
CO2 SERPL-SCNC: 30 MMOL/L (ref 21–32)
CREAT SERPL-MCNC: 1.8 MG/DL (ref 0.8–1.3)
GFR SERPLBLD CREATININE-BSD FMLA CKD-EPI: 37 ML/MIN/{1.73_M2}
GLUCOSE SERPL-MCNC: 156 MG/DL (ref 70–99)
POTASSIUM SERPL-SCNC: 3.8 MMOL/L (ref 3.5–5.1)
SODIUM SERPL-SCNC: 133 MMOL/L (ref 136–145)

## 2023-11-27 ENCOUNTER — TELEPHONE (OUTPATIENT)
Dept: INTERNAL MEDICINE CLINIC | Age: 83
End: 2023-11-27

## 2023-11-27 NOTE — TELEPHONE ENCOUNTER
Pt called in regards to get his lab results. I advised Pt that Dr. Richa Owens is out of office till Wednesday. Pt wanting to know if the covering provider could take a look at these. Please advise and call Pt if any questions or concerns.

## 2023-11-29 DIAGNOSIS — E11.9 TYPE 2 DIABETES MELLITUS WITHOUT COMPLICATION, WITHOUT LONG-TERM CURRENT USE OF INSULIN (HCC): ICD-10-CM

## 2023-11-29 RX ORDER — INSULIN LISPRO 200 [IU]/ML
INJECTION, SOLUTION SUBCUTANEOUS
Qty: 30 ML | Refills: 1 | Status: SHIPPED | OUTPATIENT
Start: 2023-11-29

## 2023-11-29 NOTE — TELEPHONE ENCOUNTER
Last OV: 11/21/2023  Next OV: 2/21/2024    Next appointment due: 02/21/24    Last fill:08/30/23  Refills:3

## 2023-11-29 NOTE — RESULT ENCOUNTER NOTE
Please let the patient know that results were improving but not back to normal I would like him to repeat a BMP today

## 2023-12-28 ENCOUNTER — OFFICE VISIT (OUTPATIENT)
Dept: INTERNAL MEDICINE CLINIC | Age: 83
End: 2023-12-28
Payer: MEDICARE

## 2023-12-28 VITALS
SYSTOLIC BLOOD PRESSURE: 112 MMHG | HEART RATE: 69 BPM | DIASTOLIC BLOOD PRESSURE: 70 MMHG | OXYGEN SATURATION: 96 % | WEIGHT: 204.8 LBS | BODY MASS INDEX: 29.39 KG/M2

## 2023-12-28 DIAGNOSIS — J40 BRONCHITIS: ICD-10-CM

## 2023-12-28 DIAGNOSIS — M54.6 ACUTE RIGHT-SIDED THORACIC BACK PAIN: ICD-10-CM

## 2023-12-28 DIAGNOSIS — J40 BRONCHITIS: Primary | ICD-10-CM

## 2023-12-28 DIAGNOSIS — I10 HTN (HYPERTENSION), BENIGN: ICD-10-CM

## 2023-12-28 LAB
ANION GAP SERPL CALCULATED.3IONS-SCNC: 9 MMOL/L (ref 3–16)
BASOPHILS # BLD: 0.2 K/UL (ref 0–0.2)
BASOPHILS NFR BLD: 2.9 %
BILIRUBIN, POC: NORMAL
BLOOD URINE, POC: NEGATIVE
BUN SERPL-MCNC: 11 MG/DL (ref 7–20)
CALCIUM SERPL-MCNC: 8.7 MG/DL (ref 8.3–10.6)
CHLORIDE SERPL-SCNC: 102 MMOL/L (ref 99–110)
CLARITY, POC: NORMAL
CO2 SERPL-SCNC: 25 MMOL/L (ref 21–32)
COLOR, POC: NORMAL
CREAT SERPL-MCNC: 1.2 MG/DL (ref 0.8–1.3)
DEPRECATED RDW RBC AUTO: 13.8 % (ref 12.4–15.4)
EOSINOPHIL # BLD: 0.4 K/UL (ref 0–0.6)
EOSINOPHIL NFR BLD: 5.3 %
GFR SERPLBLD CREATININE-BSD FMLA CKD-EPI: >60 ML/MIN/{1.73_M2}
GLUCOSE SERPL-MCNC: 166 MG/DL (ref 70–99)
GLUCOSE URINE, POC: NEGATIVE
HCT VFR BLD AUTO: 43.6 % (ref 40.5–52.5)
HGB BLD-MCNC: 15 G/DL (ref 13.5–17.5)
KETONES, POC: NEGATIVE
LEUKOCYTE EST, POC: NEGATIVE
LYMPHOCYTES # BLD: 1.6 K/UL (ref 1–5.1)
LYMPHOCYTES NFR BLD: 23.3 %
MCH RBC QN AUTO: 30.3 PG (ref 26–34)
MCHC RBC AUTO-ENTMCNC: 34.4 G/DL (ref 31–36)
MCV RBC AUTO: 88 FL (ref 80–100)
MONOCYTES # BLD: 0.7 K/UL (ref 0–1.3)
MONOCYTES NFR BLD: 10.4 %
NEUTROPHILS # BLD: 3.9 K/UL (ref 1.7–7.7)
NEUTROPHILS NFR BLD: 58.1 %
NITRITE, POC: NEGATIVE
PH, POC: 5.5
PLATELET # BLD AUTO: 306 K/UL (ref 135–450)
PMV BLD AUTO: 8.2 FL (ref 5–10.5)
POTASSIUM SERPL-SCNC: 4.3 MMOL/L (ref 3.5–5.1)
PROTEIN, POC: NORMAL
RBC # BLD AUTO: 4.95 M/UL (ref 4.2–5.9)
SODIUM SERPL-SCNC: 136 MMOL/L (ref 136–145)
SPECIFIC GRAVITY, POC: >=1.03
UROBILINOGEN, POC: NORMAL
WBC # BLD AUTO: 6.7 K/UL (ref 4–11)

## 2023-12-28 PROCEDURE — G8427 DOCREV CUR MEDS BY ELIG CLIN: HCPCS | Performed by: INTERNAL MEDICINE

## 2023-12-28 PROCEDURE — G8484 FLU IMMUNIZE NO ADMIN: HCPCS | Performed by: INTERNAL MEDICINE

## 2023-12-28 PROCEDURE — 3078F DIAST BP <80 MM HG: CPT | Performed by: INTERNAL MEDICINE

## 2023-12-28 PROCEDURE — 1123F ACP DISCUSS/DSCN MKR DOCD: CPT | Performed by: INTERNAL MEDICINE

## 2023-12-28 PROCEDURE — 81002 URINALYSIS NONAUTO W/O SCOPE: CPT | Performed by: INTERNAL MEDICINE

## 2023-12-28 PROCEDURE — 3074F SYST BP LT 130 MM HG: CPT | Performed by: INTERNAL MEDICINE

## 2023-12-28 PROCEDURE — G8417 CALC BMI ABV UP PARAM F/U: HCPCS | Performed by: INTERNAL MEDICINE

## 2023-12-28 PROCEDURE — 99214 OFFICE O/P EST MOD 30 MIN: CPT | Performed by: INTERNAL MEDICINE

## 2023-12-28 PROCEDURE — 1036F TOBACCO NON-USER: CPT | Performed by: INTERNAL MEDICINE

## 2023-12-28 RX ORDER — METOLAZONE 10 MG/1
10 TABLET ORAL DAILY
COMMUNITY
Start: 2023-10-27

## 2023-12-28 RX ORDER — LEVOFLOXACIN 500 MG/1
500 TABLET, FILM COATED ORAL DAILY
Qty: 7 TABLET | Refills: 0 | Status: SHIPPED | OUTPATIENT
Start: 2023-12-28 | End: 2024-01-04

## 2023-12-28 RX ORDER — COLCHICINE 0.6 MG/1
0.6 TABLET ORAL 2 TIMES DAILY
COMMUNITY
Start: 2023-12-20

## 2023-12-28 NOTE — PROGRESS NOTES
cough and sputum production. Musculoskeletal:  Positive for back pain. Objective   Physical Exam  Vitals and nursing note reviewed. Constitutional:       General: He is not in acute distress. Appearance: Normal appearance. HENT:      Head: Normocephalic and atraumatic. Right Ear: Tympanic membrane normal.      Left Ear: Tympanic membrane normal.      Nose: Nose normal.   Eyes:      Extraocular Movements: Extraocular movements intact. Conjunctiva/sclera: Conjunctivae normal.      Pupils: Pupils are equal, round, and reactive to light. Neck:      Vascular: No carotid bruit. Cardiovascular:      Rate and Rhythm: Normal rate and regular rhythm. Pulses: Normal pulses. Heart sounds: No murmur heard. Pulmonary:      Effort: Pulmonary effort is normal. No respiratory distress. Breath sounds: Normal breath sounds. Abdominal:      General: Abdomen is flat. Bowel sounds are normal. There is no distension. Palpations: Abdomen is soft. Tenderness: There is no abdominal tenderness. Musculoskeletal:         General: No swelling, tenderness or deformity. Cervical back: Normal range of motion and neck supple. No rigidity or tenderness. Right lower leg: No edema. Left lower leg: No edema. Lymphadenopathy:      Cervical: No cervical adenopathy. Skin:     Coloration: Skin is not jaundiced. Findings: No bruising, erythema or lesion. Neurological:      General: No focal deficit present. Mental Status: He is alert and oriented to person, place, and time. Cranial Nerves: No cranial nerve deficit. Motor: No weakness. Gait: Gait normal.            This dictation was generated by voice recognition computer software. Although all attempts are made to edit the dictation for accuracy, there may be errors in the transcription that are not intended. An electronic signature was used to authenticate this note.     --Angela Hendrix MD

## 2024-01-02 ENCOUNTER — HOSPITAL ENCOUNTER (OUTPATIENT)
Age: 84
Discharge: HOME | End: 2024-01-02
Payer: MEDICARE

## 2024-01-02 ENCOUNTER — HOSPITAL ENCOUNTER (OUTPATIENT)
Dept: GENERAL RADIOLOGY | Age: 84
Discharge: HOME | End: 2024-01-02
Payer: MEDICARE

## 2024-01-02 DIAGNOSIS — J40 BRONCHITIS: ICD-10-CM

## 2024-01-02 PROCEDURE — 71046 X-RAY EXAM CHEST 2 VIEWS: CPT

## 2024-02-21 ENCOUNTER — OFFICE VISIT (OUTPATIENT)
Dept: INTERNAL MEDICINE CLINIC | Age: 84
End: 2024-02-21

## 2024-02-21 VITALS
BODY MASS INDEX: 29.35 KG/M2 | HEIGHT: 70 IN | SYSTOLIC BLOOD PRESSURE: 124 MMHG | HEART RATE: 81 BPM | DIASTOLIC BLOOD PRESSURE: 60 MMHG | WEIGHT: 205 LBS | OXYGEN SATURATION: 97 %

## 2024-02-21 DIAGNOSIS — E78.5 DYSLIPIDEMIA: ICD-10-CM

## 2024-02-21 DIAGNOSIS — I10 HTN (HYPERTENSION), BENIGN: ICD-10-CM

## 2024-02-21 DIAGNOSIS — E03.4 HYPOTHYROIDISM DUE TO ACQUIRED ATROPHY OF THYROID: ICD-10-CM

## 2024-02-21 DIAGNOSIS — I50.32 CHRONIC DIASTOLIC HEART FAILURE (HCC): ICD-10-CM

## 2024-02-21 DIAGNOSIS — E11.9 TYPE 2 DIABETES MELLITUS WITHOUT COMPLICATION, WITHOUT LONG-TERM CURRENT USE OF INSULIN (HCC): ICD-10-CM

## 2024-02-21 DIAGNOSIS — I50.32 CHRONIC DIASTOLIC HEART FAILURE (HCC): Primary | ICD-10-CM

## 2024-02-21 LAB
ALBUMIN SERPL-MCNC: 3.7 G/DL (ref 3.4–5)
ALBUMIN/GLOB SERPL: 1.3 {RATIO} (ref 1.1–2.2)
ALP SERPL-CCNC: 71 U/L (ref 40–129)
ALT SERPL-CCNC: 25 U/L (ref 10–40)
ANION GAP SERPL CALCULATED.3IONS-SCNC: 11 MMOL/L (ref 3–16)
AST SERPL-CCNC: 31 U/L (ref 15–37)
BASOPHILS # BLD: 0.1 K/UL (ref 0–0.2)
BASOPHILS NFR BLD: 1.5 %
BILIRUB SERPL-MCNC: 0.8 MG/DL (ref 0–1)
BUN SERPL-MCNC: 19 MG/DL (ref 7–20)
CALCIUM SERPL-MCNC: 9.1 MG/DL (ref 8.3–10.6)
CHLORIDE SERPL-SCNC: 98 MMOL/L (ref 99–110)
CHOLEST SERPL-MCNC: 150 MG/DL (ref 0–199)
CO2 SERPL-SCNC: 28 MMOL/L (ref 21–32)
CREAT SERPL-MCNC: 1.2 MG/DL (ref 0.8–1.3)
DEPRECATED RDW RBC AUTO: 14.5 % (ref 12.4–15.4)
EOSINOPHIL # BLD: 0.3 K/UL (ref 0–0.6)
EOSINOPHIL NFR BLD: 3.6 %
GFR SERPLBLD CREATININE-BSD FMLA CKD-EPI: >60 ML/MIN/{1.73_M2}
GLUCOSE SERPL-MCNC: 189 MG/DL (ref 70–99)
HCT VFR BLD AUTO: 40.4 % (ref 40.5–52.5)
HDLC SERPL-MCNC: 58 MG/DL (ref 40–60)
HGB BLD-MCNC: 13.7 G/DL (ref 13.5–17.5)
LDLC SERPL CALC-MCNC: 48 MG/DL
LYMPHOCYTES # BLD: 1.8 K/UL (ref 1–5.1)
LYMPHOCYTES NFR BLD: 22.4 %
MCH RBC QN AUTO: 29.5 PG (ref 26–34)
MCHC RBC AUTO-ENTMCNC: 34 G/DL (ref 31–36)
MCV RBC AUTO: 86.9 FL (ref 80–100)
MONOCYTES # BLD: 0.6 K/UL (ref 0–1.3)
MONOCYTES NFR BLD: 7.7 %
NEUTROPHILS # BLD: 5.1 K/UL (ref 1.7–7.7)
NEUTROPHILS NFR BLD: 64.8 %
PLATELET # BLD AUTO: 193 K/UL (ref 135–450)
PMV BLD AUTO: 8.3 FL (ref 5–10.5)
POTASSIUM SERPL-SCNC: 3.2 MMOL/L (ref 3.5–5.1)
PROT SERPL-MCNC: 6.5 G/DL (ref 6.4–8.2)
RBC # BLD AUTO: 4.65 M/UL (ref 4.2–5.9)
SODIUM SERPL-SCNC: 137 MMOL/L (ref 136–145)
TRIGL SERPL-MCNC: 218 MG/DL (ref 0–150)
TSH SERPL DL<=0.005 MIU/L-ACNC: 5.71 UIU/ML (ref 0.27–4.2)
VLDLC SERPL CALC-MCNC: 44 MG/DL
WBC # BLD AUTO: 7.9 K/UL (ref 4–11)

## 2024-02-21 RX ORDER — GABAPENTIN 100 MG/1
100 CAPSULE ORAL NIGHTLY
Qty: 30 CAPSULE | Refills: 0 | Status: SHIPPED | OUTPATIENT
Start: 2024-02-21 | End: 2024-03-22

## 2024-02-21 RX ORDER — ROSUVASTATIN CALCIUM 10 MG/1
20 TABLET, COATED ORAL DAILY
Qty: 30 TABLET | Refills: 5 | Status: SHIPPED | OUTPATIENT
Start: 2024-02-21

## 2024-02-21 RX ORDER — MONTELUKAST SODIUM 10 MG/1
10 TABLET ORAL DAILY
Qty: 30 TABLET | Refills: 5 | Status: SHIPPED | OUTPATIENT
Start: 2024-02-21

## 2024-02-21 RX ORDER — LEVOTHYROXINE SODIUM 0.1 MG/1
100 TABLET ORAL DAILY
Qty: 30 TABLET | Refills: 5 | Status: SHIPPED | OUTPATIENT
Start: 2024-02-21

## 2024-02-21 ASSESSMENT — PATIENT HEALTH QUESTIONNAIRE - PHQ9
1. LITTLE INTEREST OR PLEASURE IN DOING THINGS: 3
SUM OF ALL RESPONSES TO PHQ9 QUESTIONS 1 & 2: 5
SUM OF ALL RESPONSES TO PHQ QUESTIONS 1-9: 11
2. FEELING DOWN, DEPRESSED OR HOPELESS: 2
8. MOVING OR SPEAKING SO SLOWLY THAT OTHER PEOPLE COULD HAVE NOTICED. OR THE OPPOSITE, BEING SO FIGETY OR RESTLESS THAT YOU HAVE BEEN MOVING AROUND A LOT MORE THAN USUAL: 0
SUM OF ALL RESPONSES TO PHQ QUESTIONS 1-9: 11
4. FEELING TIRED OR HAVING LITTLE ENERGY: 2
7. TROUBLE CONCENTRATING ON THINGS, SUCH AS READING THE NEWSPAPER OR WATCHING TELEVISION: 0
5. POOR APPETITE OR OVEREATING: 2
6. FEELING BAD ABOUT YOURSELF - OR THAT YOU ARE A FAILURE OR HAVE LET YOURSELF OR YOUR FAMILY DOWN: 0
SUM OF ALL RESPONSES TO PHQ QUESTIONS 1-9: 11
9. THOUGHTS THAT YOU WOULD BE BETTER OFF DEAD, OR OF HURTING YOURSELF: 0
SUM OF ALL RESPONSES TO PHQ QUESTIONS 1-9: 11
3. TROUBLE FALLING OR STAYING ASLEEP: 2
10. IF YOU CHECKED OFF ANY PROBLEMS, HOW DIFFICULT HAVE THESE PROBLEMS MADE IT FOR YOU TO DO YOUR WORK, TAKE CARE OF THINGS AT HOME, OR GET ALONG WITH OTHER PEOPLE: 1

## 2024-02-21 ASSESSMENT — ENCOUNTER SYMPTOMS
COUGH: 0
CONSTIPATION: 0
SHORTNESS OF BREATH: 0
ABDOMINAL PAIN: 0
WHEEZING: 0
SINUS PAIN: 0
COLOR CHANGE: 0
BLOOD IN STOOL: 0
CHEST TIGHTNESS: 0

## 2024-02-21 NOTE — PROGRESS NOTES
Demetrio Teran (:  1940) is a 83 y.o. male,Established patient, here for evaluation of the following chief complaint(s):  3 Month Follow-Up         ASSESSMENT/PLAN:  1. Chronic diastolic heart failure (HCC)  -     CBC with Auto Differential; Future  -     Comprehensive Metabolic Panel; Future  2. Type 2 diabetes mellitus without complication, without long-term current use of insulin (HCC)  -     Hemoglobin A1C; Future  -     Lipid Panel; Future  -     rosuvastatin (CRESTOR) 10 MG tablet; Take 2 tablets by mouth daily, Disp-30 tablet, R-5Normal  -     gabapentin (NEURONTIN) 100 MG capsule; Take 1 capsule by mouth nightly for 30 days., Disp-30 capsule, R-0Normal  3. HTN (hypertension), benign  4. Hypothyroidism due to acquired atrophy of thyroid  -     levothyroxine (SYNTHROID) 100 MCG tablet; Take 1 tablet by mouth Daily, Disp-30 tablet, R-5Normal  -     TSH; Future  5. Dyslipidemia  -     rosuvastatin (CRESTOR) 10 MG tablet; Take 2 tablets by mouth daily, Disp-30 tablet, R-5Normal    Overall the patient seems to be doing very well after his surgery at this point he is medically stable    Again check his diabetic markers and see how he is doing today    Patient had his medications reevaluated and refills were sent      No follow-ups on file.         Subjective   SUBJECTIVE/OBJECTIVE:    Lab Review   Lab Results   Component Value Date/Time     2023 04:14 PM     2023 07:00 PM     2023 10:08 AM    K 4.3 2023 04:14 PM    K 3.5 2023 07:00 PM    K 3.8 2023 10:08 AM    K 3.5 2023 12:10 PM    K 3.8 2018 02:34 AM    CO2 25 2023 04:14 PM    CO2 26 2023 07:00 PM    CO2 30 2023 10:08 AM    BUN 11 2023 04:14 PM    BUN 14 2023 07:00 PM    BUN 31 2023 10:08 AM    CREATININE 1.2 2023 04:14 PM    CREATININE 1.1 2023 07:00 PM    CREATININE 1.8 2023 10:08 AM    GLUCOSE 166 2023 04:14 PM    GLUCOSE

## 2024-02-22 LAB
EST. AVERAGE GLUCOSE BLD GHB EST-MCNC: 182.9 MG/DL
HBA1C MFR BLD: 8 %

## 2024-02-29 ENCOUNTER — OFFICE VISIT (OUTPATIENT)
Dept: ENT CLINIC | Age: 84
End: 2024-02-29
Payer: MEDICARE

## 2024-02-29 VITALS
DIASTOLIC BLOOD PRESSURE: 82 MMHG | WEIGHT: 204 LBS | HEIGHT: 70 IN | TEMPERATURE: 97.9 F | OXYGEN SATURATION: 99 % | HEART RATE: 98 BPM | SYSTOLIC BLOOD PRESSURE: 137 MMHG | BODY MASS INDEX: 29.2 KG/M2

## 2024-02-29 DIAGNOSIS — H90.3 BILATERAL SENSORINEURAL HEARING LOSS: Primary | Chronic | ICD-10-CM

## 2024-02-29 PROCEDURE — 1123F ACP DISCUSS/DSCN MKR DOCD: CPT | Performed by: OTOLARYNGOLOGY

## 2024-02-29 PROCEDURE — G8484 FLU IMMUNIZE NO ADMIN: HCPCS | Performed by: OTOLARYNGOLOGY

## 2024-02-29 PROCEDURE — 3079F DIAST BP 80-89 MM HG: CPT | Performed by: OTOLARYNGOLOGY

## 2024-02-29 PROCEDURE — G8417 CALC BMI ABV UP PARAM F/U: HCPCS | Performed by: OTOLARYNGOLOGY

## 2024-02-29 PROCEDURE — G8428 CUR MEDS NOT DOCUMENT: HCPCS | Performed by: OTOLARYNGOLOGY

## 2024-02-29 PROCEDURE — 99212 OFFICE O/P EST SF 10 MIN: CPT | Performed by: OTOLARYNGOLOGY

## 2024-02-29 PROCEDURE — 1036F TOBACCO NON-USER: CPT | Performed by: OTOLARYNGOLOGY

## 2024-02-29 PROCEDURE — 3075F SYST BP GE 130 - 139MM HG: CPT | Performed by: OTOLARYNGOLOGY

## 2024-02-29 NOTE — PROGRESS NOTES
CHIEF COMPLAINT  Chief Complaint   Patient presents with    Ear Problem    Hearing Loss       HISTORY OF PRESENT ILLNESS          Demetrio Teran is a 83 y.o. male who presented today for recheck of his ears and possible ear cleaning.  He stated that he is not hearing as well as usual and thinks his ears need cleaning.        REVIEW OF SYSTEMS  Constitutional:  Denied fever.  ENT/sinus:  Denied otalgia, otorrhea, nasal pain, rhinorrhea, sore throat, and sinus/facial pain.        EXAMINATION    WDWN, NAD  Face:  Normal skin with no lesions detected.    Voice:  Normal, with no hoarseness, breathiness, or hot potato quality.  Ears:  The EACs were clear and normal, with no cerumen impaction bilaterally.  TMs were dul, thick, non-erythematous, with no NETO, and normally mobile to pneumatic.  The mastoids and pinnae were normal.      I performed this head and neck physical exam personally.        IMPRESSION / DIAGNOSES / ORDERS:   Demetrio was seen today for ear problem and hearing loss.    Diagnoses and all orders for this visit:    Bilateral sensorineural hearing loss       RECOMMENDATIONS / PLAN:   Patient Instructions   See your hearing aid provider to have your hearing aid devices function checked.          Mannie Mcpherson MD    [facility]  Department of Otolaryngology/Head and Neck Surgery  [facility]  [address], [address]  [place] [address]  [phone]

## 2024-03-21 DIAGNOSIS — E87.6 HYPOKALEMIA: ICD-10-CM

## 2024-03-21 LAB
ANION GAP SERPL CALCULATED.3IONS-SCNC: 11 MMOL/L (ref 3–16)
BUN SERPL-MCNC: 13 MG/DL (ref 7–20)
CALCIUM SERPL-MCNC: 9.3 MG/DL (ref 8.3–10.6)
CHLORIDE SERPL-SCNC: 99 MMOL/L (ref 99–110)
CO2 SERPL-SCNC: 27 MMOL/L (ref 21–32)
CREAT SERPL-MCNC: 1.4 MG/DL (ref 0.8–1.3)
GFR SERPLBLD CREATININE-BSD FMLA CKD-EPI: 50 ML/MIN/{1.73_M2}
GLUCOSE SERPL-MCNC: 437 MG/DL (ref 70–99)
POTASSIUM SERPL-SCNC: 3.4 MMOL/L (ref 3.5–5.1)
SODIUM SERPL-SCNC: 137 MMOL/L (ref 136–145)

## 2024-03-25 DIAGNOSIS — I10 HTN (HYPERTENSION), BENIGN: Primary | ICD-10-CM

## 2024-03-25 NOTE — RESULT ENCOUNTER NOTE
Please let the patient know that results showed his kidney function to slightly get worse again and his potassium to be slightly reduced please have the patient repeat the blood test today

## 2024-03-26 DIAGNOSIS — I10 HTN (HYPERTENSION), BENIGN: ICD-10-CM

## 2024-03-26 LAB
ANION GAP SERPL CALCULATED.3IONS-SCNC: 12 MMOL/L (ref 3–16)
BUN SERPL-MCNC: 13 MG/DL (ref 7–20)
CALCIUM SERPL-MCNC: 9.2 MG/DL (ref 8.3–10.6)
CHLORIDE SERPL-SCNC: 100 MMOL/L (ref 99–110)
CO2 SERPL-SCNC: 28 MMOL/L (ref 21–32)
CREAT SERPL-MCNC: 1 MG/DL (ref 0.8–1.3)
GFR SERPLBLD CREATININE-BSD FMLA CKD-EPI: 75 ML/MIN/{1.73_M2}
GLUCOSE SERPL-MCNC: 181 MG/DL (ref 70–99)
POTASSIUM SERPL-SCNC: 3.5 MMOL/L (ref 3.5–5.1)
SODIUM SERPL-SCNC: 140 MMOL/L (ref 136–145)

## 2024-04-10 ENCOUNTER — TELEPHONE (OUTPATIENT)
Dept: INTERNAL MEDICINE CLINIC | Age: 84
End: 2024-04-10

## 2024-04-11 ENCOUNTER — OFFICE VISIT (OUTPATIENT)
Dept: INTERNAL MEDICINE CLINIC | Age: 84
End: 2024-04-11
Payer: MEDICARE

## 2024-04-11 VITALS
HEIGHT: 70 IN | OXYGEN SATURATION: 96 % | SYSTOLIC BLOOD PRESSURE: 112 MMHG | HEART RATE: 66 BPM | DIASTOLIC BLOOD PRESSURE: 78 MMHG | BODY MASS INDEX: 29.2 KG/M2 | WEIGHT: 204 LBS

## 2024-04-11 DIAGNOSIS — J06.9 URTI (ACUTE UPPER RESPIRATORY INFECTION): ICD-10-CM

## 2024-04-11 DIAGNOSIS — E11.9 TYPE 2 DIABETES MELLITUS WITHOUT COMPLICATION, WITHOUT LONG-TERM CURRENT USE OF INSULIN (HCC): Primary | ICD-10-CM

## 2024-04-11 PROCEDURE — 1123F ACP DISCUSS/DSCN MKR DOCD: CPT | Performed by: INTERNAL MEDICINE

## 2024-04-11 PROCEDURE — 3078F DIAST BP <80 MM HG: CPT | Performed by: INTERNAL MEDICINE

## 2024-04-11 PROCEDURE — G8417 CALC BMI ABV UP PARAM F/U: HCPCS | Performed by: INTERNAL MEDICINE

## 2024-04-11 PROCEDURE — 1036F TOBACCO NON-USER: CPT | Performed by: INTERNAL MEDICINE

## 2024-04-11 PROCEDURE — G8427 DOCREV CUR MEDS BY ELIG CLIN: HCPCS | Performed by: INTERNAL MEDICINE

## 2024-04-11 PROCEDURE — 3074F SYST BP LT 130 MM HG: CPT | Performed by: INTERNAL MEDICINE

## 2024-04-11 PROCEDURE — 99214 OFFICE O/P EST MOD 30 MIN: CPT | Performed by: INTERNAL MEDICINE

## 2024-04-11 PROCEDURE — 3052F HG A1C>EQUAL 8.0%<EQUAL 9.0%: CPT | Performed by: INTERNAL MEDICINE

## 2024-04-11 RX ORDER — PREDNISONE 10 MG/1
10 TABLET ORAL 2 TIMES DAILY
Qty: 10 TABLET | Refills: 0 | Status: SHIPPED | OUTPATIENT
Start: 2024-04-11 | End: 2024-04-16

## 2024-04-11 ASSESSMENT — ENCOUNTER SYMPTOMS
RHINORRHEA: 0
VISUAL CHANGE: 0
SHORTNESS OF BREATH: 1
COUGH: 1
SORE THROAT: 0

## 2024-04-11 NOTE — PROGRESS NOTES
08:24 AM    HCT 43.6 12/28/2023 04:14 PM    HCT 41.7 12/16/2023 07:00 PM    MCV 86.9 02/21/2024 08:24 AM    MCV 88.0 12/28/2023 04:14 PM    MCV 88.7 12/16/2023 07:00 PM     02/21/2024 08:24 AM     12/28/2023 04:14 PM     12/16/2023 07:00 PM     Lab Results   Component Value Date/Time    CHOL 150 02/21/2024 08:24 AM    CHOL 135 11/21/2023 09:14 AM    CHOL 145 08/23/2023 10:32 AM    TRIG 218 02/21/2024 08:24 AM    TRIG 107 11/21/2023 09:14 AM    TRIG 110 08/23/2023 10:32 AM    HDL 58 02/21/2024 08:24 AM    HDL 71 11/21/2023 09:14 AM    HDL 62 08/23/2023 10:32 AM    HDL 51 05/02/2011 09:43 AM    HDL 49 07/12/2010 06:30 PM           4/11/2024     8:51 AM 4/11/2024     8:16 AM 2/29/2024     2:16 PM   Vitals   SYSTOLIC  112 137   DIASTOLIC  78 82   Site   Left Upper Arm   Position   Sitting   Cuff Size   Large Adult   Pulse 66 42 98   Temp   97.9 °F (36.6 °C)   SpO2  96 % 99 %   Weight - Scale  204 lb 204 lb   Height  5' 10\" 5' 10\"   Body Mass Index  29.27 kg/m2 29.27 kg/m2       Cough  This is a chronic problem. The current episode started in the past 7 days. The problem has been waxing and waning. Associated symptoms include shortness of breath. Pertinent negatives include no chest pain, chills, ear congestion, rhinorrhea, sore throat, sweats or weight loss.   Diabetes  He presents for his follow-up diabetic visit. He has type 2 diabetes mellitus. Pertinent negatives for hypoglycemia include no sweats. Pertinent negatives for diabetes include no chest pain, no visual change, no weakness and no weight loss.       Review of Systems   Constitutional:  Negative for chills and weight loss.   HENT:  Negative for rhinorrhea and sore throat.    Respiratory:  Positive for cough and shortness of breath.    Cardiovascular:  Negative for chest pain.   Neurological:  Negative for weakness.          Objective   Physical Exam  Vitals and nursing note reviewed.   Constitutional:       General: He is not in acute

## 2024-04-17 ENCOUNTER — TELEPHONE (OUTPATIENT)
Dept: INTERNAL MEDICINE CLINIC | Age: 84
End: 2024-04-17

## 2024-04-17 RX ORDER — AZITHROMYCIN 250 MG/1
TABLET, FILM COATED ORAL
Qty: 6 TABLET | Refills: 0 | Status: SHIPPED | OUTPATIENT
Start: 2024-04-17 | End: 2024-04-27

## 2024-04-17 NOTE — TELEPHONE ENCOUNTER
Please advise the patient that I did send an antibiotic to his pharmacy start taking it right away and let me know how he feels in 48 hours

## 2024-04-17 NOTE — TELEPHONE ENCOUNTER
Pt came into the office, had appt with Dr. Duggan on 4/11. Pt was prescribed prednisone at appt. Pt states prednisone did help but now has a sore thoat & overall does not feel well. He is finished with antibiotic & prednisone, asks what his next steps should be. Please advise and call pt at [phone].

## 2024-05-07 ENCOUNTER — TELEPHONE (OUTPATIENT)
Dept: INTERNAL MEDICINE CLINIC | Age: 84
End: 2024-05-07

## 2024-05-07 DIAGNOSIS — E11.9 TYPE 2 DIABETES MELLITUS WITHOUT COMPLICATION, WITHOUT LONG-TERM CURRENT USE OF INSULIN (HCC): ICD-10-CM

## 2024-05-07 RX ORDER — GABAPENTIN 100 MG/1
100 CAPSULE ORAL NIGHTLY
Qty: 30 CAPSULE | Refills: 3 | Status: SHIPPED | OUTPATIENT
Start: 2024-05-07 | End: 2024-09-07

## 2024-05-07 NOTE — TELEPHONE ENCOUNTER
Felix from [facility] calling about the Rousuvastatin ---pt said he was only suppto take 1 a day not 2---- and on th Fursemide  he said only suppose to take 20 mg a day---please check on this and call Herber at [phone].  Thanks.

## 2024-05-08 DIAGNOSIS — E78.5 DYSLIPIDEMIA: ICD-10-CM

## 2024-05-08 DIAGNOSIS — E11.9 TYPE 2 DIABETES MELLITUS WITHOUT COMPLICATION, WITHOUT LONG-TERM CURRENT USE OF INSULIN (HCC): ICD-10-CM

## 2024-05-08 RX ORDER — ROSUVASTATIN CALCIUM 10 MG/1
20 TABLET, COATED ORAL DAILY
Qty: 60 TABLET | Refills: 2 | Status: SHIPPED | OUTPATIENT
Start: 2024-05-08

## 2024-05-08 NOTE — TELEPHONE ENCOUNTER
15 day supply prescribed, 30 day supply requested    Last OV: 4/11/2024  Next OV: 8/21/2024    Last fill: 2/21/24  #30  Refills: 5

## 2024-05-16 NOTE — TELEPHONE ENCOUNTER
Jeanne Whitley from [facility] called in regards to Pts insulin. Jeanne Whitley states when the new prescription was sent over it was sent in wrong again. The quantity on insulin lispro (HUMALOG KWIKPEN U-200) 200 UNIT/ML SOPN pen needs to be changed to 2 to 30. Jeanne Whitley also stated for the insulin lispro (HUMALOG) 100 UNIT/ML injection cartridge they need it resent in as 100 units/ml inject up to 100 units daily, quantity 30. Please advise and call Jeanne Whitley back if any questions or concerns. Jeanne Whitley advised that Dr. Glynn Beltran is out the rest of the week. Subjective:     Patient ID: Tarik Camara is a 70 year old male.    HPI    Patient comes in today with complaint of bilateral feet pain ever since he started chemotherapy has been having more pain patient also has neuropathy due to diabetes he is taking all tramadol patient also today complains of some cough some chest congestion sore throats been going on for 2 weeks now he was [place] and he got sick while there    History/Other:   Review of Systems   Constitutional: Negative.    HENT:  Positive for congestion.    Eyes: Negative.    Respiratory:  Positive for cough.    Cardiovascular: Negative.    Gastrointestinal: Negative.    Genitourinary: Negative.    Musculoskeletal: Negative.         Bl feet pain /numbness    Skin: Negative.    Psychiatric/Behavioral: Negative.       Current Outpatient Medications   Medication Sig Dispense Refill    azithromycin (ZITHROMAX Z-BEATRICE) 250 MG Oral Tab Take 2 tablets (500 mg total) by mouth daily for 1 day, THEN 1 tablet (250 mg total) daily for 4 days. 6 tablet 0    benzonatate 100 MG Oral Cap Take 1 capsule (100 mg total) by mouth 3 (three) times daily as needed for cough. 20 capsule 0    gabapentin 300 MG Oral Cap Take 1 capsule (300 mg total) by mouth 2 (two) times daily as needed. 60 capsule 2    traMADol 50 MG Oral Tab Take 1-2 tablets ( mg total) by mouth every 6 (six) hours as needed for Pain. 30 tablet 0    glipiZIDE ER 5 MG Oral Tablet 24 Hr Take 1 tablet (5 mg total) by mouth daily. 30 tablet 2    atorvastatin 20 MG Oral Tab Take 1 tablet (20 mg total) by mouth nightly. 90 tablet 3    lisinopril 30 MG Oral Tab Take 1 tablet (30 mg total) by mouth daily. 30 tablet 2    metoprolol succinate  MG Oral Tablet 24 Hr Take 1 tablet (100 mg total) by mouth daily. 90 tablet 1    prochlorperazine (COMPAZINE) 10 mg tablet Take 1 tablet (10 mg total) by mouth every 6 (six) hours as needed for Nausea. 30 tablet 3    ondansetron (ZOFRAN) 8 MG tablet Take 1 tablet (8 mg  total) by mouth every 8 (eight) hours as needed for Nausea. 30 tablet 3    aspirin 81 MG Oral Tab EC Take 1 tablet (81 mg total) by mouth daily.      metFORMIN 850 MG Oral Tab Take 1 tablet (850 mg total) by mouth 2 (two) times daily with meals. 180 tablet 3     Allergies:No Known Allergies    Past Medical History:    Bell's palsy    COPD (chronic obstructive pulmonary disease) (HCC)    Degenerative joint disease (DJD) of lumbar spine    Diabetes (HCC)    Diabetes mellitus, type II (HCC)    Heart attack (HCC)    High blood pressure    High cholesterol    Hyperlipidemia    Hypertension    Osteoarthritis    Sleep apnea    no CPAP      Past Surgical History:   Procedure Laterality Date    Appendectomy      Coronary stent placement      Palate/uvula surgery unlisted      Ventral hernia repair  2021    primary repair, ventral and umbilical hernias      Family History   Problem Relation Age of Onset    No Known Problems Mother     Heart Disorder Father     Diabetes Neg     Glaucoma Neg     Macular degeneration Neg       Social History:   Social History     Socioeconomic History    Marital status:     Number of children: 3   Occupational History    Occupation:    Tobacco Use    Smoking status: Former     Current packs/day: 0.00     Types: Cigarettes     Quit date: 3/19/2020     Years since quittin.1    Smokeless tobacco: Never    Tobacco comments:     Occasionally, not in last 6 months   Vaping Use    Vaping status: Never Used   Substance and Sexual Activity    Alcohol use: Not Currently    Drug use: Never     Social Determinants of Health     Financial Resource Strain: Low Risk  (2023)    Financial Resource Strain     Difficulty of Paying Living Expenses: Not hard at all     Med Affordability: No   Transportation Needs: No Transportation Needs (2023)    Transportation Needs     Lack of Transportation: No        Objective:   Physical Exam  Vitals and nursing note reviewed.    Constitutional:       Appearance: He is well-developed.   HENT:      Head: Normocephalic and atraumatic.      Right Ear: External ear normal.      Left Ear: External ear normal.      Nose: Nose normal.   Eyes:      Conjunctiva/sclera: Conjunctivae normal.      Pupils: Pupils are equal, round, and reactive to light.   Cardiovascular:      Rate and Rhythm: Normal rate and regular rhythm.      Heart sounds: Normal heart sounds.   Pulmonary:      Effort: Pulmonary effort is normal.      Breath sounds: Normal breath sounds.   Abdominal:      General: Bowel sounds are normal.      Palpations: Abdomen is soft.   Genitourinary:     Penis: Normal.       Prostate: Normal.      Rectum: Normal.   Musculoskeletal:         General: Normal range of motion.      Cervical back: Normal range of motion and neck supple.   Skin:     General: Skin is warm and dry.   Neurological:      Mental Status: He is alert and oriented to person, place, and time.      Deep Tendon Reflexes: Reflexes are normal and symmetric.         Assessment & Plan:   1. 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 Subacute cough we will treat with antibiotic and cough medicine let us know if not better   3. Chest congestion as above we will follow results       No orders of the defined types were placed in this encounter.      Meds This Visit:  Requested Prescriptions     Signed Prescriptions Disp Refills    azithromycin (ZITHROMAX Z-BEATRICE) 250 MG Oral Tab 6 tablet 0     Sig: Take 2 tablets (500 mg total) by mouth daily for 1 day, THEN 1 tablet (250 mg total) daily for 4 days.    benzonatate 100 MG Oral Cap 20 capsule 0     Sig: Take 1 capsule (100 mg total) by mouth 3 (three) times daily as needed for cough.    gabapentin 300 MG Oral Cap 60 capsule 2     Sig: Take 1 capsule (300 mg total) by mouth 2 (two) times daily as needed.       Imaging & Referrals:  None

## 2024-08-01 ENCOUNTER — HOSPITAL ENCOUNTER (OUTPATIENT)
Age: 84
Discharge: HOME | End: 2024-08-01
Payer: MEDICARE

## 2024-08-01 LAB
25(OH)D3 SERPL-MCNC: 52.4 NG/ML
ALBUMIN SERPL-MCNC: 3.9 G/DL (ref 3.4–5)
ALBUMIN/GLOB SERPL: 1.1 {RATIO} (ref 1.1–2.2)
ALP SERPL-CCNC: 66 U/L (ref 40–129)
ALT SERPL-CCNC: 18 U/L (ref 10–40)
ANION GAP SERPL CALCULATED.3IONS-SCNC: 12 MMOL/L (ref 3–16)
AST SERPL-CCNC: 20 U/L (ref 15–37)
BACTERIA URNS QL MICRO: NORMAL /HPF
BILIRUB SERPL-MCNC: 1.5 MG/DL (ref 0–1)
BILIRUB UR QL STRIP.AUTO: NEGATIVE
BUN SERPL-MCNC: 22 MG/DL (ref 7–20)
CALCIUM SERPL-MCNC: 9.6 MG/DL (ref 8.3–10.6)
CHLORIDE SERPL-SCNC: 96 MMOL/L (ref 99–110)
CHOLEST SERPL-MCNC: 161 MG/DL (ref 0–199)
CLARITY UR: CLEAR
CO2 SERPL-SCNC: 30 MMOL/L (ref 21–32)
COLOR UR: YELLOW
CREAT SERPL-MCNC: 1.3 MG/DL (ref 0.8–1.3)
DEPRECATED RDW RBC AUTO: 13.9 % (ref 12.4–15.4)
EPI CELLS #/AREA URNS AUTO: 1 /HPF (ref 0–5)
EST. AVERAGE GLUCOSE BLD GHB EST-MCNC: 197.3 MG/DL
GFR SERPLBLD CREATININE-BSD FMLA CKD-EPI: 54 ML/MIN/{1.73_M2}
GLUCOSE SERPL-MCNC: 184 MG/DL (ref 70–99)
GLUCOSE UR STRIP.AUTO-MCNC: NEGATIVE MG/DL
HBA1C MFR BLD: 8.5 %
HCT VFR BLD AUTO: 45 % (ref 40.5–52.5)
HDLC SERPL-MCNC: 69 MG/DL (ref 40–60)
HGB BLD-MCNC: 15.7 G/DL (ref 13.5–17.5)
HGB UR QL STRIP.AUTO: NEGATIVE
HYALINE CASTS #/AREA URNS AUTO: 1 /LPF (ref 0–8)
KETONES UR STRIP.AUTO-MCNC: NEGATIVE MG/DL
LDLC SERPL CALC-MCNC: 68 MG/DL
LEUKOCYTE ESTERASE UR QL STRIP.AUTO: ABNORMAL
MCH RBC QN AUTO: 30.2 PG (ref 26–34)
MCHC RBC AUTO-ENTMCNC: 35 G/DL (ref 31–36)
MCV RBC AUTO: 86.3 FL (ref 80–100)
NITRITE UR QL STRIP.AUTO: NEGATIVE
PH UR STRIP.AUTO: 5.5 [PH] (ref 5–8)
PLATELET # BLD AUTO: 162 K/UL (ref 135–450)
PMV BLD AUTO: 9 FL (ref 5–10.5)
POTASSIUM SERPL-SCNC: 3.3 MMOL/L (ref 3.5–5.1)
PROT SERPL-MCNC: 7.4 G/DL (ref 6.4–8.2)
PROT UR STRIP.AUTO-MCNC: ABNORMAL MG/DL
PSA SERPL DL<=0.01 NG/ML-MCNC: 1.63 NG/ML (ref 0–4)
RBC # BLD AUTO: 5.21 M/UL (ref 4.2–5.9)
RBC CLUMPS #/AREA URNS AUTO: 1 /HPF (ref 0–4)
SODIUM SERPL-SCNC: 138 MMOL/L (ref 136–145)
SP GR UR STRIP.AUTO: 1.01 (ref 1–1.03)
TRIGL SERPL-MCNC: 122 MG/DL (ref 0–150)
TSH SERPL DL<=0.005 MIU/L-ACNC: 3.13 UIU/ML (ref 0.27–4.2)
UA DIPSTICK W REFLEX MICRO PNL UR: YES
URN SPEC COLLECT METH UR: ABNORMAL
UROBILINOGEN UR STRIP-ACNC: 1 E.U./DL
VLDLC SERPL CALC-MCNC: 24 MG/DL
WBC # BLD AUTO: 7.4 K/UL (ref 4–11)
WBC #/AREA URNS AUTO: 4 /HPF (ref 0–5)

## 2024-08-01 PROCEDURE — 84443 ASSAY THYROID STIM HORMONE: CPT

## 2024-08-01 PROCEDURE — 83036 HEMOGLOBIN GLYCOSYLATED A1C: CPT

## 2024-08-01 PROCEDURE — 82306 VITAMIN D 25 HYDROXY: CPT

## 2024-08-01 PROCEDURE — 84153 ASSAY OF PSA TOTAL: CPT

## 2024-08-01 PROCEDURE — 81001 URINALYSIS AUTO W/SCOPE: CPT

## 2024-08-01 PROCEDURE — 80061 LIPID PANEL: CPT

## 2024-08-01 PROCEDURE — 85027 COMPLETE CBC AUTOMATED: CPT

## 2024-08-01 PROCEDURE — 80053 COMPREHEN METABOLIC PANEL: CPT

## 2024-08-21 ENCOUNTER — HOSPITAL ENCOUNTER (OUTPATIENT)
Age: 84
Discharge: HOME | End: 2024-08-21
Payer: MEDICARE

## 2024-08-21 ENCOUNTER — HOSPITAL ENCOUNTER (OUTPATIENT)
Dept: GENERAL RADIOLOGY | Age: 84
Discharge: HOME | End: 2024-08-21
Payer: MEDICARE

## 2024-08-21 ENCOUNTER — OFFICE VISIT (OUTPATIENT)
Dept: INTERNAL MEDICINE CLINIC | Age: 84
End: 2024-08-21
Payer: MEDICARE

## 2024-08-21 VITALS
HEART RATE: 74 BPM | SYSTOLIC BLOOD PRESSURE: 110 MMHG | BODY MASS INDEX: 30.98 KG/M2 | DIASTOLIC BLOOD PRESSURE: 60 MMHG | HEIGHT: 70 IN | OXYGEN SATURATION: 97 % | WEIGHT: 216.4 LBS

## 2024-08-21 DIAGNOSIS — Z00.00 MEDICARE ANNUAL WELLNESS VISIT, SUBSEQUENT: Primary | ICD-10-CM

## 2024-08-21 DIAGNOSIS — E78.5 DYSLIPIDEMIA: ICD-10-CM

## 2024-08-21 DIAGNOSIS — E03.4 HYPOTHYROIDISM DUE TO ACQUIRED ATROPHY OF THYROID: ICD-10-CM

## 2024-08-21 DIAGNOSIS — I10 HTN (HYPERTENSION), BENIGN: ICD-10-CM

## 2024-08-21 DIAGNOSIS — M25.531 RIGHT WRIST PAIN: ICD-10-CM

## 2024-08-21 DIAGNOSIS — E11.9 TYPE 2 DIABETES MELLITUS WITHOUT COMPLICATION, WITHOUT LONG-TERM CURRENT USE OF INSULIN (HCC): ICD-10-CM

## 2024-08-21 PROCEDURE — G8427 DOCREV CUR MEDS BY ELIG CLIN: HCPCS | Performed by: INTERNAL MEDICINE

## 2024-08-21 PROCEDURE — G8417 CALC BMI ABV UP PARAM F/U: HCPCS | Performed by: INTERNAL MEDICINE

## 2024-08-21 PROCEDURE — 3078F DIAST BP <80 MM HG: CPT | Performed by: INTERNAL MEDICINE

## 2024-08-21 PROCEDURE — 3052F HG A1C>EQUAL 8.0%<EQUAL 9.0%: CPT | Performed by: INTERNAL MEDICINE

## 2024-08-21 PROCEDURE — 1123F ACP DISCUSS/DSCN MKR DOCD: CPT | Performed by: INTERNAL MEDICINE

## 2024-08-21 PROCEDURE — 73110 X-RAY EXAM OF WRIST: CPT

## 2024-08-21 PROCEDURE — 3074F SYST BP LT 130 MM HG: CPT | Performed by: INTERNAL MEDICINE

## 2024-08-21 PROCEDURE — G0439 PPPS, SUBSEQ VISIT: HCPCS | Performed by: INTERNAL MEDICINE

## 2024-08-21 PROCEDURE — 1036F TOBACCO NON-USER: CPT | Performed by: INTERNAL MEDICINE

## 2024-08-21 PROCEDURE — 99214 OFFICE O/P EST MOD 30 MIN: CPT | Performed by: INTERNAL MEDICINE

## 2024-08-21 RX ORDER — FUROSEMIDE 40 MG/1
40 TABLET ORAL DAILY
Qty: 30 TABLET | Refills: 1 | Status: SHIPPED | OUTPATIENT
Start: 2024-08-21 | End: 2024-10-20

## 2024-08-21 ASSESSMENT — ENCOUNTER SYMPTOMS
BLOOD IN STOOL: 0
SHORTNESS OF BREATH: 0
SINUS PAIN: 0
WHEEZING: 0
COLOR CHANGE: 0
CONSTIPATION: 0
VISUAL CHANGE: 0
ABDOMINAL PAIN: 0
COUGH: 0
CHEST TIGHTNESS: 0

## 2024-08-21 ASSESSMENT — PATIENT HEALTH QUESTIONNAIRE - PHQ9
8. MOVING OR SPEAKING SO SLOWLY THAT OTHER PEOPLE COULD HAVE NOTICED. OR THE OPPOSITE, BEING SO FIGETY OR RESTLESS THAT YOU HAVE BEEN MOVING AROUND A LOT MORE THAN USUAL: NEARLY EVERY DAY
10. IF YOU CHECKED OFF ANY PROBLEMS, HOW DIFFICULT HAVE THESE PROBLEMS MADE IT FOR YOU TO DO YOUR WORK, TAKE CARE OF THINGS AT HOME, OR GET ALONG WITH OTHER PEOPLE: SOMEWHAT DIFFICULT
SUM OF ALL RESPONSES TO PHQ QUESTIONS 1-9: 9
2. FEELING DOWN, DEPRESSED OR HOPELESS: SEVERAL DAYS
7. TROUBLE CONCENTRATING ON THINGS, SUCH AS READING THE NEWSPAPER OR WATCHING TELEVISION: NOT AT ALL
SUM OF ALL RESPONSES TO PHQ QUESTIONS 1-9: 9
1. LITTLE INTEREST OR PLEASURE IN DOING THINGS: SEVERAL DAYS
9. THOUGHTS THAT YOU WOULD BE BETTER OFF DEAD, OR OF HURTING YOURSELF: NOT AT ALL
4. FEELING TIRED OR HAVING LITTLE ENERGY: NEARLY EVERY DAY
3. TROUBLE FALLING OR STAYING ASLEEP: SEVERAL DAYS
6. FEELING BAD ABOUT YOURSELF - OR THAT YOU ARE A FAILURE OR HAVE LET YOURSELF OR YOUR FAMILY DOWN: NOT AT ALL
SUM OF ALL RESPONSES TO PHQ QUESTIONS 1-9: 9
SUM OF ALL RESPONSES TO PHQ QUESTIONS 1-9: 9
5. POOR APPETITE OR OVEREATING: NOT AT ALL
SUM OF ALL RESPONSES TO PHQ9 QUESTIONS 1 & 2: 2

## 2024-08-21 NOTE — RESULT ENCOUNTER NOTE
Please let the patient know that results were acceptable no evidence of fracture advised patient he can come in for an injection tomorrow morning

## 2024-08-21 NOTE — PROGRESS NOTES
injection cartridge 1.67 units/h with 2 units bolus.  Maximum 100 units daily. Yes Fabi Anna, APRN - CNP   Insulin Disposable Pump (V-GO 40) 40 UNIT/24HR KIT 1 each by Does not apply route daily Yes Dick Duggan MD   pantoprazole (PROTONIX) 40 MG tablet Take 1 tablet by mouth daily Yes Nazanin Norman MD   potassium chloride (MICRO-K) 10 MEQ extended release capsule Take 1 capsule by mouth 2 times daily Yes Nazanin Norman MD   ibuprofen (ADVIL;MOTRIN) 200 MG tablet Take 1 tablet by mouth every 6 hours as needed for Pain 600 mg at hs general stiffness and aches and pains. Yes Nazanin Norman MD   Insulin Disposable Pump (V-GO 40) KIT 40 Units by Does not apply route daily Yes Linda Adams MD   vitamin D (ERGOCALCIFEROL) 12715 UNITS CAPS capsule Take 1 capsule by mouth every 30 days Yes Nazanin Norman MD   BD INSULIN SYRINGE ULTRAFINE 31G X 15/64\" 0.3 ML MISC  Yes Nazanin Norman MD   spironolactone (ALDACTONE) 50 MG tablet Take 1 tablet by mouth daily Taking half Yes Nazanin Norman MD   metoprolol tartrate (LOPRESSOR) 25 MG tablet Take 0.5 tablets by mouth 2 times daily  Patient not taking: Reported on 2/21/2024  Nazanin Norman MD   silodosin (RAPAFLO) 8 MG CAPS Take 1 capsule by mouth every evening  Dick Duggan MD   aspirin 81 MG tablet Take 1 tablet by mouth daily  Patient not taking: Reported on 8/21/2024  Nazanin Norman MD       CareTeam (Including outside providers/suppliers regularly involved in providing care):   Patient Care Team:  Dick Duggan MD as PCP - General (Internal Medicine)  Dick Duggan MD as PCP - Empaneled Provider      Reviewed and updated this visit:  Tobacco  Allergies  Meds  Problems  Med Hx  Surg Hx  Soc Hx  Fam Hx

## 2024-08-21 NOTE — PATIENT INSTRUCTIONS
people, walking is a good choice. Or you may want to swim, bike, or do other activities. Bit by bit, increase the time you're active every day. Try for at least 30 minutes on most days of the week.     Try to quit or cut back on using tobacco and other nicotine products. This includes smoking and vaping. If you need help quitting, talk to your doctor about stop-smoking programs and medicines. These can increase your chances of quitting for good. Quitting is one of the most important things you can do to protect your heart. It is never too late to quit. Try to avoid secondhand smoke too.     Stay at a weight that's healthy for you. Talk to your doctor if you need help losing weight.     Try to get 7 to 9 hours of sleep each night.     Limit alcohol to 2 drinks a day for men and 1 drink a day for women. Too much alcohol can cause health problems.     Manage other health problems such as diabetes, high blood pressure, and high cholesterol. If you think you may have a problem with alcohol or drug use, talk to your doctor.   Medicines    Take your medicines exactly as prescribed. Call your doctor if you think you are having a problem with your medicine.     If your doctor recommends aspirin, take the amount directed each day. Make sure you take aspirin and not another kind of pain reliever, such as acetaminophen (Tylenol).   When should you call for help?   Call 911 if you have symptoms of a heart attack. These may include:    Chest pain or pressure, or a strange feeling in the chest.     Sweating.     Shortness of breath.     Pain, pressure, or a strange feeling in the back, neck, jaw, or upper belly or in one or both shoulders or arms.     Lightheadedness or sudden weakness.     A fast or irregular heartbeat.   After you call 911, the  may tell you to chew 1 adult-strength or 2 to 4 low-dose aspirin. Wait for an ambulance. Do not try to drive yourself.  Watch closely for changes in your health, and be sure to

## 2024-08-23 ENCOUNTER — OFFICE VISIT (OUTPATIENT)
Dept: INTERNAL MEDICINE CLINIC | Age: 84
End: 2024-08-23

## 2024-08-23 VITALS
DIASTOLIC BLOOD PRESSURE: 68 MMHG | HEIGHT: 70 IN | HEART RATE: 90 BPM | OXYGEN SATURATION: 96 % | SYSTOLIC BLOOD PRESSURE: 112 MMHG | BODY MASS INDEX: 30.38 KG/M2 | WEIGHT: 212.2 LBS

## 2024-08-23 DIAGNOSIS — M77.8 RIGHT WRIST TENDINITIS: Primary | ICD-10-CM

## 2024-08-23 DIAGNOSIS — I10 HTN (HYPERTENSION), BENIGN: ICD-10-CM

## 2024-08-23 DIAGNOSIS — E11.9 TYPE 2 DIABETES MELLITUS WITHOUT COMPLICATION, WITHOUT LONG-TERM CURRENT USE OF INSULIN (HCC): ICD-10-CM

## 2024-08-23 RX ORDER — PREDNISONE 20 MG/1
20 TABLET ORAL 2 TIMES DAILY
Qty: 10 TABLET | Refills: 0 | Status: SHIPPED | OUTPATIENT
Start: 2024-08-23 | End: 2024-08-28

## 2024-08-23 RX ORDER — METHYLPREDNISOLONE ACETATE 40 MG/ML
40 INJECTION, SUSPENSION INTRA-ARTICULAR; INTRALESIONAL; INTRAMUSCULAR; SOFT TISSUE ONCE
Status: COMPLETED | OUTPATIENT
Start: 2024-08-23 | End: 2024-08-23

## 2024-08-23 RX ADMIN — METHYLPREDNISOLONE ACETATE 40 MG: 40 INJECTION, SUSPENSION INTRA-ARTICULAR; INTRALESIONAL; INTRAMUSCULAR; SOFT TISSUE at 12:40

## 2024-08-23 ASSESSMENT — ENCOUNTER SYMPTOMS
SINUS PAIN: 0
COUGH: 0
COLOR CHANGE: 0
CONSTIPATION: 0
WHEEZING: 0
BLOOD IN STOOL: 0
CHEST TIGHTNESS: 0
ABDOMINAL PAIN: 0
SHORTNESS OF BREATH: 0

## 2024-08-23 NOTE — PROGRESS NOTES
Demetrio Teran (:  1940) is a 84 y.o. male,Established patient, here for evaluation of the following chief complaint(s):  Injections      Assessment & Plan   ASSESSMENT/PLAN:  1. Right wrist tendinitis  -     predniSONE (DELTASONE) 20 MG tablet; Take 1 tablet by mouth 2 times daily for 5 days, Disp-10 tablet, R-0Normal  -     methylPREDNISolone acetate (DEPO-MEDROL) injection 40 mg; 40 mg, IntraMUSCular, ONCE, 1 dose, On 24 at 1200  -     INJECT TENDON SHEATH/LIGAMENT  2. Type 2 diabetes mellitus without complication, without long-term current use of insulin (HCC)  3. HTN (hypertension), benign    After obtaining verbal consent patient was given 4 mg Depo-Medrol and 2 the area of the right head of the ulnar stylus patient tolerated procedure well postinjection care instruction discussed with the patient exercise heat and anti-inflammatory will be utilized for the next 5 days    Patient diabetic control needs to be worked on patient has been advised to use lower caloric intake diet increase his fluid intake and monitor clinically    Patient blood pressure is well-controlled we will continue same for now  No follow-ups on file.         Subjective   SUBJECTIVE/OBJECTIVE:    Lab Review   Lab Results   Component Value Date/Time     2024 08:10 AM     2024 12:57 PM     2024 10:42 AM    K 3.3 2024 08:10 AM    K 3.5 2024 12:57 PM    K 3.4 2024 10:42 AM    K 3.5 2023 12:10 PM    K 3.8 2018 02:34 AM    CO2 30 2024 08:10 AM    CO2 28 2024 12:57 PM    CO2 27 2024 10:42 AM    BUN 22 2024 08:10 AM    BUN 13 2024 12:57 PM    BUN 13 2024 10:42 AM    CREATININE 1.3 2024 08:10 AM    CREATININE 1.0 2024 12:57 PM    CREATININE 1.4 2024 10:42 AM    GLUCOSE 184 2024 08:10 AM    GLUCOSE 181 2024 12:57 PM    GLUCOSE 437 2024 10:42 AM    CALCIUM 9.6 2024 08:10 AM    CALCIUM 9.2

## 2024-09-09 DIAGNOSIS — E11.9 TYPE 2 DIABETES MELLITUS WITHOUT COMPLICATION, WITHOUT LONG-TERM CURRENT USE OF INSULIN (HCC): ICD-10-CM

## 2024-09-09 RX ORDER — GABAPENTIN 100 MG/1
100 CAPSULE ORAL NIGHTLY
Qty: 30 CAPSULE | Refills: 0 | Status: SHIPPED | OUTPATIENT
Start: 2024-09-09 | End: 2024-10-09

## 2024-09-17 ENCOUNTER — TELEPHONE (OUTPATIENT)
Dept: INTERNAL MEDICINE CLINIC | Age: 84
End: 2024-09-17

## 2024-09-17 NOTE — TELEPHONE ENCOUNTER
Medical necessity for [facility] surgical appliances      [phone]  Fax [phone]    Diabetic shoes.

## 2024-09-30 ENCOUNTER — TELEPHONE (OUTPATIENT)
Dept: INTERNAL MEDICINE CLINIC | Age: 84
End: 2024-09-30

## 2024-09-30 DIAGNOSIS — E03.4 HYPOTHYROIDISM DUE TO ACQUIRED ATROPHY OF THYROID: ICD-10-CM

## 2024-09-30 RX ORDER — LEVOTHYROXINE SODIUM 100 UG/1
100 TABLET ORAL DAILY
Qty: 90 TABLET | Refills: 0 | Status: SHIPPED | OUTPATIENT
Start: 2024-09-30

## 2024-09-30 NOTE — TELEPHONE ENCOUNTER
Last OV: 8/23/2024  Next OV: 11/21/2024    Next appointment due:na    Last fill:2/21/24  Refills:5

## 2024-10-09 DIAGNOSIS — E11.9 TYPE 2 DIABETES MELLITUS WITHOUT COMPLICATION, WITHOUT LONG-TERM CURRENT USE OF INSULIN (HCC): ICD-10-CM

## 2024-10-09 RX ORDER — GABAPENTIN 100 MG/1
100 CAPSULE ORAL NIGHTLY
Qty: 90 CAPSULE | Refills: 1 | Status: SHIPPED | OUTPATIENT
Start: 2024-10-09 | End: 2025-04-09

## 2024-11-13 RX ORDER — MONTELUKAST SODIUM 10 MG/1
10 TABLET ORAL DAILY
Qty: 90 TABLET | Refills: 1 | Status: SHIPPED | OUTPATIENT
Start: 2024-11-13

## 2024-11-21 ENCOUNTER — OFFICE VISIT (OUTPATIENT)
Dept: INTERNAL MEDICINE CLINIC | Age: 84
End: 2024-11-21

## 2024-11-21 VITALS
OXYGEN SATURATION: 96 % | HEIGHT: 70 IN | SYSTOLIC BLOOD PRESSURE: 138 MMHG | BODY MASS INDEX: 30.21 KG/M2 | HEART RATE: 74 BPM | WEIGHT: 211 LBS | DIASTOLIC BLOOD PRESSURE: 62 MMHG

## 2024-11-21 DIAGNOSIS — E11.65 TYPE 2 DIABETES MELLITUS WITH HYPERGLYCEMIA, WITH LONG-TERM CURRENT USE OF INSULIN (HCC): ICD-10-CM

## 2024-11-21 DIAGNOSIS — E11.9 TYPE 2 DIABETES MELLITUS WITHOUT COMPLICATION, WITHOUT LONG-TERM CURRENT USE OF INSULIN (HCC): ICD-10-CM

## 2024-11-21 DIAGNOSIS — E78.5 DYSLIPIDEMIA: ICD-10-CM

## 2024-11-21 DIAGNOSIS — I10 HTN (HYPERTENSION), BENIGN: Primary | ICD-10-CM

## 2024-11-21 DIAGNOSIS — I10 HTN (HYPERTENSION), BENIGN: ICD-10-CM

## 2024-11-21 DIAGNOSIS — Z79.4 TYPE 2 DIABETES MELLITUS WITH HYPERGLYCEMIA, WITH LONG-TERM CURRENT USE OF INSULIN (HCC): ICD-10-CM

## 2024-11-21 DIAGNOSIS — E03.4 HYPOTHYROIDISM DUE TO ACQUIRED ATROPHY OF THYROID: ICD-10-CM

## 2024-11-21 LAB
ANION GAP SERPL CALCULATED.3IONS-SCNC: 8 MMOL/L (ref 3–16)
BUN SERPL-MCNC: 13 MG/DL (ref 7–20)
CALCIUM SERPL-MCNC: 9.2 MG/DL (ref 8.3–10.6)
CHLORIDE SERPL-SCNC: 104 MMOL/L (ref 99–110)
CO2 SERPL-SCNC: 28 MMOL/L (ref 21–32)
CREAT SERPL-MCNC: 1.1 MG/DL (ref 0.8–1.3)
CREAT UR-MCNC: 153 MG/DL (ref 39–259)
GFR SERPLBLD CREATININE-BSD FMLA CKD-EPI: 66 ML/MIN/{1.73_M2}
GLUCOSE SERPL-MCNC: 75 MG/DL (ref 70–99)
MICROALBUMIN UR DL<=1MG/L-MCNC: 1.22 MG/DL
MICROALBUMIN/CREAT UR: 8 MG/G (ref 0–30)
POTASSIUM SERPL-SCNC: 3.5 MMOL/L (ref 3.5–5.1)
SODIUM SERPL-SCNC: 140 MMOL/L (ref 136–145)

## 2024-11-21 RX ORDER — SPIRONOLACTONE 25 MG/1
25 TABLET ORAL DAILY
Qty: 90 TABLET | Refills: 1 | Status: SHIPPED | OUTPATIENT
Start: 2024-11-21

## 2024-11-21 RX ORDER — INSULIN LISPRO 100 [IU]/ML
INJECTION, SOLUTION INTRAVENOUS; SUBCUTANEOUS
Qty: 30 ML | Refills: 3 | Status: SHIPPED | OUTPATIENT
Start: 2024-11-21

## 2024-11-21 RX ORDER — LEVOTHYROXINE SODIUM 100 UG/1
100 TABLET ORAL DAILY
Qty: 90 TABLET | Refills: 0 | Status: SHIPPED | OUTPATIENT
Start: 2024-11-21

## 2024-11-21 RX ORDER — ROSUVASTATIN CALCIUM 10 MG/1
20 TABLET, COATED ORAL DAILY
Qty: 180 TABLET | Refills: 1 | Status: SHIPPED | OUTPATIENT
Start: 2024-11-21

## 2024-11-21 ASSESSMENT — ENCOUNTER SYMPTOMS
ABDOMINAL PAIN: 0
CHEST TIGHTNESS: 0
BLOOD IN STOOL: 0
SHORTNESS OF BREATH: 0
WHEEZING: 0
COUGH: 0
SINUS PAIN: 0
VISUAL CHANGE: 0
CONSTIPATION: 0
COLOR CHANGE: 0

## 2024-11-21 NOTE — PROGRESS NOTES
Height 5' 10\" 5' 10\" 5' 10\"   Body Mass Index 30.28 kg/m2 30.45 kg/m2 31.05 kg/m2       Hypertension  This is a chronic problem. The current episode started more than 1 year ago. The problem is unchanged. The problem is controlled. Pertinent negatives include no chest pain, headaches, palpitations or shortness of breath. Identifiable causes of hypertension include a thyroid problem.   Thyroid Problem  Presents for follow-up visit. Patient reports no cold intolerance, constipation, fatigue, heat intolerance, leg swelling, menstrual problem, palpitations, tremors, visual change or weight gain. His past medical history is significant for hyperlipidemia.   Hyperlipidemia  The current episode started more than 1 year ago. The problem is controlled. Pertinent negatives include no chest pain, myalgias or shortness of breath.       Review of Systems   Constitutional:  Negative for activity change, appetite change, fatigue, unexpected weight change and weight gain.   HENT:  Negative for congestion, ear pain and sinus pain.    Respiratory:  Negative for cough, chest tightness, shortness of breath and wheezing.    Cardiovascular:  Negative for chest pain and palpitations.   Gastrointestinal:  Negative for abdominal pain, blood in stool and constipation.   Endocrine: Negative for cold intolerance, heat intolerance and polyuria.   Genitourinary:  Negative for dysuria, frequency, menstrual problem and urgency.   Musculoskeletal:  Negative for arthralgias and myalgias.   Skin:  Negative for color change and rash.   Neurological:  Negative for tremors, weakness and headaches.   Hematological:  Negative for adenopathy. Does not bruise/bleed easily.   Psychiatric/Behavioral:  Negative for agitation, dysphoric mood and sleep disturbance.           Objective   Physical Exam  Vitals and nursing note reviewed.   Constitutional:       General: He is not in acute distress.     Appearance: Normal appearance.   HENT:      Head:

## 2024-11-22 ENCOUNTER — TELEPHONE (OUTPATIENT)
Dept: ADMINISTRATIVE | Age: 84
End: 2024-11-22

## 2024-11-22 DIAGNOSIS — E11.9 TYPE 2 DIABETES MELLITUS WITHOUT COMPLICATION, WITHOUT LONG-TERM CURRENT USE OF INSULIN (HCC): ICD-10-CM

## 2024-11-22 DIAGNOSIS — E78.5 DYSLIPIDEMIA: ICD-10-CM

## 2024-11-22 NOTE — TELEPHONE ENCOUNTER
Submitted PA for Rosuvastatin Calcium 10MG tablets    [facility] (Key: [id number]) STATUS: PENDING.    Follow up done daily; if no decision with in three days we will refax.  If another three days goes by with no decision will call the insurance for status.

## 2024-11-25 DIAGNOSIS — E11.9 TYPE 2 DIABETES MELLITUS WITHOUT COMPLICATION, WITHOUT LONG-TERM CURRENT USE OF INSULIN (HCC): ICD-10-CM

## 2024-11-25 DIAGNOSIS — E78.5 DYSLIPIDEMIA: ICD-10-CM

## 2024-11-25 RX ORDER — ROSUVASTATIN CALCIUM 20 MG/1
20 TABLET, COATED ORAL DAILY
Qty: 90 TABLET | Refills: 1 | Status: SHIPPED | OUTPATIENT
Start: 2024-11-25

## 2024-11-25 RX ORDER — ROSUVASTATIN CALCIUM 20 MG/1
20 TABLET, COATED ORAL DAILY
Qty: 90 TABLET | Refills: 1 | Status: SHIPPED | OUTPATIENT
Start: 2024-11-25 | End: 2024-11-25 | Stop reason: SDUPTHER

## 2024-11-25 NOTE — TELEPHONE ENCOUNTER
The medication was DENIED; DENIAL letter is uploaded to MEDIA.    Generic Denial:  Other; please see Denial Letter.           Note :  ROSUVASTATIN TAB 10MG (use as directed: 60 tablets per 30 days) is denied for not meeting the quantity limit requirement. Quantities at or below your plan's limit 30 tablets per 30 days will continue to process at the pharmacy for you. Coverage for the requested quantity requires the following:     **Please note: ROSUVASTATIN TAB 20MG is commercially available and will process at your pharmacy for up to 30 tablets per 30 days. Please consider switching to this commercially available dose.      If you want an APPEAL; please note in this encounter what new information you would like to APPEAL with.  Once complete route back to PA POOL.    If this requires a response please respond to the pool ( P MHCX PSC MEDICATION PRE-AUTH).      Thank you please advise patient.

## 2024-12-23 ENCOUNTER — TELEPHONE (OUTPATIENT)
Dept: INTERNAL MEDICINE CLINIC | Age: 84
End: 2024-12-23

## 2024-12-23 ENCOUNTER — TELEMEDICINE (OUTPATIENT)
Dept: INTERNAL MEDICINE CLINIC | Age: 84
End: 2024-12-23

## 2024-12-23 DIAGNOSIS — U07.1 COVID: Primary | ICD-10-CM

## 2024-12-23 ASSESSMENT — ENCOUNTER SYMPTOMS
ABDOMINAL PAIN: 0
SORE THROAT: 0
SWOLLEN GLANDS: 0
VISUAL CHANGE: 0

## 2024-12-23 NOTE — TELEPHONE ENCOUNTER
Patient's daughter called in and said patient tested positive for Covid today. Patient is experiencing runny nose and cough. Daughter stated that patient had valve replacement beginning of the year and is concerned. Patient is willing to do a VV. Please call daughter back at [phone].

## 2024-12-23 NOTE — PROGRESS NOTES
Demetrio Teran, was evaluated through a synchronous (real-time) audio-video encounter. The patient (or guardian if applicable) is aware that this is a billable service, which includes applicable co-pays. This Virtual Visit was conducted with patient's (and/or legal guardian's) consent. Patient identification was verified, and a caregiver was present when appropriate.   The patient was located at Home: [address]  [place] [id number]  Provider was located at Facility (Appt Dept): [address] [place] [address]  Confirm you are appropriately licensed, registered, or certified to deliver care in the state where the patient is located as indicated above. If you are not or unsure, please re-schedule the visit: Yes, I confirm.     Demetrio Teran (:  1940) is a Established patient, presenting virtually for evaluation of the following:      Below is the assessment and plan developed based on review of pertinent history, physical exam, labs, studies, and medications.     Assessment & Plan  COVID    At this point patient did test positive for COVID so magaly start him on medication his kidney function is within normal range he is advised to hold his anticoagulant as well as the statin and increase his fluid intake for the short-term he is to let me know if his symptomatology does not change significantly or improve    Orders:    nirmatrelvir/ritonavir 300/100 (PAXLOVID, 300/100,) 20 x 150 MG & 10 x 100MG TBPK; Take 3 tablets (two 150 mg nirmatrelvir and one 100 mg ritonavir tablets) by mouth every 12 hours for 5 days.      No follow-ups on file.       Subjective   Positive For Covid-19  This is a new problem. The current episode started in the past 7 days. Pertinent negatives include no abdominal pain, anorexia, arthralgias, rash, sore throat, swollen glands, urinary symptoms, vertigo or visual change.     Review of Systems   HENT:  Negative for sore throat.    Gastrointestinal:  Negative for

## 2025-03-26 DIAGNOSIS — E03.4 HYPOTHYROIDISM DUE TO ACQUIRED ATROPHY OF THYROID: ICD-10-CM

## 2025-03-26 RX ORDER — LEVOTHYROXINE SODIUM 100 UG/1
100 TABLET ORAL DAILY
Qty: 90 TABLET | Refills: 0 | Status: SHIPPED | OUTPATIENT
Start: 2025-03-26

## 2025-04-06 DIAGNOSIS — E11.9 TYPE 2 DIABETES MELLITUS WITHOUT COMPLICATION, WITHOUT LONG-TERM CURRENT USE OF INSULIN: ICD-10-CM

## 2025-04-07 RX ORDER — GABAPENTIN 100 MG/1
CAPSULE ORAL
Qty: 90 CAPSULE | Refills: 1 | Status: SHIPPED | OUTPATIENT
Start: 2025-04-07 | End: 2025-10-07

## 2025-04-07 NOTE — TELEPHONE ENCOUNTER
Received refill request for gabapentin (NEURONTIN) 100 MG capsule  from [facility] pharmacy.     Last OV: 11/21/24    Next OV: 05/21/25    Last Labs: 11/21/24    Last Filled: 10/09/24

## 2025-05-07 DIAGNOSIS — E11.9 TYPE 2 DIABETES MELLITUS WITHOUT COMPLICATION, WITHOUT LONG-TERM CURRENT USE OF INSULIN (HCC): ICD-10-CM

## 2025-05-07 DIAGNOSIS — I10 HTN (HYPERTENSION), BENIGN: ICD-10-CM

## 2025-05-07 DIAGNOSIS — E78.5 DYSLIPIDEMIA: ICD-10-CM

## 2025-05-07 LAB
ALBUMIN SERPL-MCNC: 3.9 G/DL (ref 3.4–5)
ALBUMIN/GLOB SERPL: 1.6 {RATIO} (ref 1.1–2.2)
ALP SERPL-CCNC: 70 U/L (ref 40–129)
ALT SERPL-CCNC: 21 U/L (ref 10–40)
ANION GAP SERPL CALCULATED.3IONS-SCNC: 10 MMOL/L (ref 3–16)
AST SERPL-CCNC: 32 U/L (ref 15–37)
BASOPHILS # BLD: 0.1 K/UL (ref 0–0.2)
BASOPHILS NFR BLD: 1.2 %
BILIRUB SERPL-MCNC: 1 MG/DL (ref 0–1)
BUN SERPL-MCNC: 17 MG/DL (ref 7–20)
CALCIUM SERPL-MCNC: 9.1 MG/DL (ref 8.3–10.6)
CHLORIDE SERPL-SCNC: 102 MMOL/L (ref 99–110)
CHOLEST SERPL-MCNC: 307 MG/DL (ref 0–199)
CO2 SERPL-SCNC: 27 MMOL/L (ref 21–32)
CREAT SERPL-MCNC: 1.2 MG/DL (ref 0.8–1.3)
DEPRECATED RDW RBC AUTO: 15.4 % (ref 12.4–15.4)
EOSINOPHIL # BLD: 0.3 K/UL (ref 0–0.6)
EOSINOPHIL NFR BLD: 5.4 %
GFR SERPLBLD CREATININE-BSD FMLA CKD-EPI: 60 ML/MIN/{1.73_M2}
GLUCOSE SERPL-MCNC: 174 MG/DL (ref 70–99)
HCT VFR BLD AUTO: 47.8 % (ref 40.5–52.5)
HDLC SERPL-MCNC: 63 MG/DL (ref 40–60)
HGB BLD-MCNC: 15.9 G/DL (ref 13.5–17.5)
LDLC SERPL CALC-MCNC: 211 MG/DL
LYMPHOCYTES # BLD: 2 K/UL (ref 1–5.1)
LYMPHOCYTES NFR BLD: 33.4 %
MCH RBC QN AUTO: 28.3 PG (ref 26–34)
MCHC RBC AUTO-ENTMCNC: 33.2 G/DL (ref 31–36)
MCV RBC AUTO: 85.4 FL (ref 80–100)
MONOCYTES # BLD: 0.4 K/UL (ref 0–1.3)
MONOCYTES NFR BLD: 7.3 %
NEUTROPHILS # BLD: 3.1 K/UL (ref 1.7–7.7)
NEUTROPHILS NFR BLD: 52.7 %
PLATELET # BLD AUTO: 159 K/UL (ref 135–450)
PMV BLD AUTO: 10 FL (ref 5–10.5)
POTASSIUM SERPL-SCNC: 4.2 MMOL/L (ref 3.5–5.1)
PROT SERPL-MCNC: 6.4 G/DL (ref 6.4–8.2)
RBC # BLD AUTO: 5.6 M/UL (ref 4.2–5.9)
SODIUM SERPL-SCNC: 139 MMOL/L (ref 136–145)
TRIGL SERPL-MCNC: 163 MG/DL (ref 0–150)
TSH SERPL DL<=0.005 MIU/L-ACNC: 54.7 UIU/ML (ref 0.27–4.2)
VLDLC SERPL CALC-MCNC: 33 MG/DL
WBC # BLD AUTO: 5.9 K/UL (ref 4–11)

## 2025-05-08 ENCOUNTER — RESULTS FOLLOW-UP (OUTPATIENT)
Dept: INTERNAL MEDICINE CLINIC | Age: 85
End: 2025-05-08

## 2025-05-08 LAB
EST. AVERAGE GLUCOSE BLD GHB EST-MCNC: 171.4 MG/DL
HBA1C MFR BLD: 7.6 %

## 2025-05-08 NOTE — RESULT ENCOUNTER NOTE
Please let the patient know that results showing significant changes in his cholesterol and thyroid please check with the patient make sure he is taking his medication I am concerned that he has not been taking anything recently and reemphasized on him the importance of following up in the next 10 days as scheduled

## 2025-05-12 RX ORDER — MONTELUKAST SODIUM 10 MG/1
10 TABLET ORAL DAILY
Qty: 90 TABLET | Refills: 1 | Status: SHIPPED | OUTPATIENT
Start: 2025-05-12

## 2025-05-21 ENCOUNTER — OFFICE VISIT (OUTPATIENT)
Dept: INTERNAL MEDICINE CLINIC | Age: 85
End: 2025-05-21
Payer: MEDICARE

## 2025-05-21 VITALS
HEIGHT: 70 IN | DIASTOLIC BLOOD PRESSURE: 82 MMHG | OXYGEN SATURATION: 98 % | HEART RATE: 71 BPM | WEIGHT: 204.8 LBS | BODY MASS INDEX: 29.32 KG/M2 | SYSTOLIC BLOOD PRESSURE: 124 MMHG

## 2025-05-21 DIAGNOSIS — I50.32 CHRONIC DIASTOLIC HEART FAILURE (HCC): ICD-10-CM

## 2025-05-21 DIAGNOSIS — E11.65 TYPE 2 DIABETES MELLITUS WITH HYPERGLYCEMIA, WITH LONG-TERM CURRENT USE OF INSULIN (HCC): ICD-10-CM

## 2025-05-21 DIAGNOSIS — E03.4 HYPOTHYROIDISM DUE TO ACQUIRED ATROPHY OF THYROID: ICD-10-CM

## 2025-05-21 DIAGNOSIS — N40.1 BENIGN PROSTATIC HYPERPLASIA WITH URINARY FREQUENCY: ICD-10-CM

## 2025-05-21 DIAGNOSIS — I10 HTN (HYPERTENSION), BENIGN: ICD-10-CM

## 2025-05-21 DIAGNOSIS — E11.9 TYPE 2 DIABETES MELLITUS WITHOUT COMPLICATION, WITHOUT LONG-TERM CURRENT USE OF INSULIN (HCC): ICD-10-CM

## 2025-05-21 DIAGNOSIS — Z00.00 MEDICARE ANNUAL WELLNESS VISIT, SUBSEQUENT: Primary | ICD-10-CM

## 2025-05-21 DIAGNOSIS — E78.5 DYSLIPIDEMIA: ICD-10-CM

## 2025-05-21 DIAGNOSIS — I65.23 STENOSIS OF BOTH INTERNAL CAROTID ARTERIES: ICD-10-CM

## 2025-05-21 DIAGNOSIS — Z79.4 TYPE 2 DIABETES MELLITUS WITH HYPERGLYCEMIA, WITH LONG-TERM CURRENT USE OF INSULIN (HCC): ICD-10-CM

## 2025-05-21 DIAGNOSIS — M54.50 ACUTE BILATERAL LOW BACK PAIN WITHOUT SCIATICA: ICD-10-CM

## 2025-05-21 DIAGNOSIS — R35.0 BENIGN PROSTATIC HYPERPLASIA WITH URINARY FREQUENCY: ICD-10-CM

## 2025-05-21 PROCEDURE — 1036F TOBACCO NON-USER: CPT | Performed by: INTERNAL MEDICINE

## 2025-05-21 PROCEDURE — 1160F RVW MEDS BY RX/DR IN RCRD: CPT | Performed by: INTERNAL MEDICINE

## 2025-05-21 PROCEDURE — G8417 CALC BMI ABV UP PARAM F/U: HCPCS | Performed by: INTERNAL MEDICINE

## 2025-05-21 PROCEDURE — 3074F SYST BP LT 130 MM HG: CPT | Performed by: INTERNAL MEDICINE

## 2025-05-21 PROCEDURE — 99214 OFFICE O/P EST MOD 30 MIN: CPT | Performed by: INTERNAL MEDICINE

## 2025-05-21 PROCEDURE — G8427 DOCREV CUR MEDS BY ELIG CLIN: HCPCS | Performed by: INTERNAL MEDICINE

## 2025-05-21 PROCEDURE — 3051F HG A1C>EQUAL 7.0%<8.0%: CPT | Performed by: INTERNAL MEDICINE

## 2025-05-21 PROCEDURE — 1123F ACP DISCUSS/DSCN MKR DOCD: CPT | Performed by: INTERNAL MEDICINE

## 2025-05-21 PROCEDURE — 1159F MED LIST DOCD IN RCRD: CPT | Performed by: INTERNAL MEDICINE

## 2025-05-21 PROCEDURE — G0439 PPPS, SUBSEQ VISIT: HCPCS | Performed by: INTERNAL MEDICINE

## 2025-05-21 PROCEDURE — 3079F DIAST BP 80-89 MM HG: CPT | Performed by: INTERNAL MEDICINE

## 2025-05-21 RX ORDER — PREDNISONE 20 MG/1
20 TABLET ORAL 2 TIMES DAILY
Qty: 10 TABLET | Refills: 0 | Status: SHIPPED | OUTPATIENT
Start: 2025-05-21 | End: 2025-05-26

## 2025-05-21 RX ORDER — TIZANIDINE 2 MG/1
2 TABLET ORAL NIGHTLY PRN
Qty: 30 TABLET | Refills: 0 | Status: SHIPPED | OUTPATIENT
Start: 2025-05-21

## 2025-05-21 RX ORDER — ROSUVASTATIN CALCIUM 20 MG/1
20 TABLET, COATED ORAL DAILY
Qty: 30 TABLET | Refills: 5 | Status: SHIPPED | OUTPATIENT
Start: 2025-05-21

## 2025-05-21 SDOH — ECONOMIC STABILITY: FOOD INSECURITY: WITHIN THE PAST 12 MONTHS, THE FOOD YOU BOUGHT JUST DIDN'T LAST AND YOU DIDN'T HAVE MONEY TO GET MORE.: NEVER TRUE

## 2025-05-21 SDOH — ECONOMIC STABILITY: FOOD INSECURITY: WITHIN THE PAST 12 MONTHS, YOU WORRIED THAT YOUR FOOD WOULD RUN OUT BEFORE YOU GOT MONEY TO BUY MORE.: NEVER TRUE

## 2025-05-21 ASSESSMENT — PATIENT HEALTH QUESTIONNAIRE - PHQ9
2. FEELING DOWN, DEPRESSED OR HOPELESS: NOT AT ALL
SUM OF ALL RESPONSES TO PHQ QUESTIONS 1-9: 0
1. LITTLE INTEREST OR PLEASURE IN DOING THINGS: NOT AT ALL
SUM OF ALL RESPONSES TO PHQ QUESTIONS 1-9: 0

## 2025-05-21 ASSESSMENT — LIFESTYLE VARIABLES
HOW MANY STANDARD DRINKS CONTAINING ALCOHOL DO YOU HAVE ON A TYPICAL DAY: PATIENT DOES NOT DRINK
HOW OFTEN DO YOU HAVE A DRINK CONTAINING ALCOHOL: NEVER
HOW MANY STANDARD DRINKS CONTAINING ALCOHOL DO YOU HAVE ON A TYPICAL DAY: PATIENT DOES NOT DRINK

## 2025-05-21 ASSESSMENT — ENCOUNTER SYMPTOMS
CHEST TIGHTNESS: 0
BLOOD IN STOOL: 0
SHORTNESS OF BREATH: 0
ABDOMINAL PAIN: 0
COUGH: 0
COLOR CHANGE: 0
WHEEZING: 0
CONSTIPATION: 0
SINUS PAIN: 0

## 2025-05-21 NOTE — PATIENT INSTRUCTIONS

## 2025-05-21 NOTE — PROGRESS NOTES
Demetrio Teran (:  1940) is a 84 y.o. male,Established patient, here for evaluation of the following chief complaint(s):  6 Month Follow-Up, Medicare AWV, Lower Back Pain, Fatigue (He says he is tired and his hip and lower back hurts ), and Hip Pain      Assessment & Plan   ASSESSMENT/PLAN:  1. Medicare annual wellness visit, subsequent  -     CBC with Auto Differential; Future  -     Basic Metabolic Panel; Future  2. Type 2 diabetes mellitus with hyperglycemia, with long-term current use of insulin ([facility])  -     Lipid Panel; Future  -     Hemoglobin A1C; Future  3. Stenosis of both internal carotid arteries  4. Hypothyroidism due to acquired atrophy of thyroid  -     TSH; Future  -     T4, Free; Future  5. HTN (hypertension), benign  6. Type 2 diabetes mellitus without complication, without long-term current use of insulin (HCC)  -     Hemoglobin A1C; Future  -     rosuvastatin (CRESTOR) 20 MG tablet; Take 1 tablet by mouth daily, Disp-30 tablet, R-5Normal  7. Chronic diastolic heart failure (HCC)  8. Benign prostatic hyperplasia with urinary frequency  9. Acute bilateral low back pain without sciatica  -     predniSONE (DELTASONE) 20 MG tablet; Take 1 tablet by mouth 2 times daily for 5 days, Disp-10 tablet, R-0Normal  -     tiZANidine (ZANAFLEX) 2 MG tablet; Take 1 tablet by mouth nightly as needed (muscle spasms), Disp-30 tablet, R-0Normal  10. Dyslipidemia  -     rosuvastatin (CRESTOR) 20 MG tablet; Take 1 tablet by mouth daily, Disp-30 tablet, R-5Normal    Assessment & Plan  1. Back pain.  - He reports experiencing back pain for the past 7 to 8 months, which has been gradually worsening.  - The pain does not radiate down his legs.  - A prescription for prednisone and a muscle relaxant will be sent to pharmacy for the next 5 days. He is advised to use a heating pad on his lower back three times a day for 1 hour each time.  - Additionally, a set of home exercises will be provided to help alleviate the

## 2025-06-04 ENCOUNTER — APPOINTMENT (OUTPATIENT)
Dept: GENERAL RADIOLOGY | Age: 85
DRG: 617 | End: 2025-06-04
Payer: MEDICARE

## 2025-06-04 ENCOUNTER — HOSPITAL ENCOUNTER (INPATIENT)
Age: 85
LOS: 8 days | Discharge: HOME | DRG: 617 | End: 2025-06-12
Attending: INTERNAL MEDICINE | Admitting: INTERNAL MEDICINE
Payer: MEDICARE

## 2025-06-04 DIAGNOSIS — M86.9 OSTEOMYELITIS, UNSPECIFIED SITE, UNSPECIFIED TYPE (HCC): ICD-10-CM

## 2025-06-04 DIAGNOSIS — L03.032 CELLULITIS OF THIRD TOE OF LEFT FOOT: ICD-10-CM

## 2025-06-04 DIAGNOSIS — M86.9 OSTEOMYELITIS OF THIRD TOE OF LEFT FOOT (HCC): ICD-10-CM

## 2025-06-04 DIAGNOSIS — Z95.2 HISTORY OF AORTIC VALVE REPLACEMENT: ICD-10-CM

## 2025-06-04 DIAGNOSIS — L08.9 DIABETIC FOOT INFECTION (HCC): Primary | ICD-10-CM

## 2025-06-04 DIAGNOSIS — N17.9 AKI (ACUTE KIDNEY INJURY): ICD-10-CM

## 2025-06-04 DIAGNOSIS — E11.628 DIABETIC FOOT INFECTION (HCC): Primary | ICD-10-CM

## 2025-06-04 LAB
ALBUMIN SERPL-MCNC: 3.7 G/DL (ref 3.4–5)
ALBUMIN/GLOB SERPL: 1 {RATIO} (ref 1.1–2.2)
ALP SERPL-CCNC: 96 U/L (ref 40–129)
ALT SERPL-CCNC: 27 U/L (ref 10–40)
ANION GAP SERPL CALCULATED.3IONS-SCNC: 14 MMOL/L (ref 3–16)
AST SERPL-CCNC: 26 U/L (ref 15–37)
BASE EXCESS BLDV CALC-SCNC: -1.7 MMOL/L (ref -3–3)
BASOPHILS # BLD: 0.1 K/UL (ref 0–0.2)
BASOPHILS NFR BLD: 0.7 %
BETA-HYDROXYBUTYRATE: 0.1 MMOL/L (ref 0–0.27)
BILIRUB SERPL-MCNC: 1.2 MG/DL (ref 0–1)
BUN SERPL-MCNC: 40 MG/DL (ref 7–20)
CALCIUM SERPL-MCNC: 9.2 MG/DL (ref 8.3–10.6)
CHLORIDE SERPL-SCNC: 95 MMOL/L (ref 99–110)
CO2 BLDV-SCNC: 49 MMOL/L
CO2 SERPL-SCNC: 20 MMOL/L (ref 21–32)
COHGB MFR BLDV: 1.3 % (ref 0–1.5)
CREAT SERPL-MCNC: 2.2 MG/DL (ref 0.8–1.3)
CRP SERPL-MCNC: 15.8 MG/L (ref 0–5.1)
DEPRECATED RDW RBC AUTO: 15 % (ref 12.4–15.4)
EOSINOPHIL # BLD: 0.1 K/UL (ref 0–0.6)
EOSINOPHIL NFR BLD: 0.9 %
ERYTHROCYTE [SEDIMENTATION RATE] IN BLOOD BY WESTERGREN METHOD: 44 MM/HR (ref 0–20)
GFR SERPLBLD CREATININE-BSD FMLA CKD-EPI: 29 ML/MIN/{1.73_M2}
GLUCOSE BLD-MCNC: 100 MG/DL (ref 70–99)
GLUCOSE SERPL-MCNC: 235 MG/DL (ref 70–99)
HCO3 BLDV-SCNC: 21 MMOL/L (ref 23–29)
HCT VFR BLD AUTO: 49 % (ref 40.5–52.5)
HGB BLD-MCNC: 16.6 G/DL (ref 13.5–17.5)
LACTATE BLDV-SCNC: 1.5 MMOL/L (ref 0.4–1.9)
LYMPHOCYTES # BLD: 1.7 K/UL (ref 1–5.1)
LYMPHOCYTES NFR BLD: 11.4 %
MCH RBC QN AUTO: 28.3 PG (ref 26–34)
MCHC RBC AUTO-ENTMCNC: 33.8 G/DL (ref 31–36)
MCV RBC AUTO: 83.7 FL (ref 80–100)
METHGB MFR BLDV: 0.6 %
MONOCYTES # BLD: 1 K/UL (ref 0–1.3)
MONOCYTES NFR BLD: 6.9 %
NEUTROPHILS # BLD: 11.6 K/UL (ref 1.7–7.7)
NEUTROPHILS NFR BLD: 80.1 %
O2 CT VFR BLDV CALC: 24 VOL %
O2 THERAPY: ABNORMAL
PCO2 BLDV: 30.5 MMHG (ref 40–50)
PERFORMED ON: ABNORMAL
PH BLDV: 7.45 [PH] (ref 7.35–7.45)
PLATELET # BLD AUTO: 208 K/UL (ref 135–450)
PMV BLD AUTO: 8.3 FL (ref 5–10.5)
PO2 BLDV: 197 MMHG (ref 25–40)
POTASSIUM SERPL-SCNC: 3.7 MMOL/L (ref 3.5–5.1)
PROCALCITONIN SERPL IA-MCNC: 0.14 NG/ML (ref 0–0.15)
PROT SERPL-MCNC: 7.5 G/DL (ref 6.4–8.2)
RBC # BLD AUTO: 5.85 M/UL (ref 4.2–5.9)
SAO2 % BLDV: 99 %
SODIUM SERPL-SCNC: 129 MMOL/L (ref 136–145)
SODIUM SERPL-SCNC: 132 MMOL/L (ref 136–145)
WBC # BLD AUTO: 14.5 K/UL (ref 4–11)

## 2025-06-04 PROCEDURE — 36415 COLL VENOUS BLD VENIPUNCTURE: CPT

## 2025-06-04 PROCEDURE — 87040 BLOOD CULTURE FOR BACTERIA: CPT

## 2025-06-04 PROCEDURE — 83605 ASSAY OF LACTIC ACID: CPT

## 2025-06-04 PROCEDURE — 73660 X-RAY EXAM OF TOE(S): CPT

## 2025-06-04 PROCEDURE — 2500000003 HC RX 250 WO HCPCS: Performed by: INTERNAL MEDICINE

## 2025-06-04 PROCEDURE — 2580000003 HC RX 258: Performed by: PHYSICIAN ASSISTANT

## 2025-06-04 PROCEDURE — 86140 C-REACTIVE PROTEIN: CPT

## 2025-06-04 PROCEDURE — 6360000002 HC RX W HCPCS: Performed by: PHYSICIAN ASSISTANT

## 2025-06-04 PROCEDURE — 6370000000 HC RX 637 (ALT 250 FOR IP): Performed by: INTERNAL MEDICINE

## 2025-06-04 PROCEDURE — 1200000000 HC SEMI PRIVATE

## 2025-06-04 PROCEDURE — 84145 PROCALCITONIN (PCT): CPT

## 2025-06-04 PROCEDURE — 84295 ASSAY OF SERUM SODIUM: CPT

## 2025-06-04 PROCEDURE — 82803 BLOOD GASES ANY COMBINATION: CPT

## 2025-06-04 PROCEDURE — 85025 COMPLETE CBC W/AUTO DIFF WBC: CPT

## 2025-06-04 PROCEDURE — 96374 THER/PROPH/DIAG INJ IV PUSH: CPT

## 2025-06-04 PROCEDURE — 80053 COMPREHEN METABOLIC PANEL: CPT

## 2025-06-04 PROCEDURE — 99285 EMERGENCY DEPT VISIT HI MDM: CPT

## 2025-06-04 PROCEDURE — 82010 KETONE BODYS QUAN: CPT

## 2025-06-04 PROCEDURE — 6360000002 HC RX W HCPCS: Performed by: INTERNAL MEDICINE

## 2025-06-04 PROCEDURE — 85652 RBC SED RATE AUTOMATED: CPT

## 2025-06-04 PROCEDURE — 2580000003 HC RX 258: Performed by: INTERNAL MEDICINE

## 2025-06-04 RX ORDER — ACETAMINOPHEN 325 MG/1
650 TABLET ORAL EVERY 6 HOURS PRN
Status: DISCONTINUED | OUTPATIENT
Start: 2025-06-04 | End: 2025-06-12 | Stop reason: HOSPADM

## 2025-06-04 RX ORDER — CIPROFLOXACIN 2 MG/ML
400 INJECTION, SOLUTION INTRAVENOUS EVERY 12 HOURS
Status: DISCONTINUED | OUTPATIENT
Start: 2025-06-05 | End: 2025-06-09

## 2025-06-04 RX ORDER — LEVOTHYROXINE SODIUM 100 UG/1
100 TABLET ORAL DAILY
Status: DISCONTINUED | OUTPATIENT
Start: 2025-06-04 | End: 2025-06-12 | Stop reason: HOSPADM

## 2025-06-04 RX ORDER — CIPROFLOXACIN 2 MG/ML
400 INJECTION, SOLUTION INTRAVENOUS ONCE
Status: COMPLETED | OUTPATIENT
Start: 2025-06-04 | End: 2025-06-04

## 2025-06-04 RX ORDER — PANTOPRAZOLE SODIUM 40 MG/1
40 TABLET, DELAYED RELEASE ORAL DAILY
Status: DISCONTINUED | OUTPATIENT
Start: 2025-06-04 | End: 2025-06-12 | Stop reason: HOSPADM

## 2025-06-04 RX ORDER — TICAGRELOR 90 MG/1
90 TABLET, FILM COATED ORAL 2 TIMES DAILY
Status: DISCONTINUED | OUTPATIENT
Start: 2025-06-04 | End: 2025-06-12 | Stop reason: HOSPADM

## 2025-06-04 RX ORDER — ROSUVASTATIN CALCIUM 20 MG/1
10 TABLET, COATED ORAL DAILY
Status: DISCONTINUED | OUTPATIENT
Start: 2025-06-04 | End: 2025-06-05

## 2025-06-04 RX ORDER — SODIUM CHLORIDE 0.9 % (FLUSH) 0.9 %
5-40 SYRINGE (ML) INJECTION PRN
Status: DISCONTINUED | OUTPATIENT
Start: 2025-06-04 | End: 2025-06-12 | Stop reason: HOSPADM

## 2025-06-04 RX ORDER — SPIRONOLACTONE 25 MG/1
25 TABLET ORAL DAILY
Status: DISCONTINUED | OUTPATIENT
Start: 2025-06-04 | End: 2025-06-04

## 2025-06-04 RX ORDER — SODIUM CHLORIDE 9 MG/ML
INJECTION, SOLUTION INTRAVENOUS PRN
Status: DISCONTINUED | OUTPATIENT
Start: 2025-06-04 | End: 2025-06-12 | Stop reason: HOSPADM

## 2025-06-04 RX ORDER — METOPROLOL TARTRATE 25 MG/1
12.5 TABLET, FILM COATED ORAL 2 TIMES DAILY
Status: DISCONTINUED | OUTPATIENT
Start: 2025-06-04 | End: 2025-06-04

## 2025-06-04 RX ORDER — INSULIN LISPRO 100 [IU]/ML
0-16 INJECTION, SOLUTION INTRAVENOUS; SUBCUTANEOUS
Status: DISCONTINUED | OUTPATIENT
Start: 2025-06-04 | End: 2025-06-05

## 2025-06-04 RX ORDER — SODIUM CHLORIDE 0.9 % (FLUSH) 0.9 %
5-40 SYRINGE (ML) INJECTION EVERY 12 HOURS SCHEDULED
Status: DISCONTINUED | OUTPATIENT
Start: 2025-06-04 | End: 2025-06-12 | Stop reason: HOSPADM

## 2025-06-04 RX ORDER — 0.9 % SODIUM CHLORIDE 0.9 %
500 INTRAVENOUS SOLUTION INTRAVENOUS ONCE
Status: COMPLETED | OUTPATIENT
Start: 2025-06-04 | End: 2025-06-04

## 2025-06-04 RX ORDER — SODIUM CHLORIDE 9 MG/ML
INJECTION, SOLUTION INTRAVENOUS CONTINUOUS
Status: DISCONTINUED | OUTPATIENT
Start: 2025-06-04 | End: 2025-06-07

## 2025-06-04 RX ORDER — DEXTROSE MONOHYDRATE 100 MG/ML
INJECTION, SOLUTION INTRAVENOUS CONTINUOUS PRN
Status: DISCONTINUED | OUTPATIENT
Start: 2025-06-04 | End: 2025-06-05 | Stop reason: SDUPTHER

## 2025-06-04 RX ORDER — ACETAMINOPHEN 650 MG/1
650 SUPPOSITORY RECTAL EVERY 6 HOURS PRN
Status: DISCONTINUED | OUTPATIENT
Start: 2025-06-04 | End: 2025-06-12 | Stop reason: HOSPADM

## 2025-06-04 RX ORDER — GLUCAGON 1 MG/ML
1 KIT INJECTION PRN
Status: DISCONTINUED | OUTPATIENT
Start: 2025-06-04 | End: 2025-06-05 | Stop reason: SDUPTHER

## 2025-06-04 RX ORDER — POLYETHYLENE GLYCOL 3350 17 G/17G
17 POWDER, FOR SOLUTION ORAL DAILY PRN
Status: DISCONTINUED | OUTPATIENT
Start: 2025-06-04 | End: 2025-06-12 | Stop reason: HOSPADM

## 2025-06-04 RX ADMIN — SODIUM CHLORIDE: 0.9 INJECTION, SOLUTION INTRAVENOUS at 21:41

## 2025-06-04 RX ADMIN — TICAGRELOR 90 MG: 90 TABLET ORAL at 23:48

## 2025-06-04 RX ADMIN — VANCOMYCIN HYDROCHLORIDE 2000 MG: 10 INJECTION, POWDER, LYOPHILIZED, FOR SOLUTION INTRAVENOUS at 19:15

## 2025-06-04 RX ADMIN — SODIUM CHLORIDE 500 ML: 0.9 INJECTION, SOLUTION INTRAVENOUS at 17:46

## 2025-06-04 RX ADMIN — Medication 5 ML: at 21:39

## 2025-06-04 RX ADMIN — CIPROFLOXACIN 400 MG: 400 INJECTION, SOLUTION INTRAVENOUS at 17:43

## 2025-06-04 RX ADMIN — APIXABAN 2.5 MG: 5 TABLET, FILM COATED ORAL at 23:48

## 2025-06-04 ASSESSMENT — PAIN DESCRIPTION - FREQUENCY: FREQUENCY: INTERMITTENT

## 2025-06-04 ASSESSMENT — PAIN - FUNCTIONAL ASSESSMENT: PAIN_FUNCTIONAL_ASSESSMENT: ACTIVITIES ARE NOT PREVENTED

## 2025-06-04 ASSESSMENT — PAIN SCALES - GENERAL
PAINLEVEL_OUTOF10: 10
PAINLEVEL_OUTOF10: 0

## 2025-06-04 ASSESSMENT — PAIN DESCRIPTION - PAIN TYPE: TYPE: CHRONIC PAIN

## 2025-06-04 ASSESSMENT — PAIN DESCRIPTION - LOCATION: LOCATION: HAND

## 2025-06-04 ASSESSMENT — PAIN DESCRIPTION - ONSET: ONSET: GRADUAL

## 2025-06-04 ASSESSMENT — PAIN DESCRIPTION - ORIENTATION: ORIENTATION: RIGHT;LEFT

## 2025-06-04 ASSESSMENT — PAIN DESCRIPTION - DESCRIPTORS: DESCRIPTORS: CRAMPING

## 2025-06-04 NOTE — ED NOTES
Patient Name: Demetrio Teran  : 1940 85 y.o.  MRN: [id number]  ED Room #: ED-0001     Chief complaint:   Chief Complaint   Patient presents with    Wound Infection     Pt via self from home, c/o middle left toe infection, saw podiatrist today, is diabetic      Hospital Problem/Diagnosis:   Hospital Problems           Last Modified POA    * (Principal) Osteomyelitis (HCC) 2025 Yes         O2 Flow Rate:    (if applicable)  Cardiac Rhythm:   (if applicable)  Active LDA's:   Peripheral IV 25 Left Forearm (Active)            How does patient ambulate? Stand by assist    2. How does patient take pills? Whole with Water    3. Is patient alert? Alert    4. Is patient oriented? To Person, To Place, To Time, To Situation, and Follows Commands    5.   Patient arrived from:  home  Facility Name: ___________________________________________    6. If patient is disoriented or from a Skill Nursing Facility has family been notified of admission?  N/A    7. Patient belongings? Belongings: Cell Phone, Hearing Aids, Dentures, and Clothing    Disposition of belongings? Kept with Patient     8. Any specific patient or family belongings/needs/dynamics?   a. Patient has 2 insulin pumps on.     9. Miscellaneous comments/pending orders?  a. All ED orders complete.      If there are any additional questions please reach out to the Emergency Department.

## 2025-06-04 NOTE — ACP (ADVANCE CARE PLANNING)
Advanced Care Planning Note.    Purpose of Encounter: Advanced care planning in light of hospitalization  Parties In Attendance: Patient,    Decisional Capacity: Yes  Subjective: Patient  understand that this conversation is to address long term care goal  Objective: Patient  to the hospital with diabetic foot ulcer concerning for OM  Goals of Care Determination: Patient would pursue CPR and Intubation if required  Code Status: full code  Time spent on Advanced care Plannin minutes  Advanced Care Planning Documents: documented patient's wishes, would like Jaylyn Wyatt  to make medical decisions if unable to make decisions    Pasha Galvin MD  2025 6:01 PM

## 2025-06-04 NOTE — ED PROVIDER NOTES
06/04/2025 05:31:19 PM   DISPOSITION CONDITION Stable           PATIENT REFERRED TO:  No follow-up provider specified.    DISCHARGE MEDICATIONS:  New Prescriptions    No medications on file       DISCONTINUED MEDICATIONS:  Discontinued Medications    No medications on file              (Please note that portions of this note were completed with a voice recognition program.  Efforts were made to edit the dictations but occasionally words are mis-transcribed.)    Dorita Quigley PA-C (electronically signed)        Dorita Quigley PA-C  06/04/25 [id number]

## 2025-06-04 NOTE — H&P
tablet Take 1 tablet by mouth daily      potassium chloride (MICRO-K) 10 MEQ extended release capsule Take 1 capsule by mouth 2 times daily      ibuprofen (ADVIL;MOTRIN) 200 MG tablet Take 1 tablet by mouth every 6 hours as needed for Pain 600 mg at hs general stiffness and aches and pains.      Insulin Disposable Pump (V-GO 40) KIT 40 Units by Does not apply route daily 30 kit 1    aspirin 81 MG tablet Take 1 tablet by mouth daily      vitamin D (ERGOCALCIFEROL) 63598 UNITS CAPS capsule Take 1 capsule by mouth every 30 days      BD INSULIN SYRINGE ULTRAFINE 31G X 15/64\" 0.3 ML MISC          Allergies:  Allergies   Allergen Reactions    Pcn [Penicillins] Anaphylaxis and Swelling    Quinine Other (See Comments) and Swelling     Body sores.  Body sores.mouth throat swelling    Other/Food Other (See Comments)     Elevated glucose,     Quinidine     Quinine Derivatives     Prednisone Swelling     Elevated sugar  \"Made Glucose go irene high\"  \"Makes sugar go really high\"  Elevated sugar  \"Made Glucose go irene high\"  Makes blood sugar too high          Social History:  Patient Lives at [facility]   reports that he quit smoking about 39 years ago. His smoking use included cigarettes. He has never used smokeless tobacco. He reports that he does not drink alcohol and does not use drugs.     Family History:  family history includes Heart Disease in his maternal grandfather, maternal grandmother, paternal grandfather, and paternal grandmother. ,     Physical Exam:  BP (!) 153/86   Pulse 76   Temp 96.8 °F (36 °C) (Oral)   Resp 17   Wt 91.2 kg (201 lb)   SpO2 97%   BMI 28.84 kg/m²     General appearance:  Appears comfortable. AAOx3  HEENT: atraumatic, Pupils equal, muscous membranes moist, no masses appreciated  Cardiovascular: Regular rate and rhythm no murmurs appreciated  Respiratory: CTAB no wheezing  Gastrointestinal: Abdomen soft, non-tender, BS+  EXT: no edema  Neurology: no gross focal deficts  Psychiatry: Appropriate

## 2025-06-04 NOTE — ED NOTES
Called Dr. Kole Clark @ [phone] per Madiha AVENDANO, & Dr. Galvin's, consult request thru [employer]. Dr. Clark' pager went to voicemail. Left a voicemail for Dr. Kole Clark to see the patient in the inpatient side per Dr. Galvin's consult request. Stated in voicemail to call back @ [phone] to confirm he received the message. Informed SHARI Cleary RN.

## 2025-06-05 PROBLEM — N17.9 AKI (ACUTE KIDNEY INJURY): Status: ACTIVE | Noted: 2025-06-05

## 2025-06-05 PROBLEM — E11.628 DIABETIC FOOT INFECTION (HCC): Status: ACTIVE | Noted: 2025-06-05

## 2025-06-05 PROBLEM — D72.825 BANDEMIA: Status: ACTIVE | Noted: 2025-06-05

## 2025-06-05 PROBLEM — E87.1 HYPONATREMIA: Status: ACTIVE | Noted: 2025-06-05

## 2025-06-05 PROBLEM — E66.3 OVERWEIGHT (BMI 25.0-29.9): Status: ACTIVE | Noted: 2025-06-05

## 2025-06-05 PROBLEM — L08.9 DIABETIC FOOT INFECTION (HCC): Status: ACTIVE | Noted: 2025-06-05

## 2025-06-05 PROBLEM — L03.032 CELLULITIS OF THIRD TOE OF LEFT FOOT: Status: ACTIVE | Noted: 2025-06-05

## 2025-06-05 PROBLEM — E11.42 DIABETIC POLYNEUROPATHY ASSOCIATED WITH TYPE 2 DIABETES MELLITUS (HCC): Status: ACTIVE | Noted: 2025-06-05

## 2025-06-05 LAB
ALBUMIN SERPL-MCNC: 3.1 G/DL (ref 3.4–5)
ANION GAP SERPL CALCULATED.3IONS-SCNC: 10 MMOL/L (ref 3–16)
BACTERIA URNS QL MICRO: NORMAL /HPF
BASOPHILS # BLD: 0.1 K/UL (ref 0–0.2)
BASOPHILS NFR BLD: 0.5 %
BILIRUB UR QL STRIP.AUTO: NEGATIVE
BUN SERPL-MCNC: 34 MG/DL (ref 7–20)
CALCIUM SERPL-MCNC: 8.3 MG/DL (ref 8.3–10.6)
CHLORIDE SERPL-SCNC: 100 MMOL/L (ref 99–110)
CK SERPL-CCNC: 92 U/L (ref 39–308)
CLARITY UR: CLEAR
CO2 SERPL-SCNC: 22 MMOL/L (ref 21–32)
COLOR UR: YELLOW
CREAT SERPL-MCNC: 1.8 MG/DL (ref 0.8–1.3)
DEPRECATED RDW RBC AUTO: 15.1 % (ref 12.4–15.4)
EOSINOPHIL # BLD: 0.2 K/UL (ref 0–0.6)
EOSINOPHIL NFR BLD: 1.4 %
EPI CELLS #/AREA URNS AUTO: 0 /HPF (ref 0–5)
GFR SERPLBLD CREATININE-BSD FMLA CKD-EPI: 37 ML/MIN/{1.73_M2}
GLUCOSE BLD-MCNC: 198 MG/DL (ref 70–99)
GLUCOSE BLD-MCNC: 214 MG/DL (ref 70–99)
GLUCOSE BLD-MCNC: 233 MG/DL (ref 70–99)
GLUCOSE BLD-MCNC: 265 MG/DL (ref 70–99)
GLUCOSE BLD-MCNC: 282 MG/DL (ref 70–99)
GLUCOSE SERPL-MCNC: 242 MG/DL (ref 70–99)
GLUCOSE UR STRIP.AUTO-MCNC: NEGATIVE MG/DL
HCT VFR BLD AUTO: 45.5 % (ref 40.5–52.5)
HGB BLD-MCNC: 15.4 G/DL (ref 13.5–17.5)
HGB UR QL STRIP.AUTO: NEGATIVE
KETONES UR STRIP.AUTO-MCNC: NEGATIVE MG/DL
LEUKOCYTE ESTERASE UR QL STRIP.AUTO: NEGATIVE
LYMPHOCYTES # BLD: 0.8 K/UL (ref 1–5.1)
LYMPHOCYTES NFR BLD: 5 %
MAGNESIUM SERPL-MCNC: 2.16 MG/DL (ref 1.8–2.4)
MCH RBC QN AUTO: 28.3 PG (ref 26–34)
MCHC RBC AUTO-ENTMCNC: 33.8 G/DL (ref 31–36)
MCV RBC AUTO: 83.8 FL (ref 80–100)
MONOCYTES # BLD: 0.8 K/UL (ref 0–1.3)
MONOCYTES NFR BLD: 4.7 %
NEUTROPHILS # BLD: 14.5 K/UL (ref 1.7–7.7)
NEUTROPHILS NFR BLD: 88.4 %
NITRITE UR QL STRIP.AUTO: NEGATIVE
PERFORMED ON: ABNORMAL
PH UR STRIP.AUTO: 5.5 [PH] (ref 5–8)
PHOSPHATE SERPL-MCNC: 2.8 MG/DL (ref 2.5–4.9)
PLATELET # BLD AUTO: 176 K/UL (ref 135–450)
PMV BLD AUTO: 8.1 FL (ref 5–10.5)
POTASSIUM SERPL-SCNC: 3.8 MMOL/L (ref 3.5–5.1)
PROT UR STRIP.AUTO-MCNC: 30 MG/DL
RBC # BLD AUTO: 5.42 M/UL (ref 4.2–5.9)
RBC CLUMPS #/AREA URNS AUTO: 3 /HPF (ref 0–4)
SODIUM SERPL-SCNC: 132 MMOL/L (ref 136–145)
SP GR UR STRIP.AUTO: 1.01 (ref 1–1.03)
UA COMPLETE W REFLEX CULTURE PNL UR: ABNORMAL
UA DIPSTICK W REFLEX MICRO PNL UR: YES
URN SPEC COLLECT METH UR: ABNORMAL
UROBILINOGEN UR STRIP-ACNC: 0.2 E.U./DL
VANCOMYCIN SERPL-MCNC: 13.5 UG/ML
WBC # BLD AUTO: 16.4 K/UL (ref 4–11)
WBC #/AREA URNS AUTO: 0 /HPF (ref 0–5)

## 2025-06-05 PROCEDURE — 36415 COLL VENOUS BLD VENIPUNCTURE: CPT

## 2025-06-05 PROCEDURE — 97530 THERAPEUTIC ACTIVITIES: CPT

## 2025-06-05 PROCEDURE — 6370000000 HC RX 637 (ALT 250 FOR IP): Performed by: INTERNAL MEDICINE

## 2025-06-05 PROCEDURE — 1200000000 HC SEMI PRIVATE

## 2025-06-05 PROCEDURE — 2580000003 HC RX 258: Performed by: INTERNAL MEDICINE

## 2025-06-05 PROCEDURE — 97116 GAIT TRAINING THERAPY: CPT

## 2025-06-05 PROCEDURE — 85025 COMPLETE CBC W/AUTO DIFF WBC: CPT

## 2025-06-05 PROCEDURE — 2500000003 HC RX 250 WO HCPCS: Performed by: INTERNAL MEDICINE

## 2025-06-05 PROCEDURE — 6360000002 HC RX W HCPCS: Performed by: INTERNAL MEDICINE

## 2025-06-05 PROCEDURE — 80069 RENAL FUNCTION PANEL: CPT

## 2025-06-05 PROCEDURE — 81001 URINALYSIS AUTO W/SCOPE: CPT

## 2025-06-05 PROCEDURE — 99223 1ST HOSP IP/OBS HIGH 75: CPT | Performed by: INTERNAL MEDICINE

## 2025-06-05 PROCEDURE — 82550 ASSAY OF CK (CPK): CPT

## 2025-06-05 PROCEDURE — 83036 HEMOGLOBIN GLYCOSYLATED A1C: CPT

## 2025-06-05 PROCEDURE — 80202 ASSAY OF VANCOMYCIN: CPT

## 2025-06-05 PROCEDURE — 97165 OT EVAL LOW COMPLEX 30 MIN: CPT

## 2025-06-05 PROCEDURE — 97161 PT EVAL LOW COMPLEX 20 MIN: CPT

## 2025-06-05 PROCEDURE — 83735 ASSAY OF MAGNESIUM: CPT

## 2025-06-05 RX ORDER — GLUCAGON 1 MG/ML
1 KIT INJECTION PRN
Status: CANCELLED | OUTPATIENT
Start: 2025-06-05

## 2025-06-05 RX ORDER — GLUCAGON 1 MG/ML
1 KIT INJECTION PRN
Status: DISCONTINUED | OUTPATIENT
Start: 2025-06-05 | End: 2025-06-12 | Stop reason: HOSPADM

## 2025-06-05 RX ORDER — DEXTROSE MONOHYDRATE 100 MG/ML
INJECTION, SOLUTION INTRAVENOUS CONTINUOUS PRN
Status: CANCELLED | OUTPATIENT
Start: 2025-06-05

## 2025-06-05 RX ORDER — DEXTROSE MONOHYDRATE 100 MG/ML
INJECTION, SOLUTION INTRAVENOUS CONTINUOUS PRN
Status: DISCONTINUED | OUTPATIENT
Start: 2025-06-05 | End: 2025-06-12 | Stop reason: HOSPADM

## 2025-06-05 RX ADMIN — PANTOPRAZOLE SODIUM 40 MG: 40 TABLET, DELAYED RELEASE ORAL at 10:36

## 2025-06-05 RX ADMIN — LEVOTHYROXINE SODIUM 100 MCG: 0.1 TABLET ORAL at 12:47

## 2025-06-05 RX ADMIN — CIPROFLOXACIN 400 MG: 2 INJECTION, SOLUTION INTRAVENOUS at 06:34

## 2025-06-05 RX ADMIN — NAPHAZOLINE HYDROCHLORIDE AND PHENIRAMINE MALEATE 1 DROP: .25; 3 SOLUTION/ DROPS OPHTHALMIC at 21:02

## 2025-06-05 RX ADMIN — CIPROFLOXACIN 400 MG: 2 INJECTION, SOLUTION INTRAVENOUS at 21:07

## 2025-06-05 RX ADMIN — SODIUM CHLORIDE: 0.9 INJECTION, SOLUTION INTRAVENOUS at 21:06

## 2025-06-05 RX ADMIN — DAPTOMYCIN 480 MG: 500 INJECTION, POWDER, LYOPHILIZED, FOR SOLUTION INTRAVENOUS at 12:58

## 2025-06-05 RX ADMIN — TICAGRELOR 90 MG: 90 TABLET ORAL at 21:02

## 2025-06-05 RX ADMIN — SODIUM CHLORIDE: 0.9 INJECTION, SOLUTION INTRAVENOUS at 06:32

## 2025-06-05 RX ADMIN — NAPHAZOLINE HYDROCHLORIDE AND PHENIRAMINE MALEATE 1 DROP: .25; 3 SOLUTION/ DROPS OPHTHALMIC at 12:55

## 2025-06-05 RX ADMIN — Medication 10 ML: at 21:00

## 2025-06-05 ASSESSMENT — PAIN SCALES - GENERAL: PAINLEVEL_OUTOF10: 0

## 2025-06-06 ENCOUNTER — APPOINTMENT (OUTPATIENT)
Dept: GENERAL RADIOLOGY | Age: 85
DRG: 617 | End: 2025-06-06
Payer: MEDICARE

## 2025-06-06 PROBLEM — Z71.89 DIABETES EDUCATION, ENCOUNTER FOR: Status: ACTIVE | Noted: 2025-06-06

## 2025-06-06 LAB
ALBUMIN SERPL-MCNC: 2.9 G/DL (ref 3.4–5)
ANION GAP SERPL CALCULATED.3IONS-SCNC: 11 MMOL/L (ref 3–16)
BASOPHILS # BLD: 0.1 K/UL (ref 0–0.2)
BASOPHILS NFR BLD: 0.7 %
BUN SERPL-MCNC: 28 MG/DL (ref 7–20)
CALCIUM SERPL-MCNC: 8 MG/DL (ref 8.3–10.6)
CHLORIDE SERPL-SCNC: 102 MMOL/L (ref 99–110)
CO2 SERPL-SCNC: 21 MMOL/L (ref 21–32)
CREAT SERPL-MCNC: 1.6 MG/DL (ref 0.8–1.3)
DEPRECATED RDW RBC AUTO: 15.5 % (ref 12.4–15.4)
EOSINOPHIL # BLD: 0.3 K/UL (ref 0–0.6)
EOSINOPHIL NFR BLD: 3.3 %
EST. AVERAGE GLUCOSE BLD GHB EST-MCNC: 214.5 MG/DL
GFR SERPLBLD CREATININE-BSD FMLA CKD-EPI: 42 ML/MIN/{1.73_M2}
GLUCOSE BLD-MCNC: 145 MG/DL (ref 70–99)
GLUCOSE BLD-MCNC: 207 MG/DL (ref 70–99)
GLUCOSE BLD-MCNC: 295 MG/DL (ref 70–99)
GLUCOSE BLD-MCNC: 333 MG/DL (ref 70–99)
GLUCOSE BLD-MCNC: 76 MG/DL (ref 70–99)
GLUCOSE SERPL-MCNC: 125 MG/DL (ref 70–99)
HBA1C MFR BLD: 9.1 %
HCT VFR BLD AUTO: 41.8 % (ref 40.5–52.5)
HGB BLD-MCNC: 14.1 G/DL (ref 13.5–17.5)
LYMPHOCYTES # BLD: 1.1 K/UL (ref 1–5.1)
LYMPHOCYTES NFR BLD: 10.3 %
MCH RBC QN AUTO: 28.3 PG (ref 26–34)
MCHC RBC AUTO-ENTMCNC: 33.6 G/DL (ref 31–36)
MCV RBC AUTO: 84.1 FL (ref 80–100)
MONOCYTES # BLD: 0.7 K/UL (ref 0–1.3)
MONOCYTES NFR BLD: 6.9 %
NEUTROPHILS # BLD: 8.3 K/UL (ref 1.7–7.7)
NEUTROPHILS NFR BLD: 78.8 %
PERFORMED ON: ABNORMAL
PERFORMED ON: NORMAL
PHOSPHATE SERPL-MCNC: 2.7 MG/DL (ref 2.5–4.9)
PLATELET # BLD AUTO: 156 K/UL (ref 135–450)
PMV BLD AUTO: 8 FL (ref 5–10.5)
POTASSIUM SERPL-SCNC: 3.7 MMOL/L (ref 3.5–5.1)
RBC # BLD AUTO: 4.97 M/UL (ref 4.2–5.9)
SODIUM SERPL-SCNC: 134 MMOL/L (ref 136–145)
WBC # BLD AUTO: 10.5 K/UL (ref 4–11)

## 2025-06-06 PROCEDURE — 2500000003 HC RX 250 WO HCPCS: Performed by: INTERNAL MEDICINE

## 2025-06-06 PROCEDURE — 87070 CULTURE OTHR SPECIMN AEROBIC: CPT

## 2025-06-06 PROCEDURE — 85025 COMPLETE CBC W/AUTO DIFF WBC: CPT

## 2025-06-06 PROCEDURE — 87186 SC STD MICRODIL/AGAR DIL: CPT

## 2025-06-06 PROCEDURE — 1200000000 HC SEMI PRIVATE

## 2025-06-06 PROCEDURE — 36415 COLL VENOUS BLD VENIPUNCTURE: CPT

## 2025-06-06 PROCEDURE — 2580000003 HC RX 258: Performed by: INTERNAL MEDICINE

## 2025-06-06 PROCEDURE — 71046 X-RAY EXAM CHEST 2 VIEWS: CPT

## 2025-06-06 PROCEDURE — 6360000002 HC RX W HCPCS: Performed by: INTERNAL MEDICINE

## 2025-06-06 PROCEDURE — 87205 SMEAR GRAM STAIN: CPT

## 2025-06-06 PROCEDURE — 80069 RENAL FUNCTION PANEL: CPT

## 2025-06-06 PROCEDURE — 99233 SBSQ HOSP IP/OBS HIGH 50: CPT | Performed by: INTERNAL MEDICINE

## 2025-06-06 PROCEDURE — 6370000000 HC RX 637 (ALT 250 FOR IP): Performed by: INTERNAL MEDICINE

## 2025-06-06 RX ADMIN — PANTOPRAZOLE SODIUM 40 MG: 40 TABLET, DELAYED RELEASE ORAL at 10:58

## 2025-06-06 RX ADMIN — SODIUM CHLORIDE: 0.9 INJECTION, SOLUTION INTRAVENOUS at 21:28

## 2025-06-06 RX ADMIN — LEVOTHYROXINE SODIUM 100 MCG: 0.1 TABLET ORAL at 10:58

## 2025-06-06 RX ADMIN — Medication 10 ML: at 09:35

## 2025-06-06 RX ADMIN — DAPTOMYCIN 480 MG: 500 INJECTION, POWDER, LYOPHILIZED, FOR SOLUTION INTRAVENOUS at 11:07

## 2025-06-06 RX ADMIN — NAPHAZOLINE HYDROCHLORIDE AND PHENIRAMINE MALEATE 1 DROP: .25; 3 SOLUTION/ DROPS OPHTHALMIC at 21:24

## 2025-06-06 RX ADMIN — CIPROFLOXACIN 400 MG: 2 INJECTION, SOLUTION INTRAVENOUS at 06:16

## 2025-06-06 RX ADMIN — TICAGRELOR 90 MG: 90 TABLET ORAL at 21:24

## 2025-06-06 RX ADMIN — Medication 10 ML: at 21:25

## 2025-06-06 RX ADMIN — CIPROFLOXACIN 400 MG: 2 INJECTION, SOLUTION INTRAVENOUS at 18:41

## 2025-06-06 RX ADMIN — TICAGRELOR 90 MG: 90 TABLET ORAL at 11:54

## 2025-06-06 RX ADMIN — NAPHAZOLINE HYDROCHLORIDE AND PHENIRAMINE MALEATE 1 DROP: .25; 3 SOLUTION/ DROPS OPHTHALMIC at 09:34

## 2025-06-06 RX ADMIN — NAPHAZOLINE HYDROCHLORIDE AND PHENIRAMINE MALEATE 1 DROP: .25; 3 SOLUTION/ DROPS OPHTHALMIC at 15:02

## 2025-06-06 ASSESSMENT — PAIN SCALES - GENERAL: PAINLEVEL_OUTOF10: 0

## 2025-06-07 LAB
ALBUMIN SERPL-MCNC: 2.9 G/DL (ref 3.4–5)
ANION GAP SERPL CALCULATED.3IONS-SCNC: 10 MMOL/L (ref 3–16)
BASOPHILS # BLD: 0.1 K/UL (ref 0–0.2)
BASOPHILS NFR BLD: 0.6 %
BUN SERPL-MCNC: 23 MG/DL (ref 7–20)
CALCIUM SERPL-MCNC: 8.4 MG/DL (ref 8.3–10.6)
CHLORIDE SERPL-SCNC: 104 MMOL/L (ref 99–110)
CO2 SERPL-SCNC: 21 MMOL/L (ref 21–32)
CREAT SERPL-MCNC: 1.4 MG/DL (ref 0.8–1.3)
DEPRECATED RDW RBC AUTO: 15.2 % (ref 12.4–15.4)
EOSINOPHIL # BLD: 0.2 K/UL (ref 0–0.6)
EOSINOPHIL NFR BLD: 2.2 %
GFR SERPLBLD CREATININE-BSD FMLA CKD-EPI: 50 ML/MIN/{1.73_M2}
GLUCOSE BLD-MCNC: 153 MG/DL (ref 70–99)
GLUCOSE BLD-MCNC: 178 MG/DL (ref 70–99)
GLUCOSE BLD-MCNC: 186 MG/DL (ref 70–99)
GLUCOSE BLD-MCNC: 279 MG/DL (ref 70–99)
GLUCOSE SERPL-MCNC: 130 MG/DL (ref 70–99)
HCT VFR BLD AUTO: 41.2 % (ref 40.5–52.5)
HGB BLD-MCNC: 14.2 G/DL (ref 13.5–17.5)
LYMPHOCYTES # BLD: 1.3 K/UL (ref 1–5.1)
LYMPHOCYTES NFR BLD: 12.5 %
MAGNESIUM SERPL-MCNC: 2.1 MG/DL (ref 1.8–2.4)
MCH RBC QN AUTO: 28.7 PG (ref 26–34)
MCHC RBC AUTO-ENTMCNC: 34.5 G/DL (ref 31–36)
MCV RBC AUTO: 83.3 FL (ref 80–100)
MONOCYTES # BLD: 0.8 K/UL (ref 0–1.3)
MONOCYTES NFR BLD: 7.8 %
NEUTROPHILS # BLD: 8.3 K/UL (ref 1.7–7.7)
NEUTROPHILS NFR BLD: 76.9 %
PERFORMED ON: ABNORMAL
PHOSPHATE SERPL-MCNC: 3 MG/DL (ref 2.5–4.9)
PLATELET # BLD AUTO: 155 K/UL (ref 135–450)
PMV BLD AUTO: 7.9 FL (ref 5–10.5)
POTASSIUM SERPL-SCNC: 3.6 MMOL/L (ref 3.5–5.1)
RBC # BLD AUTO: 4.94 M/UL (ref 4.2–5.9)
SODIUM SERPL-SCNC: 135 MMOL/L (ref 136–145)
WBC # BLD AUTO: 10.8 K/UL (ref 4–11)

## 2025-06-07 PROCEDURE — 6370000000 HC RX 637 (ALT 250 FOR IP): Performed by: INTERNAL MEDICINE

## 2025-06-07 PROCEDURE — 6370000000 HC RX 637 (ALT 250 FOR IP)

## 2025-06-07 PROCEDURE — 2580000003 HC RX 258: Performed by: INTERNAL MEDICINE

## 2025-06-07 PROCEDURE — 36415 COLL VENOUS BLD VENIPUNCTURE: CPT

## 2025-06-07 PROCEDURE — 2500000003 HC RX 250 WO HCPCS: Performed by: INTERNAL MEDICINE

## 2025-06-07 PROCEDURE — 85025 COMPLETE CBC W/AUTO DIFF WBC: CPT

## 2025-06-07 PROCEDURE — 6360000002 HC RX W HCPCS: Performed by: INTERNAL MEDICINE

## 2025-06-07 PROCEDURE — 83735 ASSAY OF MAGNESIUM: CPT

## 2025-06-07 PROCEDURE — 80069 RENAL FUNCTION PANEL: CPT

## 2025-06-07 PROCEDURE — 1200000000 HC SEMI PRIVATE

## 2025-06-07 RX ORDER — INSULIN LISPRO 100 [IU]/ML
15 INJECTION, SOLUTION INTRAVENOUS; SUBCUTANEOUS ONCE
Status: COMPLETED | OUTPATIENT
Start: 2025-06-07 | End: 2025-06-07

## 2025-06-07 RX ORDER — INSULIN GLARGINE 100 [IU]/ML
20 INJECTION, SOLUTION SUBCUTANEOUS EVERY 12 HOURS
Status: DISCONTINUED | OUTPATIENT
Start: 2025-06-07 | End: 2025-06-07

## 2025-06-07 RX ORDER — INSULIN LISPRO 100 [IU]/ML
20 INJECTION, SOLUTION INTRAVENOUS; SUBCUTANEOUS
Status: DISCONTINUED | OUTPATIENT
Start: 2025-06-08 | End: 2025-06-08

## 2025-06-07 RX ORDER — INSULIN GLARGINE 100 [IU]/ML
45 INJECTION, SOLUTION SUBCUTANEOUS EVERY 12 HOURS
Status: DISCONTINUED | OUTPATIENT
Start: 2025-06-07 | End: 2025-06-08

## 2025-06-07 RX ORDER — INSULIN LISPRO 100 [IU]/ML
0-8 INJECTION, SOLUTION INTRAVENOUS; SUBCUTANEOUS
Status: DISCONTINUED | OUTPATIENT
Start: 2025-06-07 | End: 2025-06-12 | Stop reason: HOSPADM

## 2025-06-07 RX ADMIN — POLYVINYL ALCOHOL, POVIDONE 2 DROP: 14; 6 SOLUTION/ DROPS OPHTHALMIC at 00:03

## 2025-06-07 RX ADMIN — INSULIN LISPRO 15 UNITS: 100 INJECTION, SOLUTION INTRAVENOUS; SUBCUTANEOUS at 21:25

## 2025-06-07 RX ADMIN — Medication 10 ML: at 09:22

## 2025-06-07 RX ADMIN — ACETAMINOPHEN 650 MG: 325 TABLET ORAL at 22:29

## 2025-06-07 RX ADMIN — POLYVINYL ALCOHOL, POVIDONE 2 DROP: 14; 6 SOLUTION/ DROPS OPHTHALMIC at 09:29

## 2025-06-07 RX ADMIN — ACETAMINOPHEN 650 MG: 325 TABLET ORAL at 06:47

## 2025-06-07 RX ADMIN — CIPROFLOXACIN 400 MG: 2 INJECTION, SOLUTION INTRAVENOUS at 06:50

## 2025-06-07 RX ADMIN — POLYETHYLENE GLYCOL 3350 17 G: 17 POWDER, FOR SOLUTION ORAL at 13:33

## 2025-06-07 RX ADMIN — INSULIN GLARGINE 45 UNITS: 100 INJECTION, SOLUTION SUBCUTANEOUS at 21:25

## 2025-06-07 RX ADMIN — TICAGRELOR 90 MG: 90 TABLET ORAL at 09:21

## 2025-06-07 RX ADMIN — LEVOTHYROXINE SODIUM 100 MCG: 0.1 TABLET ORAL at 12:08

## 2025-06-07 RX ADMIN — TICAGRELOR 90 MG: 90 TABLET ORAL at 21:24

## 2025-06-07 RX ADMIN — POLYVINYL ALCOHOL, POVIDONE 2 DROP: 14; 6 SOLUTION/ DROPS OPHTHALMIC at 17:31

## 2025-06-07 RX ADMIN — ACETAMINOPHEN 650 MG: 325 TABLET ORAL at 14:04

## 2025-06-07 RX ADMIN — CIPROFLOXACIN 400 MG: 2 INJECTION, SOLUTION INTRAVENOUS at 17:33

## 2025-06-07 RX ADMIN — POLYVINYL ALCOHOL, POVIDONE 2 DROP: 14; 6 SOLUTION/ DROPS OPHTHALMIC at 21:28

## 2025-06-07 RX ADMIN — PANTOPRAZOLE SODIUM 40 MG: 40 TABLET, DELAYED RELEASE ORAL at 09:21

## 2025-06-07 RX ADMIN — DAPTOMYCIN 480 MG: 500 INJECTION, POWDER, LYOPHILIZED, FOR SOLUTION INTRAVENOUS at 12:08

## 2025-06-07 RX ADMIN — POLYVINYL ALCOHOL, POVIDONE 2 DROP: 14; 6 SOLUTION/ DROPS OPHTHALMIC at 12:09

## 2025-06-07 RX ADMIN — INSULIN LISPRO 4 UNITS: 100 INJECTION, SOLUTION INTRAVENOUS; SUBCUTANEOUS at 21:25

## 2025-06-07 RX ADMIN — NAPHAZOLINE HYDROCHLORIDE AND PHENIRAMINE MALEATE 1 DROP: .25; 3 SOLUTION/ DROPS OPHTHALMIC at 09:21

## 2025-06-07 ASSESSMENT — PAIN DESCRIPTION - LOCATION
LOCATION: FOOT
LOCATION: BACK

## 2025-06-07 ASSESSMENT — PAIN DESCRIPTION - DESCRIPTORS: DESCRIPTORS: ACHING;DISCOMFORT

## 2025-06-07 ASSESSMENT — PAIN SCALES - GENERAL
PAINLEVEL_OUTOF10: 2
PAINLEVEL_OUTOF10: 5
PAINLEVEL_OUTOF10: 3
PAINLEVEL_OUTOF10: 0

## 2025-06-07 ASSESSMENT — PAIN - FUNCTIONAL ASSESSMENT: PAIN_FUNCTIONAL_ASSESSMENT: ACTIVITIES ARE NOT PREVENTED

## 2025-06-07 ASSESSMENT — PAIN DESCRIPTION - ORIENTATION: ORIENTATION: LEFT

## 2025-06-07 NOTE — CONSULTS
Clinical Pharmacy Note: Pharmacy to Dose Vancomycin    Demetrio Teran is a 85 y.o. male started on vancomycin for diabetic foot infection with possible osteomyelitis; consult received from Dr. Galvin to manage therapy. Also receiving the following antibiotics: ciprofloxacin.    Goal AUC: 400 - 600 mg/L*hr  Goal Trough Level: 15 - 20 mcg/mL    Assessment/Plan:  A 2000 mg loading dose was given on 06/04 at 1915.  Renal function is impaired (sCr 2.2; baseline ~1.1).  Will initiate vancomycin intermittent/pulse dosing given renal dysfunction.   A vancomycin random level has been ordered for 06/05 at 0600.  Changes in regimen will be determined based on culture results, renal function, and clinical response.  Pharmacy will continue to monitor and adjust regimen as necessary.    Allergies:  Pcn [penicillins], Quinine, Other/food, Quinidine, Quinine derivatives, and Prednisone     Recent Labs     06/04/25  1623   CREATININE 2.2*       Recent Labs     06/04/25  1623   WBC 14.5*       Ht Readings from Last 1 Encounters:   05/21/25 1.778 m (5' 10\")        Wt Readings from Last 1 Encounters:   06/04/25 91.2 kg (201 lb)         Estimated Creatinine Clearance: 28 mL/min (A) (based on SCr of 2.2 mg/dL (H)).      Thank you for the consult,    Henrietta Virk, VikasD, BCPS     
  MD Julio César Hankins MD Aldo Estella,               Office: [phone]                      Fax: [phone]               payever                     Nephrology consult received. Full consult report will follow. Chart reviwed.     Stop Aldactone  Hold home - Ibuprofen, avoid NSAIDs     cc/hr  Monitor for fluid overload a/ HEpEF hx.     Na goal <6-8 pints/24 hrs    Abx per 1' -ID-pharmacist team   ISS for BG control     Thank you for allowing us to participate in this patient's care. Please do not hesitate to contact us anytime. We will follow along with you.       Julio César Crow MD  [facility]   [phone] or [employer].    
Department of Podiatry Consult Note  Resident       Reason for Consult:  osteo  Requesting Physician:  Dr. Pasha Galvin MD    CHIEF COMPLAINT:  osteo    HISTORY OF PRESENT ILLNESS:                The patient is a 85 y.o. male with significant past medical history as listed below. Podiatry was consulted for wound on the left third digit. Patient states he is unsure how long he has had the wound. It has been monitored by Dr. Slade in the outpatient setting and was subsequently sent in for concerns for infection. The toe is extremely painful to him. Patient currently denies fever, chills, nausea, vomiting, shortness of breath, chest pain.  Patient has no other pedal complaints at this time.    Past Medical History:        Diagnosis Date    Arthritis     Cancer (HCC)     skin cancer    Cerebrovascular accident (CVA) (HCC) 09/16/2022    Chronic kidney disease     Diabetes mellitus (HCC)     GERD (gastroesophageal reflux disease)     Glaucoma     Hyperlipidemia     Hypertension     Irregular heart beat     Psychiatric problem     depression and anxiety    Reflux     Thyroid disease     Ulcerative colitis (HCC)     Vitamin D deficiency        Past Surgical History:        Procedure Laterality Date    CATARACT REMOVAL      CHOLECYSTECTOMY, LAPAROSCOPIC  05/23/2018    with cholangiogram    COLONOSCOPY      EYE SURGERY      cataracts Glaucoma surgery    HEMORRHOID SURGERY      JOINT REPLACEMENT  03/05/2013    left knee    KNEE ARTHROSCOPY  1973 2011    right    KNEE SURGERY Left [id number]    bone fragments    KNEE SURGERY Left 2011    meniscus    PROSTATE SURGERY      RETINAL LASER         Allergies:   Pcn [penicillins], Quinine, Other/food, Quinidine, Quinine derivatives, and Prednisone    Medications:   Home Meds  No current facility-administered medications on file prior to encounter.     Current Outpatient Medications on File Prior to Encounter   Medication Sig Dispense Refill    tiZANidine (ZANAFLEX) 2 MG tablet 
infection  Hypertension reasonably controlled  Post aortic valve replacement, mechanical, antiocagulated  Sensorineural hearing loss  Conjunctivitis, bilateral  Diabetic neuropathy  Acute renal failure stage 3    Plan:  Add 15 to 20 units Lantus every 12 hours to current V-Go 40 as blood sugar currently 295 mg/dl  Add sliding scale to meals, medium Humalog  Toe infection per hospitalists and pidiatry, looks like amputation may be neeed as inferior toe looks necrotic already.  Patient welcome to see me as outpatient as his endocrinologist has retired, karriekly will need a better insulin pump as too insulin resistant for V-go at this point.    GINA Clark M.D.      
06/04/25  1623 06/05/25  0449   CALCIUM 9.2 8.3   MG  --  2.16   PHOS  --  2.8     No results for input(s): \"PH\", \"PCO2\", \"PO2\" in the last 72 hours.    Invalid input(s): \"[id number]\", \"INSPIREDO2\"    ABG:  No results found for: \"PH\", \"PCO2\", \"PO2\", \"HCO3\", \"BE\", \"THGB\", \"TCO2\", \"O2SAT\"  VBG:    Lab Results   Component Value Date/Time    PHVEN 7.447 06/04/2025 04:24 PM    KOD7DTK 30.5 06/04/2025 04:24 PM    BEVEN -1.7 06/04/2025 04:24 PM    H8IFJORV 99 06/04/2025 04:24 PM       LDH:  No results found for: \"LDH\"  Uric Acid:    Lab Results   Component Value Date/Time    URICACID 7.5 11/16/2016 04:00 PM       PT/INR:    Lab Results   Component Value Date/Time    PROTIME 14.1 09/15/2022 02:30 PM    INR 1.10 09/15/2022 02:30 PM     Warfarin PT/INR:  No components found for: \"PTPATWAR\", \"PTINRWAR\"  PTT:    Lab Results   Component Value Date/Time    APTT 32.6 09/15/2022 02:30 PM   [APTT}  Last 3 Troponin:    Lab Results   Component Value Date/Time    TROPONINI 0.02 02/02/2023 03:22 PM    TROPONINI 0.02 02/02/2023 12:10 PM    TROPONINI 0.02 09/15/2022 02:30 PM       U/A:    Lab Results   Component Value Date/Time    NITRITE Negative 12/28/2023 04:01 PM    COLORU Yellow 06/05/2025 08:17 AM    PROTEINU 30 06/05/2025 08:17 AM    PHUR 5.5 06/05/2025 08:17 AM    PHUR 5.5 12/28/2023 04:01 PM    PHUR 5.0 05/10/2023 01:35 PM    WBCUA 0 06/05/2025 08:17 AM    RBCUA 3 06/05/2025 08:17 AM    RBCUA NEGATIVE 08/18/2014 08:20 AM    MUCUS PRESENT 08/18/2014 08:20 AM    BACTERIA None Seen 06/05/2025 08:17 AM    CLARITYU Clear 06/05/2025 08:17 AM    SPECGRAV >=1.030 12/28/2023 04:01 PM    LEUKOCYTESUR Negative 06/05/2025 08:17 AM    UROBILINOGEN 0.2 06/05/2025 08:17 AM    BILIRUBINUR Negative 06/05/2025 08:17 AM    BLOODU Negative 06/05/2025 08:17 AM    GLUCOSEU Negative 06/05/2025 08:17 AM     Microalbumen/Creatinine ratio:  No components found for: \"RUCREAT\"  24 Hour Urine for Protein:  No components found for: \"RAWUPRO\", \"UHRS3\", 
Coloration: Skin is not jaundiced.      Findings: No bruising, erythema or rash.      Comments: Left 3rd toe wound   Neurological:      General: No focal deficit present.      Mental Status: He is alert and oriented to person, place, and time. Mental status is at baseline.      Motor: No abnormal muscle tone.   Psychiatric:         Mood and Affect: Mood normal.         Behavior: Behavior normal.           Lines and drains: All vascular access sites are healthy with no local erythema, discharge or tenderness.            Intake and output:     I/O last 3 completed shifts:  In: -   Out: 900 [Urine:900]    Lab Data:   All available labs were reviewed by me today.     CBC:   Recent Labs     06/04/25  1623 06/05/25  0449   WBC 14.5* 16.4*   RBC 5.85 5.42   HGB 16.6 15.4   HCT 49.0 45.5    176   MCV 83.7 83.8   MCH 28.3 28.3   MCHC 33.8 33.8   RDW 15.0 15.1        BMP:  Recent Labs     06/04/25  1623 06/04/25  1921 06/05/25  0449   * 132* 132*   K 3.7  --  3.8   CL 95*  --  100   CO2 20*  --  22   BUN 40*  --  34*   CREATININE 2.2*  --  1.8*   CALCIUM 9.2  --  8.3   GLUCOSE 235*  --  242*        Hepatic FunctionPanel:   Lab Results   Component Value Date/Time    ALKPHOS 96 06/04/2025 04:23 PM    ALT 27 06/04/2025 04:23 PM    AST 26 06/04/2025 04:23 PM    BILITOT 1.2 06/04/2025 04:23 PM    BILIDIR <0.2 01/16/2015 04:35 PM    IBILI 0.8 01/16/2015 04:35 PM       CPK:   Lab Results   Component Value Date    CKTOTAL 92 06/05/2025     ESR:   Lab Results   Component Value Date    SEDRATE 44 (H) 06/04/2025     CRP:   Lab Results   Component Value Date    CRP 15.8 (H) 06/04/2025         Imaging:    All pertinent images and reports for the current visit were reviewed by me during this visit.  I reviewed the chest x-ray/CT scan/MRI images and independently interpreted the findings and results today.    XR TOE LEFT (MIN 2 VIEWS)   Final Result   1. Subtle cortical irregularity involving the 3rd distal tuft can be seen in

## 2025-06-08 LAB
ALBUMIN SERPL-MCNC: 3 G/DL (ref 3.4–5)
ANION GAP SERPL CALCULATED.3IONS-SCNC: 11 MMOL/L (ref 3–16)
BACTERIA BLD CULT ORG #2: NORMAL
BACTERIA BLD CULT: NORMAL
BASOPHILS # BLD: 0.1 K/UL (ref 0–0.2)
BASOPHILS NFR BLD: 1.2 %
BUN SERPL-MCNC: 22 MG/DL (ref 7–20)
CALCIUM SERPL-MCNC: 8.5 MG/DL (ref 8.3–10.6)
CHLORIDE SERPL-SCNC: 103 MMOL/L (ref 99–110)
CO2 SERPL-SCNC: 21 MMOL/L (ref 21–32)
CREAT SERPL-MCNC: 1.3 MG/DL (ref 0.8–1.3)
DEPRECATED RDW RBC AUTO: 15.3 % (ref 12.4–15.4)
EOSINOPHIL # BLD: 0.3 K/UL (ref 0–0.6)
EOSINOPHIL NFR BLD: 2.6 %
GFR SERPLBLD CREATININE-BSD FMLA CKD-EPI: 54 ML/MIN/{1.73_M2}
GLUCOSE BLD-MCNC: 156 MG/DL (ref 70–99)
GLUCOSE BLD-MCNC: 173 MG/DL (ref 70–99)
GLUCOSE BLD-MCNC: 184 MG/DL (ref 70–99)
GLUCOSE BLD-MCNC: 233 MG/DL (ref 70–99)
GLUCOSE BLD-MCNC: 95 MG/DL (ref 70–99)
GLUCOSE SERPL-MCNC: 145 MG/DL (ref 70–99)
HCT VFR BLD AUTO: 40.7 % (ref 40.5–52.5)
HGB BLD-MCNC: 14.1 G/DL (ref 13.5–17.5)
LYMPHOCYTES # BLD: 1.1 K/UL (ref 1–5.1)
LYMPHOCYTES NFR BLD: 10.5 %
MCH RBC QN AUTO: 28.9 PG (ref 26–34)
MCHC RBC AUTO-ENTMCNC: 34.7 G/DL (ref 31–36)
MCV RBC AUTO: 83.4 FL (ref 80–100)
MONOCYTES # BLD: 0.8 K/UL (ref 0–1.3)
MONOCYTES NFR BLD: 7.2 %
NEUTROPHILS # BLD: 8.2 K/UL (ref 1.7–7.7)
NEUTROPHILS NFR BLD: 78.5 %
PERFORMED ON: ABNORMAL
PERFORMED ON: NORMAL
PHOSPHATE SERPL-MCNC: 3.5 MG/DL (ref 2.5–4.9)
PLATELET # BLD AUTO: 156 K/UL (ref 135–450)
PMV BLD AUTO: 7.6 FL (ref 5–10.5)
POTASSIUM SERPL-SCNC: 3.8 MMOL/L (ref 3.5–5.1)
RBC # BLD AUTO: 4.88 M/UL (ref 4.2–5.9)
SODIUM SERPL-SCNC: 135 MMOL/L (ref 136–145)
T4 FREE SERPL-MCNC: 0.9 NG/DL (ref 0.9–1.8)
TSH SERPL DL<=0.005 MIU/L-ACNC: 5.08 UIU/ML (ref 0.27–4.2)
WBC # BLD AUTO: 10.4 K/UL (ref 4–11)

## 2025-06-08 PROCEDURE — 80069 RENAL FUNCTION PANEL: CPT

## 2025-06-08 PROCEDURE — 85025 COMPLETE CBC W/AUTO DIFF WBC: CPT

## 2025-06-08 PROCEDURE — 84443 ASSAY THYROID STIM HORMONE: CPT

## 2025-06-08 PROCEDURE — 1200000000 HC SEMI PRIVATE

## 2025-06-08 PROCEDURE — 6360000002 HC RX W HCPCS: Performed by: INTERNAL MEDICINE

## 2025-06-08 PROCEDURE — 84439 ASSAY OF FREE THYROXINE: CPT

## 2025-06-08 PROCEDURE — 6370000000 HC RX 637 (ALT 250 FOR IP): Performed by: INTERNAL MEDICINE

## 2025-06-08 PROCEDURE — 6370000000 HC RX 637 (ALT 250 FOR IP)

## 2025-06-08 PROCEDURE — 2580000003 HC RX 258: Performed by: INTERNAL MEDICINE

## 2025-06-08 PROCEDURE — 36415 COLL VENOUS BLD VENIPUNCTURE: CPT

## 2025-06-08 PROCEDURE — 2500000003 HC RX 250 WO HCPCS: Performed by: INTERNAL MEDICINE

## 2025-06-08 RX ORDER — INSULIN GLARGINE 100 [IU]/ML
42 INJECTION, SOLUTION SUBCUTANEOUS NIGHTLY
Status: DISCONTINUED | OUTPATIENT
Start: 2025-06-08 | End: 2025-06-10

## 2025-06-08 RX ORDER — INSULIN LISPRO 100 [IU]/ML
25 INJECTION, SOLUTION INTRAVENOUS; SUBCUTANEOUS
Status: DISCONTINUED | OUTPATIENT
Start: 2025-06-08 | End: 2025-06-10

## 2025-06-08 RX ORDER — INSULIN GLARGINE 100 [IU]/ML
24 INJECTION, SOLUTION SUBCUTANEOUS DAILY
Status: DISCONTINUED | OUTPATIENT
Start: 2025-06-09 | End: 2025-06-10

## 2025-06-08 RX ORDER — BACITRACIN ZINC AND POLYMYXIN B SULFATE 500; 10000 [USP'U]/G; [USP'U]/G
OINTMENT OPHTHALMIC 4 TIMES DAILY
Status: DISPENSED | OUTPATIENT
Start: 2025-06-08 | End: 2025-06-11

## 2025-06-08 RX ADMIN — ACETAMINOPHEN 650 MG: 325 TABLET ORAL at 04:59

## 2025-06-08 RX ADMIN — INSULIN LISPRO 25 UNITS: 100 INJECTION, SOLUTION INTRAVENOUS; SUBCUTANEOUS at 18:22

## 2025-06-08 RX ADMIN — POLYVINYL ALCOHOL, POVIDONE 2 DROP: 14; 6 SOLUTION/ DROPS OPHTHALMIC at 08:30

## 2025-06-08 RX ADMIN — POLYVINYL ALCOHOL, POVIDONE 2 DROP: 14; 6 SOLUTION/ DROPS OPHTHALMIC at 13:19

## 2025-06-08 RX ADMIN — BACITRACIN ZINC AND POLYMYXIN B SULFATE: 500; 10000 OINTMENT OPHTHALMIC at 18:22

## 2025-06-08 RX ADMIN — POLYVINYL ALCOHOL, POVIDONE 2 DROP: 14; 6 SOLUTION/ DROPS OPHTHALMIC at 20:42

## 2025-06-08 RX ADMIN — Medication 10 ML: at 20:44

## 2025-06-08 RX ADMIN — BACITRACIN ZINC AND POLYMYXIN B SULFATE: 500; 10000 OINTMENT OPHTHALMIC at 13:19

## 2025-06-08 RX ADMIN — INSULIN LISPRO 2 UNITS: 100 INJECTION, SOLUTION INTRAVENOUS; SUBCUTANEOUS at 20:42

## 2025-06-08 RX ADMIN — ACETAMINOPHEN 650 MG: 325 TABLET ORAL at 11:54

## 2025-06-08 RX ADMIN — PANTOPRAZOLE SODIUM 40 MG: 40 TABLET, DELAYED RELEASE ORAL at 09:36

## 2025-06-08 RX ADMIN — CIPROFLOXACIN 400 MG: 2 INJECTION, SOLUTION INTRAVENOUS at 18:30

## 2025-06-08 RX ADMIN — INSULIN LISPRO 20 UNITS: 100 INJECTION, SOLUTION INTRAVENOUS; SUBCUTANEOUS at 09:35

## 2025-06-08 RX ADMIN — Medication 10 ML: at 09:37

## 2025-06-08 RX ADMIN — INSULIN LISPRO 2 UNITS: 100 INJECTION, SOLUTION INTRAVENOUS; SUBCUTANEOUS at 09:36

## 2025-06-08 RX ADMIN — DAPTOMYCIN 480 MG: 500 INJECTION, POWDER, LYOPHILIZED, FOR SOLUTION INTRAVENOUS at 11:57

## 2025-06-08 RX ADMIN — LEVOTHYROXINE SODIUM 100 MCG: 0.1 TABLET ORAL at 11:54

## 2025-06-08 RX ADMIN — BACITRACIN ZINC AND POLYMYXIN B SULFATE: 500; 10000 OINTMENT OPHTHALMIC at 20:45

## 2025-06-08 RX ADMIN — POLYVINYL ALCOHOL, POVIDONE 2 DROP: 14; 6 SOLUTION/ DROPS OPHTHALMIC at 18:22

## 2025-06-08 RX ADMIN — INSULIN GLARGINE 45 UNITS: 100 INJECTION, SOLUTION SUBCUTANEOUS at 09:35

## 2025-06-08 RX ADMIN — CIPROFLOXACIN 400 MG: 2 INJECTION, SOLUTION INTRAVENOUS at 06:06

## 2025-06-08 RX ADMIN — INSULIN GLARGINE 42 UNITS: 100 INJECTION, SOLUTION SUBCUTANEOUS at 20:41

## 2025-06-08 RX ADMIN — INSULIN LISPRO 20 UNITS: 100 INJECTION, SOLUTION INTRAVENOUS; SUBCUTANEOUS at 13:19

## 2025-06-08 ASSESSMENT — PAIN SCALES - GENERAL
PAINLEVEL_OUTOF10: 7
PAINLEVEL_OUTOF10: 5
PAINLEVEL_OUTOF10: 3

## 2025-06-08 ASSESSMENT — PAIN DESCRIPTION - ONSET: ONSET: ON-GOING

## 2025-06-08 ASSESSMENT — PAIN - FUNCTIONAL ASSESSMENT
PAIN_FUNCTIONAL_ASSESSMENT: ACTIVITIES ARE NOT PREVENTED
PAIN_FUNCTIONAL_ASSESSMENT: ACTIVITIES ARE NOT PREVENTED

## 2025-06-08 ASSESSMENT — PAIN DESCRIPTION - LOCATION
LOCATION: BACK
LOCATION: HEAD;BACK
LOCATION: BACK

## 2025-06-08 ASSESSMENT — PAIN DESCRIPTION - ORIENTATION
ORIENTATION: RIGHT;LEFT;LOWER;MID
ORIENTATION: RIGHT;LEFT
ORIENTATION: MID;RIGHT;LEFT;ANTERIOR

## 2025-06-08 ASSESSMENT — PAIN DESCRIPTION - PAIN TYPE: TYPE: CHRONIC PAIN

## 2025-06-08 ASSESSMENT — PAIN DESCRIPTION - DESCRIPTORS
DESCRIPTORS: ACHING;DULL
DESCRIPTORS: ACHING

## 2025-06-09 ENCOUNTER — ANESTHESIA EVENT (OUTPATIENT)
Dept: OPERATING ROOM | Age: 85
DRG: 617 | End: 2025-06-09
Payer: MEDICARE

## 2025-06-09 ENCOUNTER — APPOINTMENT (OUTPATIENT)
Age: 85
DRG: 617 | End: 2025-06-09
Attending: INTERNAL MEDICINE
Payer: MEDICARE

## 2025-06-09 ENCOUNTER — ANESTHESIA (OUTPATIENT)
Dept: OPERATING ROOM | Age: 85
DRG: 617 | End: 2025-06-09
Payer: MEDICARE

## 2025-06-09 ENCOUNTER — APPOINTMENT (OUTPATIENT)
Dept: GENERAL RADIOLOGY | Age: 85
DRG: 617 | End: 2025-06-09
Payer: MEDICARE

## 2025-06-09 LAB
ALBUMIN SERPL-MCNC: 2.8 G/DL (ref 3.4–5)
ANION GAP SERPL CALCULATED.3IONS-SCNC: 11 MMOL/L (ref 3–16)
BACTERIA SPEC AEROBE CULT: ABNORMAL
BASOPHILS # BLD: 0.1 K/UL (ref 0–0.2)
BASOPHILS NFR BLD: 1.2 %
BUN SERPL-MCNC: 22 MG/DL (ref 7–20)
CALCIUM SERPL-MCNC: 8.4 MG/DL (ref 8.3–10.6)
CHLORIDE SERPL-SCNC: 105 MMOL/L (ref 99–110)
CO2 SERPL-SCNC: 21 MMOL/L (ref 21–32)
CREAT SERPL-MCNC: 1.3 MG/DL (ref 0.8–1.3)
DEPRECATED RDW RBC AUTO: 15.3 % (ref 12.4–15.4)
ECHO AO ASC DIAM: 3.6 CM
ECHO AO ASCENDING AORTA INDEX: 1.71 CM/M2
ECHO AV ACCELERATION TIME: 84 MS
ECHO AV AREA PEAK VELOCITY: 2.4 CM2
ECHO AV AREA VTI: 2.9 CM2
ECHO AV AREA/BSA PEAK VELOCITY: 1.1 CM2/M2
ECHO AV AREA/BSA VTI: 1.4 CM2/M2
ECHO AV MEAN GRADIENT: 11 MMHG
ECHO AV MEAN VELOCITY: 1.5 M/S
ECHO AV PEAK GRADIENT: 21 MMHG
ECHO AV PEAK VELOCITY: 2.3 M/S
ECHO AV VELOCITY RATIO: 0.43
ECHO AV VTI: 47.7 CM
ECHO BSA: 2.14 M2
ECHO LA AREA 2C: 16.8 CM2
ECHO LA AREA 4C: 16.2 CM2
ECHO LA DIAMETER INDEX: 1.86 CM/M2
ECHO LA DIAMETER: 3.9 CM
ECHO LA MAJOR AXIS: 5.5 CM
ECHO LA MINOR AXIS: 5.1 CM
ECHO LA VOL BP: 44 ML (ref 18–58)
ECHO LA VOL MOD A2C: 46 ML (ref 18–58)
ECHO LA VOL MOD A4C: 38 ML (ref 18–58)
ECHO LA VOL/BSA BIPLANE: 21 ML/M2 (ref 16–34)
ECHO LA VOLUME INDEX MOD A2C: 22 ML/M2 (ref 16–34)
ECHO LA VOLUME INDEX MOD A4C: 18 ML/M2 (ref 16–34)
ECHO LV E' LATERAL VELOCITY: 7.62 CM/S
ECHO LV E' SEPTAL VELOCITY: 7.29 CM/S
ECHO LV EDV A2C: 129 ML
ECHO LV EDV A4C: 130 ML
ECHO LV EDV INDEX A4C: 62 ML/M2
ECHO LV EDV NDEX A2C: 61 ML/M2
ECHO LV EF PHYSICIAN: 55 %
ECHO LV EJECTION FRACTION A2C: 52 %
ECHO LV EJECTION FRACTION A4C: 62 %
ECHO LV EJECTION FRACTION BIPLANE: 59 % (ref 55–100)
ECHO LV ESV A2C: 62 ML
ECHO LV ESV A4C: 49 ML
ECHO LV ESV INDEX A2C: 30 ML/M2
ECHO LV ESV INDEX A4C: 23 ML/M2
ECHO LV FRACTIONAL SHORTENING: 21 % (ref 28–44)
ECHO LV INTERNAL DIMENSION DIASTOLE INDEX: 1.38 CM/M2
ECHO LV INTERNAL DIMENSION DIASTOLIC: 2.9 CM (ref 4.2–5.9)
ECHO LV INTERNAL DIMENSION SYSTOLIC INDEX: 1.1 CM/M2
ECHO LV INTERNAL DIMENSION SYSTOLIC: 2.3 CM
ECHO LV IVSD: 1.4 CM (ref 0.6–1)
ECHO LV MASS 2D: 134.4 G (ref 88–224)
ECHO LV MASS INDEX 2D: 64 G/M2 (ref 49–115)
ECHO LV POSTERIOR WALL DIASTOLIC: 1.4 CM (ref 0.6–1)
ECHO LV RELATIVE WALL THICKNESS RATIO: 0.97
ECHO LVOT AREA: 5.3 CM2
ECHO LVOT AV VTI INDEX: 0.54
ECHO LVOT DIAM: 2.6 CM
ECHO LVOT MEAN GRADIENT: 2 MMHG
ECHO LVOT PEAK GRADIENT: 4 MMHG
ECHO LVOT PEAK VELOCITY: 1 M/S
ECHO LVOT STROKE VOLUME INDEX: 64.7 ML/M2
ECHO LVOT SV: 135.8 ML
ECHO LVOT VTI: 25.6 CM
ECHO MV A VELOCITY: 0.78 M/S
ECHO MV E VELOCITY: 0.98 M/S
ECHO MV E/A RATIO: 1.26
ECHO MV E/E' LATERAL: 12.86
ECHO MV E/E' RATIO (AVERAGED): 13.15
ECHO MV E/E' SEPTAL: 13.44
ECHO PV MAX VELOCITY: 1 M/S
ECHO PV PEAK GRADIENT: 4 MMHG
ECHO RA AREA 4C: 18.6 CM2
ECHO RA END SYSTOLIC VOLUME APICAL 4 CHAMBER INDEX BSA: 20 ML/M2
ECHO RA VOLUME: 41 ML
ECHO RV BASAL DIMENSION: 3.5 CM
ECHO RV FREE WALL PEAK S': 13.7 CM/S
ECHO RV LONGITUDINAL DIMENSION: 7.9 CM
ECHO RV MID DIMENSION: 2.5 CM
ECHO RV TAPSE: 1.9 CM (ref 1.7–?)
ECHO TV REGURGITANT MAX VELOCITY: 2.26 M/S
ECHO TV REGURGITANT PEAK GRADIENT: 20 MMHG
EOSINOPHIL # BLD: 0.2 K/UL (ref 0–0.6)
EOSINOPHIL NFR BLD: 2.3 %
GFR SERPLBLD CREATININE-BSD FMLA CKD-EPI: 54 ML/MIN/{1.73_M2}
GLUCOSE BLD-MCNC: 100 MG/DL (ref 70–99)
GLUCOSE BLD-MCNC: 102 MG/DL (ref 70–99)
GLUCOSE BLD-MCNC: 107 MG/DL (ref 70–99)
GLUCOSE BLD-MCNC: 185 MG/DL (ref 70–99)
GLUCOSE BLD-MCNC: 237 MG/DL (ref 70–99)
GLUCOSE BLD-MCNC: 88 MG/DL (ref 70–99)
GLUCOSE BLD-MCNC: 96 MG/DL (ref 70–99)
GLUCOSE SERPL-MCNC: 112 MG/DL (ref 70–99)
GRAM STN SPEC: ABNORMAL
HCT VFR BLD AUTO: 40.1 % (ref 40.5–52.5)
HGB BLD-MCNC: 13.6 G/DL (ref 13.5–17.5)
INR PPP: 1.06 (ref 0.85–1.15)
LYMPHOCYTES # BLD: 1.2 K/UL (ref 1–5.1)
LYMPHOCYTES NFR BLD: 13 %
MAGNESIUM SERPL-MCNC: 2.17 MG/DL (ref 1.8–2.4)
MCH RBC QN AUTO: 28.6 PG (ref 26–34)
MCHC RBC AUTO-ENTMCNC: 33.9 G/DL (ref 31–36)
MCV RBC AUTO: 84.4 FL (ref 80–100)
MONOCYTES # BLD: 0.6 K/UL (ref 0–1.3)
MONOCYTES NFR BLD: 6.3 %
NEUTROPHILS # BLD: 7.1 K/UL (ref 1.7–7.7)
NEUTROPHILS NFR BLD: 77.2 %
ORGANISM: ABNORMAL
PERFORMED ON: ABNORMAL
PERFORMED ON: NORMAL
PERFORMED ON: NORMAL
PHOSPHATE SERPL-MCNC: 3.6 MG/DL (ref 2.5–4.9)
PLATELET # BLD AUTO: 156 K/UL (ref 135–450)
PMV BLD AUTO: 7.4 FL (ref 5–10.5)
POTASSIUM SERPL-SCNC: 3.6 MMOL/L (ref 3.5–5.1)
PROTHROMBIN TIME: 14 SEC (ref 11.9–14.9)
RBC # BLD AUTO: 4.75 M/UL (ref 4.2–5.9)
SODIUM SERPL-SCNC: 137 MMOL/L (ref 136–145)
WBC # BLD AUTO: 9.2 K/UL (ref 4–11)

## 2025-06-09 PROCEDURE — 36415 COLL VENOUS BLD VENIPUNCTURE: CPT

## 2025-06-09 PROCEDURE — 6370000000 HC RX 637 (ALT 250 FOR IP): Performed by: INTERNAL MEDICINE

## 2025-06-09 PROCEDURE — 2580000003 HC RX 258: Performed by: PODIATRIST

## 2025-06-09 PROCEDURE — 7100000001 HC PACU RECOVERY - ADDTL 15 MIN: Performed by: PODIATRIST

## 2025-06-09 PROCEDURE — 85025 COMPLETE CBC W/AUTO DIFF WBC: CPT

## 2025-06-09 PROCEDURE — 2709999900 HC NON-CHARGEABLE SUPPLY: Performed by: PODIATRIST

## 2025-06-09 PROCEDURE — 6360000004 HC RX CONTRAST MEDICATION: Performed by: INTERNAL MEDICINE

## 2025-06-09 PROCEDURE — 88305 TISSUE EXAM BY PATHOLOGIST: CPT

## 2025-06-09 PROCEDURE — 93306 TTE W/DOPPLER COMPLETE: CPT | Performed by: INTERNAL MEDICINE

## 2025-06-09 PROCEDURE — 85610 PROTHROMBIN TIME: CPT

## 2025-06-09 PROCEDURE — 99232 SBSQ HOSP IP/OBS MODERATE 35: CPT | Performed by: INTERNAL MEDICINE

## 2025-06-09 PROCEDURE — 2500000003 HC RX 250 WO HCPCS: Performed by: PODIATRIST

## 2025-06-09 PROCEDURE — 2500000003 HC RX 250 WO HCPCS

## 2025-06-09 PROCEDURE — 80069 RENAL FUNCTION PANEL: CPT

## 2025-06-09 PROCEDURE — 83735 ASSAY OF MAGNESIUM: CPT

## 2025-06-09 PROCEDURE — 3700000000 HC ANESTHESIA ATTENDED CARE: Performed by: PODIATRIST

## 2025-06-09 PROCEDURE — 2500000003 HC RX 250 WO HCPCS: Performed by: INTERNAL MEDICINE

## 2025-06-09 PROCEDURE — C8929 TTE W OR WO FOL WCON,DOPPLER: HCPCS

## 2025-06-09 PROCEDURE — 6370000000 HC RX 637 (ALT 250 FOR IP): Performed by: PODIATRIST

## 2025-06-09 PROCEDURE — 3600000013 HC SURGERY LEVEL 3 ADDTL 15MIN: Performed by: PODIATRIST

## 2025-06-09 PROCEDURE — 1200000000 HC SEMI PRIVATE

## 2025-06-09 PROCEDURE — 0Y6U0Z0 DETACHMENT AT LEFT 3RD TOE, COMPLETE, OPEN APPROACH: ICD-10-PCS | Performed by: PODIATRIST

## 2025-06-09 PROCEDURE — 3700000001 HC ADD 15 MINUTES (ANESTHESIA): Performed by: PODIATRIST

## 2025-06-09 PROCEDURE — 7100000000 HC PACU RECOVERY - FIRST 15 MIN: Performed by: PODIATRIST

## 2025-06-09 PROCEDURE — 6370000000 HC RX 637 (ALT 250 FOR IP)

## 2025-06-09 PROCEDURE — 73630 X-RAY EXAM OF FOOT: CPT

## 2025-06-09 PROCEDURE — 6360000002 HC RX W HCPCS: Performed by: PODIATRIST

## 2025-06-09 PROCEDURE — 3600000003 HC SURGERY LEVEL 3 BASE: Performed by: PODIATRIST

## 2025-06-09 PROCEDURE — 6360000002 HC RX W HCPCS

## 2025-06-09 PROCEDURE — 88311 DECALCIFY TISSUE: CPT

## 2025-06-09 PROCEDURE — 6360000002 HC RX W HCPCS: Performed by: INTERNAL MEDICINE

## 2025-06-09 RX ORDER — LIDOCAINE HYDROCHLORIDE 10 MG/ML
INJECTION, SOLUTION EPIDURAL; INFILTRATION; INTRACAUDAL; PERINEURAL
Status: DISCONTINUED | OUTPATIENT
Start: 2025-06-09 | End: 2025-06-09 | Stop reason: ALTCHOICE

## 2025-06-09 RX ORDER — OXYCODONE AND ACETAMINOPHEN 5; 325 MG/1; MG/1
2 TABLET ORAL ONCE
Refills: 0 | Status: DISCONTINUED | OUTPATIENT
Start: 2025-06-09 | End: 2025-06-12 | Stop reason: HOSPADM

## 2025-06-09 RX ORDER — FENTANYL CITRATE 50 UG/ML
25 INJECTION, SOLUTION INTRAMUSCULAR; INTRAVENOUS EVERY 5 MIN PRN
Status: DISCONTINUED | OUTPATIENT
Start: 2025-06-09 | End: 2025-06-09 | Stop reason: HOSPADM

## 2025-06-09 RX ORDER — PROPOFOL 10 MG/ML
INJECTION, EMULSION INTRAVENOUS
Status: DISCONTINUED | OUTPATIENT
Start: 2025-06-09 | End: 2025-06-09 | Stop reason: SDUPTHER

## 2025-06-09 RX ORDER — SODIUM CHLORIDE 9 MG/ML
INJECTION, SOLUTION INTRAVENOUS PRN
Status: DISCONTINUED | OUTPATIENT
Start: 2025-06-09 | End: 2025-06-09 | Stop reason: HOSPADM

## 2025-06-09 RX ORDER — LIDOCAINE HYDROCHLORIDE 20 MG/ML
INJECTION, SOLUTION INFILTRATION; PERINEURAL
Status: DISCONTINUED | OUTPATIENT
Start: 2025-06-09 | End: 2025-06-09 | Stop reason: SDUPTHER

## 2025-06-09 RX ORDER — OXYCODONE HYDROCHLORIDE 5 MG/1
10 TABLET ORAL PRN
Status: DISCONTINUED | OUTPATIENT
Start: 2025-06-09 | End: 2025-06-09 | Stop reason: HOSPADM

## 2025-06-09 RX ORDER — OXYCODONE HYDROCHLORIDE 5 MG/1
5 TABLET ORAL PRN
Status: DISCONTINUED | OUTPATIENT
Start: 2025-06-09 | End: 2025-06-09 | Stop reason: HOSPADM

## 2025-06-09 RX ORDER — BUPIVACAINE HYDROCHLORIDE 5 MG/ML
INJECTION, SOLUTION EPIDURAL; INTRACAUDAL; PERINEURAL
Status: DISCONTINUED | OUTPATIENT
Start: 2025-06-09 | End: 2025-06-09 | Stop reason: ALTCHOICE

## 2025-06-09 RX ORDER — ONDANSETRON 2 MG/ML
INJECTION INTRAMUSCULAR; INTRAVENOUS
Status: DISCONTINUED | OUTPATIENT
Start: 2025-06-09 | End: 2025-06-09 | Stop reason: SDUPTHER

## 2025-06-09 RX ORDER — LABETALOL HYDROCHLORIDE 5 MG/ML
5 INJECTION, SOLUTION INTRAVENOUS EVERY 10 MIN PRN
Status: DISCONTINUED | OUTPATIENT
Start: 2025-06-09 | End: 2025-06-09 | Stop reason: HOSPADM

## 2025-06-09 RX ORDER — DIPHENHYDRAMINE HYDROCHLORIDE 50 MG/ML
12.5 INJECTION, SOLUTION INTRAMUSCULAR; INTRAVENOUS
Status: DISCONTINUED | OUTPATIENT
Start: 2025-06-09 | End: 2025-06-09 | Stop reason: HOSPADM

## 2025-06-09 RX ORDER — SODIUM CHLORIDE 9 MG/ML
INJECTION, SOLUTION INTRAVENOUS CONTINUOUS
Status: DISCONTINUED | OUTPATIENT
Start: 2025-06-09 | End: 2025-06-09 | Stop reason: HOSPADM

## 2025-06-09 RX ORDER — ONDANSETRON 2 MG/ML
4 INJECTION INTRAMUSCULAR; INTRAVENOUS
Status: DISCONTINUED | OUTPATIENT
Start: 2025-06-09 | End: 2025-06-09 | Stop reason: HOSPADM

## 2025-06-09 RX ORDER — SODIUM CHLORIDE 0.9 % (FLUSH) 0.9 %
5-40 SYRINGE (ML) INJECTION PRN
Status: DISCONTINUED | OUTPATIENT
Start: 2025-06-09 | End: 2025-06-09 | Stop reason: HOSPADM

## 2025-06-09 RX ORDER — FENTANYL CITRATE 50 UG/ML
INJECTION, SOLUTION INTRAMUSCULAR; INTRAVENOUS
Status: DISCONTINUED | OUTPATIENT
Start: 2025-06-09 | End: 2025-06-09 | Stop reason: SDUPTHER

## 2025-06-09 RX ORDER — BACITRACIN ZINC AND POLYMYXIN B SULFATE 500; 1000 [USP'U]/G; [USP'U]/G
OINTMENT TOPICAL
Status: DISCONTINUED | OUTPATIENT
Start: 2025-06-09 | End: 2025-06-09 | Stop reason: ALTCHOICE

## 2025-06-09 RX ORDER — DEXAMETHASONE SODIUM PHOSPHATE 4 MG/ML
INJECTION, SOLUTION INTRA-ARTICULAR; INTRALESIONAL; INTRAMUSCULAR; INTRAVENOUS; SOFT TISSUE
Status: DISCONTINUED | OUTPATIENT
Start: 2025-06-09 | End: 2025-06-09 | Stop reason: SDUPTHER

## 2025-06-09 RX ORDER — PROCHLORPERAZINE EDISYLATE 5 MG/ML
5 INJECTION INTRAMUSCULAR; INTRAVENOUS
Status: DISCONTINUED | OUTPATIENT
Start: 2025-06-09 | End: 2025-06-09 | Stop reason: HOSPADM

## 2025-06-09 RX ORDER — SODIUM CHLORIDE 0.9 % (FLUSH) 0.9 %
5-40 SYRINGE (ML) INJECTION EVERY 12 HOURS SCHEDULED
Status: DISCONTINUED | OUTPATIENT
Start: 2025-06-09 | End: 2025-06-09 | Stop reason: HOSPADM

## 2025-06-09 RX ORDER — NALOXONE HYDROCHLORIDE 0.4 MG/ML
INJECTION, SOLUTION INTRAMUSCULAR; INTRAVENOUS; SUBCUTANEOUS PRN
Status: DISCONTINUED | OUTPATIENT
Start: 2025-06-09 | End: 2025-06-09 | Stop reason: HOSPADM

## 2025-06-09 RX ORDER — MAGNESIUM HYDROXIDE 1200 MG/15ML
LIQUID ORAL CONTINUOUS PRN
Status: COMPLETED | OUTPATIENT
Start: 2025-06-09 | End: 2025-06-09

## 2025-06-09 RX ADMIN — ONDANSETRON 4 MG: 2 INJECTION INTRAMUSCULAR; INTRAVENOUS at 12:12

## 2025-06-09 RX ADMIN — BACITRACIN ZINC AND POLYMYXIN B SULFATE: 500; 10000 OINTMENT OPHTHALMIC at 08:36

## 2025-06-09 RX ADMIN — SODIUM CHLORIDE: 0.9 INJECTION, SOLUTION INTRAVENOUS at 11:30

## 2025-06-09 RX ADMIN — Medication 10 ML: at 09:00

## 2025-06-09 RX ADMIN — PHENYLEPHRINE HYDROCHLORIDE 100 MCG: 10 INJECTION INTRAVENOUS at 12:17

## 2025-06-09 RX ADMIN — Medication 10 ML: at 22:03

## 2025-06-09 RX ADMIN — FENTANYL CITRATE 100 MCG: 50 INJECTION, SOLUTION INTRAMUSCULAR; INTRAVENOUS at 12:10

## 2025-06-09 RX ADMIN — POLYVINYL ALCOHOL, POVIDONE 2 DROP: 14; 6 SOLUTION/ DROPS OPHTHALMIC at 08:47

## 2025-06-09 RX ADMIN — SULFUR HEXAFLUORIDE 2 ML: 60.7; .19; .19 INJECTION, POWDER, LYOPHILIZED, FOR SUSPENSION INTRAVENOUS; INTRAVESICAL at 10:14

## 2025-06-09 RX ADMIN — INSULIN LISPRO 2 UNITS: 100 INJECTION, SOLUTION INTRAVENOUS; SUBCUTANEOUS at 16:48

## 2025-06-09 RX ADMIN — DEXAMETHASONE SODIUM PHOSPHATE 8 MG: 4 INJECTION, SOLUTION INTRAMUSCULAR; INTRAVENOUS at 12:12

## 2025-06-09 RX ADMIN — CIPROFLOXACIN 400 MG: 2 INJECTION, SOLUTION INTRAVENOUS at 06:19

## 2025-06-09 RX ADMIN — ACETAMINOPHEN 650 MG: 325 TABLET ORAL at 06:23

## 2025-06-09 RX ADMIN — BACITRACIN ZINC AND POLYMYXIN B SULFATE: 500; 10000 OINTMENT OPHTHALMIC at 16:49

## 2025-06-09 RX ADMIN — PHENYLEPHRINE HYDROCHLORIDE 100 MCG: 10 INJECTION INTRAVENOUS at 12:14

## 2025-06-09 RX ADMIN — INSULIN LISPRO 2 UNITS: 100 INJECTION, SOLUTION INTRAVENOUS; SUBCUTANEOUS at 21:57

## 2025-06-09 RX ADMIN — POLYVINYL ALCOHOL, POVIDONE 2 DROP: 14; 6 SOLUTION/ DROPS OPHTHALMIC at 16:49

## 2025-06-09 RX ADMIN — PROPOFOL 150 MG: 10 INJECTION, EMULSION INTRAVENOUS at 12:10

## 2025-06-09 RX ADMIN — SODIUM CHLORIDE, PRESERVATIVE FREE 10 ML: 5 INJECTION INTRAVENOUS at 06:16

## 2025-06-09 RX ADMIN — INSULIN LISPRO 25 UNITS: 100 INJECTION, SOLUTION INTRAVENOUS; SUBCUTANEOUS at 16:48

## 2025-06-09 RX ADMIN — DAPTOMYCIN 480 MG: 500 INJECTION, POWDER, LYOPHILIZED, FOR SOLUTION INTRAVENOUS at 12:16

## 2025-06-09 RX ADMIN — INSULIN GLARGINE 42 UNITS: 100 INJECTION, SOLUTION SUBCUTANEOUS at 21:58

## 2025-06-09 RX ADMIN — BACITRACIN ZINC AND POLYMYXIN B SULFATE: 500; 10000 OINTMENT OPHTHALMIC at 21:57

## 2025-06-09 RX ADMIN — POLYVINYL ALCOHOL, POVIDONE 2 DROP: 14; 6 SOLUTION/ DROPS OPHTHALMIC at 21:57

## 2025-06-09 RX ADMIN — LIDOCAINE HYDROCHLORIDE 50 MG: 20 INJECTION, SOLUTION INFILTRATION; PERINEURAL at 12:10

## 2025-06-09 RX ADMIN — LEVOTHYROXINE SODIUM 100 MCG: 0.1 TABLET ORAL at 14:00

## 2025-06-09 RX ADMIN — ACETAMINOPHEN 650 MG: 325 TABLET ORAL at 01:40

## 2025-06-09 RX ADMIN — PANTOPRAZOLE SODIUM 40 MG: 40 TABLET, DELAYED RELEASE ORAL at 08:34

## 2025-06-09 RX ADMIN — SODIUM CHLORIDE, PRESERVATIVE FREE 10 ML: 5 INJECTION INTRAVENOUS at 22:09

## 2025-06-09 ASSESSMENT — PAIN SCALES - GENERAL
PAINLEVEL_OUTOF10: 0
PAINLEVEL_OUTOF10: 10
PAINLEVEL_OUTOF10: 3
PAINLEVEL_OUTOF10: 0
PAINLEVEL_OUTOF10: 0
PAINLEVEL_OUTOF10: 10
PAINLEVEL_OUTOF10: 0
PAINLEVEL_OUTOF10: 0

## 2025-06-09 ASSESSMENT — PAIN DESCRIPTION - LOCATION
LOCATION: HIP;BACK
LOCATION: BACK
LOCATION: TOE (COMMENT WHICH ONE);FOOT

## 2025-06-09 ASSESSMENT — PAIN DESCRIPTION - FREQUENCY
FREQUENCY: INTERMITTENT
FREQUENCY: CONTINUOUS

## 2025-06-09 ASSESSMENT — PAIN DESCRIPTION - DESCRIPTORS
DESCRIPTORS: ACHING
DESCRIPTORS: ACHING
DESCRIPTORS: NUMBNESS;ACHING

## 2025-06-09 ASSESSMENT — ENCOUNTER SYMPTOMS
COUGH: 0
EYE DISCHARGE: 0
DIARRHEA: 0
SHORTNESS OF BREATH: 0
BACK PAIN: 0
SORE THROAT: 0
CONSTIPATION: 0
NAUSEA: 0
SINUS PAIN: 0
WHEEZING: 0
RHINORRHEA: 0
SINUS PRESSURE: 0
ABDOMINAL PAIN: 0
EYE REDNESS: 0

## 2025-06-09 ASSESSMENT — PAIN DESCRIPTION - PAIN TYPE
TYPE: CHRONIC PAIN
TYPE: ACUTE PAIN

## 2025-06-09 ASSESSMENT — PAIN DESCRIPTION - ORIENTATION
ORIENTATION: RIGHT;LEFT
ORIENTATION: LEFT

## 2025-06-09 ASSESSMENT — PAIN - FUNCTIONAL ASSESSMENT: PAIN_FUNCTIONAL_ASSESSMENT: ACTIVITIES ARE NOT PREVENTED

## 2025-06-09 NOTE — ANESTHESIA POSTPROCEDURE EVALUATION
Department of Anesthesiology  Postprocedure Note    Patient: Demetrio Teran  MRN: [id number]  YOB: 1940  Date of evaluation: 6/9/2025    Procedure Summary       Date: 06/09/25 Room / Location: [address]    Anesthesia Start: 1207 Anesthesia Stop: 1245    Procedure: TOTAL DIGIT AMPUTATION LEFT THIRD TOE (Left: Foot) Diagnosis:       Osteomyelitis of third toe of left foot (HCC)      (Osteomyelitis of third toe of left foot (HCC) [M86.9])    Surgeons: Hernan Goldberg DPM Responsible Provider: Luis Martinez MD    Anesthesia Type: MAC ASA Status: 3            Anesthesia Type: No value filed.    Troy Phase I: Troy Score: 8    Troy Phase II:      Anesthesia Post Evaluation    Patient location during evaluation: PACU  Patient participation: complete - patient participated  Level of consciousness: awake  Airway patency: patent  Nausea & Vomiting: no nausea  Cardiovascular status: hemodynamically stable  Respiratory status: acceptable  Hydration status: euvolemic  Multimodal analgesia pain management approach  Pain management: adequate    No notable events documented.

## 2025-06-09 NOTE — DISCHARGE INSTRUCTIONS
[facility]   [address]   [facility] [address]   [phone]    Dr. Hernan Goldberg                                             Post Operative Instructions    1.  Have Prescriptions filled and take as directed.  All medications should be taken with food or milk.    2.  Keep foot elevated six inches above the level of the heart.  Support feet, legs, and knees with pillows.    3.  KEEP FOOT ELEVATED AS MUCH AS POSSIBLE UNTIL YOUR NEXT VISIT    4.  Place an ice pack on the bandaged site for 15 minutes every hour while awake    5.  Keep dressing clean, dry, and intact.  DO NOT REMOVE DRESSING.  CALL THE OFFICE IF BANDAGE COMES OFF.    6.  For the first 7 days after surgery, take temperature by mouth three times a day.  Call the office if greater than 101F.    7.  Ambulate with partial heel weight bearing to the left lower extremity with surgical shoe or crutches / walker.    8.  All instructions are to be followed until otherwise instructed by your surgeon.    9.  Call our office if you have any concerns or questions which arise.  Our phones are answered 24 hours a day.  [phone]    10.  Your first post-operative appointment is scheduled, call the office for appointment date and time if not known prior to today.

## 2025-06-09 NOTE — ANESTHESIA PRE PROCEDURE
Department of Anesthesiology  Preprocedure Note       Name:  Demetrio Teran   Age:  85 y.o.  :  1940                                          MRN:  [id number]         Date:  2025      Surgeon: Surgeon(s):  Hernan Goldberg DPM    Procedure: Procedure(s):  PARTIAL VERSUS TOTAL DIGIT AMPUTATION LEFT THIRD TOE    Medications prior to admission:   Prior to Admission medications    Medication Sig Start Date End Date Taking? Authorizing Provider   Cholecalciferol 1.25 MG (52089 UT) TABS Take 1 tablet by mouth every 14 days   Yes Nazanin Norman MD   rosuvastatin (CRESTOR) 20 MG tablet Take 1 tablet by mouth daily 25  Yes Dick Duggan MD   montelukast (SINGULAIR) 10 MG tablet TAKE 1 TABLET BY MOUTH DAILY 25  Yes Dick Duggan MD   gabapentin (NEURONTIN) 100 MG capsule TAKE ONE CAPSULE BY MOUTH ONCE NIGHTLY  DAYS 4/7/25 10/7/25 Yes Dick Duggan MD   levothyroxine (SYNTHROID) 100 MCG tablet TAKE 1 TABLET BY MOUTH DAILY 3/26/25  Yes Dick Duggan MD   insulin lispro (HUMALOG) 100 UNIT/ML injection cartridge 1.67 units/h with 2 units bolus.  Maximum 100 units daily. 24  Yes Dick Duggan MD   spironolactone (ALDACTONE) 25 MG tablet Take 1 tablet by mouth daily Taking half 24  Yes Dick Duggan MD   apixaban (ELIQUIS) 2.5 MG TABS tablet Take 1 tablet by mouth 2 times daily 24  Yes Nazanin Norman MD   furosemide (LASIX) 40 MG tablet Take 1 tablet by mouth daily 24 Yes Dick Duggan MD   ticagrelor (BRILINTA) 90 MG TABS tablet Take 1 tablet by mouth 2 times daily 10/19/23  Yes Nazanin Norman MD   silodosin (RAPAFLO) 8 MG CAPS Take 1 capsule by mouth every evening 23 Yes Dick Duggan MD   Insulin Disposable Pump (V-GO 40) 40 UNIT/24HR KIT 1 each by Does not apply route daily 23  Yes Dick Duggan MD   pantoprazole (PROTONIX) 40 MG tablet Take 1 tablet by mouth daily   Yes Provider, Historical, MD   potassium

## 2025-06-10 LAB
ALBUMIN SERPL-MCNC: 2.9 G/DL (ref 3.4–5)
ANION GAP SERPL CALCULATED.3IONS-SCNC: 11 MMOL/L (ref 3–16)
BUN SERPL-MCNC: 22 MG/DL (ref 7–20)
CALCIUM SERPL-MCNC: 8.6 MG/DL (ref 8.3–10.6)
CHLORIDE SERPL-SCNC: 105 MMOL/L (ref 99–110)
CO2 SERPL-SCNC: 19 MMOL/L (ref 21–32)
CREAT SERPL-MCNC: 1.3 MG/DL (ref 0.8–1.3)
GFR SERPLBLD CREATININE-BSD FMLA CKD-EPI: 54 ML/MIN/{1.73_M2}
GLUCOSE BLD-MCNC: 148 MG/DL (ref 70–99)
GLUCOSE BLD-MCNC: 187 MG/DL (ref 70–99)
GLUCOSE BLD-MCNC: 266 MG/DL (ref 70–99)
GLUCOSE BLD-MCNC: 77 MG/DL (ref 70–99)
GLUCOSE SERPL-MCNC: 201 MG/DL (ref 70–99)
PERFORMED ON: ABNORMAL
PERFORMED ON: NORMAL
PHOSPHATE SERPL-MCNC: 3.3 MG/DL (ref 2.5–4.9)
POTASSIUM SERPL-SCNC: 4.2 MMOL/L (ref 3.5–5.1)
SODIUM SERPL-SCNC: 135 MMOL/L (ref 136–145)

## 2025-06-10 PROCEDURE — 6370000000 HC RX 637 (ALT 250 FOR IP): Performed by: INTERNAL MEDICINE

## 2025-06-10 PROCEDURE — 6360000002 HC RX W HCPCS

## 2025-06-10 PROCEDURE — 2500000003 HC RX 250 WO HCPCS

## 2025-06-10 PROCEDURE — 80069 RENAL FUNCTION PANEL: CPT

## 2025-06-10 PROCEDURE — 1200000000 HC SEMI PRIVATE

## 2025-06-10 PROCEDURE — 6370000000 HC RX 637 (ALT 250 FOR IP)

## 2025-06-10 PROCEDURE — 6370000000 HC RX 637 (ALT 250 FOR IP): Performed by: STUDENT IN AN ORGANIZED HEALTH CARE EDUCATION/TRAINING PROGRAM

## 2025-06-10 PROCEDURE — 36415 COLL VENOUS BLD VENIPUNCTURE: CPT

## 2025-06-10 PROCEDURE — 97530 THERAPEUTIC ACTIVITIES: CPT

## 2025-06-10 PROCEDURE — 97116 GAIT TRAINING THERAPY: CPT

## 2025-06-10 PROCEDURE — 2580000003 HC RX 258

## 2025-06-10 PROCEDURE — 97535 SELF CARE MNGMENT TRAINING: CPT

## 2025-06-10 PROCEDURE — 99233 SBSQ HOSP IP/OBS HIGH 50: CPT | Performed by: INTERNAL MEDICINE

## 2025-06-10 RX ORDER — INSULIN GLARGINE 100 [IU]/ML
14 INJECTION, SOLUTION SUBCUTANEOUS NIGHTLY
Status: DISCONTINUED | OUTPATIENT
Start: 2025-06-10 | End: 2025-06-12 | Stop reason: HOSPADM

## 2025-06-10 RX ORDER — DOXYCYCLINE HYCLATE 100 MG
100 TABLET ORAL EVERY 12 HOURS SCHEDULED
Qty: 30 TABLET | Refills: 0 | Status: SHIPPED | OUTPATIENT
Start: 2025-06-10 | End: 2025-06-25

## 2025-06-10 RX ORDER — INSULIN LISPRO 100 [IU]/ML
15 INJECTION, SOLUTION INTRAVENOUS; SUBCUTANEOUS
Status: DISCONTINUED | OUTPATIENT
Start: 2025-06-11 | End: 2025-06-11

## 2025-06-10 RX ORDER — INSULIN GLARGINE 100 [IU]/ML
30 INJECTION, SOLUTION SUBCUTANEOUS DAILY
Status: DISCONTINUED | OUTPATIENT
Start: 2025-06-11 | End: 2025-06-12 | Stop reason: HOSPADM

## 2025-06-10 RX ORDER — INSULIN LISPRO 100 [IU]/ML
INJECTION, SOLUTION INTRAVENOUS; SUBCUTANEOUS
Qty: 3 ML | Refills: 0 | Status: SHIPPED | OUTPATIENT
Start: 2025-06-10

## 2025-06-10 RX ORDER — INSULIN LISPRO 100 [IU]/ML
25 INJECTION, SOLUTION INTRAVENOUS; SUBCUTANEOUS
Qty: 10 ML | Refills: 2 | Status: SHIPPED | OUTPATIENT
Start: 2025-06-10 | End: 2025-06-12

## 2025-06-10 RX ORDER — DOXYCYCLINE HYCLATE 100 MG
100 TABLET ORAL EVERY 12 HOURS SCHEDULED
Status: DISCONTINUED | OUTPATIENT
Start: 2025-06-10 | End: 2025-06-12 | Stop reason: HOSPADM

## 2025-06-10 RX ORDER — PEN NEEDLE, DIABETIC 29 GAUGE
1 NEEDLE, DISPOSABLE MISCELLANEOUS DAILY
Qty: 100 EACH | Refills: 3 | Status: SHIPPED | OUTPATIENT
Start: 2025-06-10

## 2025-06-10 RX ORDER — INSULIN GLARGINE 100 [IU]/ML
24 INJECTION, SOLUTION SUBCUTANEOUS EVERY MORNING
Qty: 10 ML | Refills: 3 | Status: SHIPPED | OUTPATIENT
Start: 2025-06-10 | End: 2025-06-12 | Stop reason: HOSPADM

## 2025-06-10 RX ORDER — INSULIN GLARGINE 100 [IU]/ML
42 INJECTION, SOLUTION SUBCUTANEOUS NIGHTLY
Qty: 10 ML | Refills: 3 | Status: SHIPPED | OUTPATIENT
Start: 2025-06-10 | End: 2025-06-12 | Stop reason: HOSPADM

## 2025-06-10 RX ADMIN — PANTOPRAZOLE SODIUM 40 MG: 40 TABLET, DELAYED RELEASE ORAL at 09:09

## 2025-06-10 RX ADMIN — DAPTOMYCIN 480 MG: 500 INJECTION, POWDER, LYOPHILIZED, FOR SOLUTION INTRAVENOUS at 11:51

## 2025-06-10 RX ADMIN — POLYVINYL ALCOHOL, POVIDONE 2 DROP: 14; 6 SOLUTION/ DROPS OPHTHALMIC at 21:34

## 2025-06-10 RX ADMIN — INSULIN LISPRO 4 UNITS: 100 INJECTION, SOLUTION INTRAVENOUS; SUBCUTANEOUS at 11:56

## 2025-06-10 RX ADMIN — BACITRACIN ZINC AND POLYMYXIN B SULFATE: 500; 10000 OINTMENT OPHTHALMIC at 09:10

## 2025-06-10 RX ADMIN — INSULIN LISPRO 25 UNITS: 100 INJECTION, SOLUTION INTRAVENOUS; SUBCUTANEOUS at 11:56

## 2025-06-10 RX ADMIN — INSULIN GLARGINE 24 UNITS: 100 INJECTION, SOLUTION SUBCUTANEOUS at 09:14

## 2025-06-10 RX ADMIN — POLYVINYL ALCOHOL, POVIDONE 2 DROP: 14; 6 SOLUTION/ DROPS OPHTHALMIC at 11:50

## 2025-06-10 RX ADMIN — INSULIN GLARGINE 14 UNITS: 100 INJECTION, SOLUTION SUBCUTANEOUS at 23:39

## 2025-06-10 RX ADMIN — BACITRACIN ZINC AND POLYMYXIN B SULFATE: 500; 10000 OINTMENT OPHTHALMIC at 11:50

## 2025-06-10 RX ADMIN — BACITRACIN ZINC AND POLYMYXIN B SULFATE: 500; 10000 OINTMENT OPHTHALMIC at 17:24

## 2025-06-10 RX ADMIN — INSULIN LISPRO 2 UNITS: 100 INJECTION, SOLUTION INTRAVENOUS; SUBCUTANEOUS at 09:09

## 2025-06-10 RX ADMIN — POLYVINYL ALCOHOL, POVIDONE 2 DROP: 14; 6 SOLUTION/ DROPS OPHTHALMIC at 17:27

## 2025-06-10 RX ADMIN — BACITRACIN ZINC AND POLYMYXIN B SULFATE: 500; 10000 OINTMENT OPHTHALMIC at 21:38

## 2025-06-10 RX ADMIN — DOXYCYCLINE HYCLATE 100 MG: 100 TABLET, COATED ORAL at 21:34

## 2025-06-10 RX ADMIN — APIXABAN 2.5 MG: 5 TABLET, FILM COATED ORAL at 21:34

## 2025-06-10 RX ADMIN — LEVOTHYROXINE SODIUM 100 MCG: 0.1 TABLET ORAL at 11:50

## 2025-06-10 RX ADMIN — Medication 10 ML: at 21:34

## 2025-06-10 RX ADMIN — POLYVINYL ALCOHOL, POVIDONE 2 DROP: 14; 6 SOLUTION/ DROPS OPHTHALMIC at 09:09

## 2025-06-10 RX ADMIN — INSULIN LISPRO 25 UNITS: 100 INJECTION, SOLUTION INTRAVENOUS; SUBCUTANEOUS at 09:09

## 2025-06-10 ASSESSMENT — PAIN SCALES - GENERAL
PAINLEVEL_OUTOF10: 0

## 2025-06-10 ASSESSMENT — ENCOUNTER SYMPTOMS
WHEEZING: 0
COUGH: 0
EYE DISCHARGE: 0
SORE THROAT: 0
BACK PAIN: 0
RHINORRHEA: 0
DIARRHEA: 0
SINUS PRESSURE: 0
ABDOMINAL PAIN: 0
EYE REDNESS: 0
SINUS PAIN: 0
CONSTIPATION: 0
NAUSEA: 0
SHORTNESS OF BREATH: 0

## 2025-06-10 NOTE — DISCHARGE INSTR - COC
Continuity of Care Form    Patient Name: Demetrio Teran   :  1940  MRN:  [id number]    Admit date:  2025  Discharge date:  ***    Code Status Order: Full Code   Advance Directives:    Date/Time Healthcare Directive Type of Healthcare Directive Copy in Chart Healthcare Agent Appointed Healthcare Agent's Name Healthcare Agent's Phone Number    25 [phone] Yes, patient has an advance directive for healthcare treatment  Other (Comment)  --  Adult Children  Has trust - Children are agents  --     2546 No, patient does not have an advance directive for healthcare treatment  --  --  --  --  --             Admitting Physician:  Pasha Galvin MD  PCP: Dick Duggan MD    Discharging Nurse: ***  Discharging Hospital Unit/Room#: 3AN-3326/3326-01  Discharging Unit Phone Number: ***    Emergency Contact:   Extended Emergency Contact Information  Primary Emergency Contact: Oskar Hayward  Home Phone: [phone]  Mobile Phone: [phone]  Relation: Child  Secondary Emergency Contact: Jaylyn Hayward  Home Phone: [phone]  Mobile Phone: [phone]  Relation: Child    Past Surgical History:  Past Surgical History:   Procedure Laterality Date    CARDIAC PACEMAKER PLACEMENT Left     CATARACT REMOVAL      CHOLECYSTECTOMY, LAPAROSCOPIC  2018    with cholangiogram    COLONOSCOPY      EYE SURGERY      cataracts Glaucoma surgery    HEMORRHOID SURGERY      JOINT REPLACEMENT  2013    left knee    KNEE ARTHROSCOPY  1973    right    KNEE SURGERY Left [id number]    bone fragments    KNEE SURGERY Left     meniscus    MITRAL VALVE REPLACEMENT      PROSTATE SURGERY      RETINAL LASER      TOE AMPUTATION Left 2025    TOTAL DIGIT AMPUTATION LEFT THIRD TOE performed by Hernan Goldberg DPM at [facility] OR       Immunization History:   Immunization History   Administered Date(s) Administered    COVID-19, MODERNA BLUE border, Primary or Immunocompromised, (age 12y+), IM, 100 mcg/0.5mL

## 2025-06-10 NOTE — OP NOTE
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 ([facility]) [M86.9]     Post-Op Diagnosis: Same       Procedure(s):  TOTAL DIGIT AMPUTATION LEFT THIRD TOE     Surgeon(s):  Hernan Goldberg DPM     Assistant:  Resident: Clay Hoskins DPM Matthew Davis McCarty PGY-1      Anesthesia: Choice     Hemostasis: anatomic dissection and      Injectables: Pre-Op 10 cc of 1% Lidocaine plain and Post-Op 10 cc of 0.5 % Marcaine plain     Materials: 3-0 nylon      Implants: None           Estimated Blood Loss (mL): Minimal     Complications: None     Specimens:   ID Type Source Tests Collected by Time Destination   A : a.left third toe Tissue Tissue SURGICAL PATHOLOGY Hernan Goldberg DPM 6/9/2025 1228           Implants:  * No implants in log *      Drains:        External Urinary Catheter (Active)   Site Assessment Clean,dry & intact 06/09/25 0627   Placement Replaced 06/08/25 1737   Securement Method Securing device (Describe) 06/07/25 2255   Catheter Care Catheter/Wick replaced 06/08/25 1737   Perineal Care No 06/09/25 0627   Suction 40 mmgHg continuous 06/09/25 0627   Urine Color Yellow 06/09/25 0627   Urine Appearance Clear 06/09/25 0627   Output (mL) 900 mL 06/09/25 0627         Findings:  Infection Present At Time Of Surgery (PATOS) (choose all levels that have infection present):  - Organ Space infection (below fascia) present as evidenced by osteomyelitis  Other Findings: No purulence encountered. 3rd digit sent for permanent pathology. Adequate skin integrity present for primary closure. Procedure felt to be definitive in resection of osteomyelitic bone.     Detailed Description of Procedure:   INDICATIONS FOR PROCEDURE: This patient has signs and symptoms clinically and radiographically consistent with the above mentioned preoperative diagnosis. Having

## 2025-06-10 NOTE — FLOWSHEET NOTE
06/10/25 0915   Vital Signs   Temp 97.9 °F (36.6 °C)   Temp Source Oral   Pulse 66   Heart Rate Source Monitor   Respirations 16   BP (!) 110/55   MAP (Calculated) 73   BP Location Left upper arm   BP Method Automatic   Patient Position Sitting;Up in chair   Pain Assessment   Pain Assessment 0-10   Pain Level 0   Patient's Stated Pain Goal 0 - No pain   Response to Pain Intervention Patient satisfied   Side Effects No side effects reported or observed   Opioid-Induced Sedation   POSS Score 1   RASS   Ackerman Agitation Sedation Scale (RASS) 0   Oxygen Therapy   SpO2 97 %   O2 Device None (Room air)

## 2025-06-11 LAB
ALBUMIN SERPL-MCNC: 2.8 G/DL (ref 3.4–5)
ANION GAP SERPL CALCULATED.3IONS-SCNC: 11 MMOL/L (ref 3–16)
BUN SERPL-MCNC: 26 MG/DL (ref 7–20)
CALCIUM SERPL-MCNC: 8.4 MG/DL (ref 8.3–10.6)
CHLORIDE SERPL-SCNC: 104 MMOL/L (ref 99–110)
CO2 SERPL-SCNC: 21 MMOL/L (ref 21–32)
CREAT SERPL-MCNC: 1.3 MG/DL (ref 0.8–1.3)
CRP SERPL-MCNC: 7.1 MG/L (ref 0–5.1)
ERYTHROCYTE [SEDIMENTATION RATE] IN BLOOD BY WESTERGREN METHOD: 32 MM/HR (ref 0–20)
GFR SERPLBLD CREATININE-BSD FMLA CKD-EPI: 54 ML/MIN/{1.73_M2}
GLUCOSE BLD-MCNC: 103 MG/DL (ref 70–99)
GLUCOSE BLD-MCNC: 114 MG/DL (ref 70–99)
GLUCOSE BLD-MCNC: 143 MG/DL (ref 70–99)
GLUCOSE BLD-MCNC: 246 MG/DL (ref 70–99)
GLUCOSE BLD-MCNC: 274 MG/DL (ref 70–99)
GLUCOSE BLD-MCNC: 75 MG/DL (ref 70–99)
GLUCOSE SERPL-MCNC: 122 MG/DL (ref 70–99)
PERFORMED ON: ABNORMAL
PERFORMED ON: NORMAL
PHOSPHATE SERPL-MCNC: 3.6 MG/DL (ref 2.5–4.9)
POTASSIUM SERPL-SCNC: 3.7 MMOL/L (ref 3.5–5.1)
SODIUM SERPL-SCNC: 136 MMOL/L (ref 136–145)

## 2025-06-11 PROCEDURE — 6370000000 HC RX 637 (ALT 250 FOR IP): Performed by: INTERNAL MEDICINE

## 2025-06-11 PROCEDURE — 85652 RBC SED RATE AUTOMATED: CPT

## 2025-06-11 PROCEDURE — 2580000003 HC RX 258

## 2025-06-11 PROCEDURE — 2500000003 HC RX 250 WO HCPCS

## 2025-06-11 PROCEDURE — 99232 SBSQ HOSP IP/OBS MODERATE 35: CPT | Performed by: INTERNAL MEDICINE

## 2025-06-11 PROCEDURE — 86140 C-REACTIVE PROTEIN: CPT

## 2025-06-11 PROCEDURE — 36415 COLL VENOUS BLD VENIPUNCTURE: CPT

## 2025-06-11 PROCEDURE — 1200000000 HC SEMI PRIVATE

## 2025-06-11 PROCEDURE — 6370000000 HC RX 637 (ALT 250 FOR IP)

## 2025-06-11 PROCEDURE — 80069 RENAL FUNCTION PANEL: CPT

## 2025-06-11 PROCEDURE — 94760 N-INVAS EAR/PLS OXIMETRY 1: CPT

## 2025-06-11 PROCEDURE — 6370000000 HC RX 637 (ALT 250 FOR IP): Performed by: STUDENT IN AN ORGANIZED HEALTH CARE EDUCATION/TRAINING PROGRAM

## 2025-06-11 PROCEDURE — 6360000002 HC RX W HCPCS

## 2025-06-11 RX ORDER — INSULIN LISPRO 100 [IU]/ML
12 INJECTION, SOLUTION INTRAVENOUS; SUBCUTANEOUS
Status: DISCONTINUED | OUTPATIENT
Start: 2025-06-11 | End: 2025-06-12 | Stop reason: HOSPADM

## 2025-06-11 RX ADMIN — APIXABAN 2.5 MG: 5 TABLET, FILM COATED ORAL at 09:45

## 2025-06-11 RX ADMIN — POLYVINYL ALCOHOL, POVIDONE 2 DROP: 14; 6 SOLUTION/ DROPS OPHTHALMIC at 22:16

## 2025-06-11 RX ADMIN — INSULIN GLARGINE 30 UNITS: 100 INJECTION, SOLUTION SUBCUTANEOUS at 09:46

## 2025-06-11 RX ADMIN — DOXYCYCLINE HYCLATE 100 MG: 100 TABLET, COATED ORAL at 09:45

## 2025-06-11 RX ADMIN — INSULIN GLARGINE 14 UNITS: 100 INJECTION, SOLUTION SUBCUTANEOUS at 22:16

## 2025-06-11 RX ADMIN — BACITRACIN ZINC AND POLYMYXIN B SULFATE: 500; 10000 OINTMENT OPHTHALMIC at 08:03

## 2025-06-11 RX ADMIN — INSULIN LISPRO 4 UNITS: 100 INJECTION, SOLUTION INTRAVENOUS; SUBCUTANEOUS at 22:16

## 2025-06-11 RX ADMIN — DAPTOMYCIN 480 MG: 500 INJECTION, POWDER, LYOPHILIZED, FOR SOLUTION INTRAVENOUS at 09:41

## 2025-06-11 RX ADMIN — PANTOPRAZOLE SODIUM 40 MG: 40 TABLET, DELAYED RELEASE ORAL at 09:45

## 2025-06-11 RX ADMIN — LEVOTHYROXINE SODIUM 100 MCG: 0.1 TABLET ORAL at 15:51

## 2025-06-11 RX ADMIN — Medication 10 ML: at 22:17

## 2025-06-11 RX ADMIN — POLYVINYL ALCOHOL, POVIDONE 2 DROP: 14; 6 SOLUTION/ DROPS OPHTHALMIC at 10:17

## 2025-06-11 RX ADMIN — INSULIN LISPRO 15 UNITS: 100 INJECTION, SOLUTION INTRAVENOUS; SUBCUTANEOUS at 09:45

## 2025-06-11 RX ADMIN — DOXYCYCLINE HYCLATE 100 MG: 100 TABLET, COATED ORAL at 22:16

## 2025-06-11 RX ADMIN — Medication 10 ML: at 09:48

## 2025-06-11 RX ADMIN — POLYVINYL ALCOHOL, POVIDONE 2 DROP: 14; 6 SOLUTION/ DROPS OPHTHALMIC at 15:51

## 2025-06-11 RX ADMIN — APIXABAN 2.5 MG: 5 TABLET, FILM COATED ORAL at 22:16

## 2025-06-11 ASSESSMENT — ENCOUNTER SYMPTOMS
EYE DISCHARGE: 0
BACK PAIN: 0
ABDOMINAL PAIN: 0
DIARRHEA: 0
SINUS PAIN: 0
SORE THROAT: 0
SHORTNESS OF BREATH: 0
CONSTIPATION: 0
WHEEZING: 0
EYE REDNESS: 0
SINUS PRESSURE: 0
NAUSEA: 0
RHINORRHEA: 0
COUGH: 0

## 2025-06-11 NOTE — CARE COORDINATION
Discharge Planning:    CM called Madyson with [employer] to run IV benefits, awaiting CB on benefits.    Electronically signed by Vipin Kerns on 6/10/2025 at 4:09 PM    
Discharge Planning:    MARCIA spoke with Lavinia with [facility] and she is able to accept pt. She had the pt last year as well.  [address],   [place] [id number]  Phone: [phone]  Fax: [phone]    Electronically signed by Vipin Kerns on 6/11/2025 at 12:15 PM      
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  Patient seen by Dr Adriel DPM. Orders given to paint affected area with betadine and leave open to air.  Spoke to nurse Hahn, patient may have amputation.  Not new orders. Will continue to follow this admission.          BYRON BURRELLN, RN, CWOCN  Inpatient  Wound/Ostomy Care  [phone]     
the Discharge Plan?      Vipin Kerns  Case Management Department  Ph: [phone] Fax: [phone]    Electronically signed by Vipin Kerns on 6/11/2025 at 12:26 PM     06/11/25 1221   Service Assessment   Patient Orientation Alert and Oriented   Cognition Alert   History Provided By Patient   Primary Caregiver Self   Accompanied By/Relationship self   Support Systems Children   Patient's Healthcare Decision Maker is: Legal Next of Kin   PCP Verified by CM Yes   Last Visit to PCP Within last 6 months   Prior Functional Level Independent in ADLs/IADLs   Current Functional Level Independent in ADLs/IADLs   Can patient return to prior living arrangement Yes   Ability to make needs known: Good   Family able to assist with home care needs: Yes   Would you like for me to discuss the discharge plan with any other family members/significant others, and if so, who? Yes  (daughter)   Financial Resources Medicare   Community Resources None   CM/SW Referral Other (see comment)  (DC assistance and planning)   Discharge Planning   Type of Residence House  (condo)   Living Arrangements Alone   Current Services Prior To Admission None;Durable Medical Equipment   Current DME Prior to Arrival Walker;Cane   Potential Assistance Needed Home Care   DME Ordered? No   Potential Assistance Purchasing Medications No   Type of Home Care Services None   Patient expects to be discharged to: House   One/Two Story Residence Two story  (1 step to enter)   Services At/After Discharge   Transition of Care Consult (CM Consult) Home Health   Internal Home Health Yes   Services At/After Discharge Home Health   Mode of Transport at Discharge Other (see comment)  (family)   Confirm Follow Up Transport Family

## 2025-06-12 VITALS
BODY MASS INDEX: 29.59 KG/M2 | DIASTOLIC BLOOD PRESSURE: 71 MMHG | OXYGEN SATURATION: 95 % | TEMPERATURE: 97.8 F | SYSTOLIC BLOOD PRESSURE: 137 MMHG | HEART RATE: 72 BPM | RESPIRATION RATE: 16 BRPM | HEIGHT: 70 IN | WEIGHT: 206.7 LBS

## 2025-06-12 LAB
ANION GAP SERPL CALCULATED.3IONS-SCNC: 11 MMOL/L (ref 3–16)
BUN SERPL-MCNC: 20 MG/DL (ref 7–20)
CALCIUM SERPL-MCNC: 8.4 MG/DL (ref 8.3–10.6)
CHLORIDE SERPL-SCNC: 104 MMOL/L (ref 99–110)
CO2 SERPL-SCNC: 23 MMOL/L (ref 21–32)
CREAT SERPL-MCNC: 1.3 MG/DL (ref 0.8–1.3)
GFR SERPLBLD CREATININE-BSD FMLA CKD-EPI: 54 ML/MIN/{1.73_M2}
GLUCOSE BLD-MCNC: 70 MG/DL (ref 70–99)
GLUCOSE BLD-MCNC: 90 MG/DL (ref 70–99)
GLUCOSE SERPL-MCNC: 97 MG/DL (ref 70–99)
PERFORMED ON: NORMAL
PERFORMED ON: NORMAL
POTASSIUM SERPL-SCNC: 3.6 MMOL/L (ref 3.5–5.1)
SODIUM SERPL-SCNC: 138 MMOL/L (ref 136–145)

## 2025-06-12 PROCEDURE — 6370000000 HC RX 637 (ALT 250 FOR IP)

## 2025-06-12 PROCEDURE — 6370000000 HC RX 637 (ALT 250 FOR IP): Performed by: INTERNAL MEDICINE

## 2025-06-12 PROCEDURE — 2500000003 HC RX 250 WO HCPCS

## 2025-06-12 PROCEDURE — 80048 BASIC METABOLIC PNL TOTAL CA: CPT

## 2025-06-12 PROCEDURE — 6370000000 HC RX 637 (ALT 250 FOR IP): Performed by: STUDENT IN AN ORGANIZED HEALTH CARE EDUCATION/TRAINING PROGRAM

## 2025-06-12 RX ORDER — INSULIN GLARGINE 100 [IU]/ML
26 INJECTION, SOLUTION SUBCUTANEOUS ONCE
Status: COMPLETED | OUTPATIENT
Start: 2025-06-12 | End: 2025-06-12

## 2025-06-12 RX ORDER — INSULIN GLARGINE 100 [IU]/ML
30 INJECTION, SOLUTION SUBCUTANEOUS DAILY
Qty: 10 ML | Refills: 3 | Status: SHIPPED | OUTPATIENT
Start: 2025-06-13

## 2025-06-12 RX ORDER — INSULIN LISPRO 100 [IU]/ML
12 INJECTION, SOLUTION INTRAVENOUS; SUBCUTANEOUS
Qty: 10 ML | Refills: 2 | Status: SHIPPED | OUTPATIENT
Start: 2025-06-12

## 2025-06-12 RX ORDER — INSULIN GLARGINE 100 [IU]/ML
10 INJECTION, SOLUTION SUBCUTANEOUS NIGHTLY
Qty: 10 ML | Refills: 3 | Status: SHIPPED | OUTPATIENT
Start: 2025-06-12

## 2025-06-12 RX ADMIN — APIXABAN 2.5 MG: 5 TABLET, FILM COATED ORAL at 08:29

## 2025-06-12 RX ADMIN — Medication 10 ML: at 08:41

## 2025-06-12 RX ADMIN — DOXYCYCLINE HYCLATE 100 MG: 100 TABLET, COATED ORAL at 08:29

## 2025-06-12 RX ADMIN — PANTOPRAZOLE SODIUM 40 MG: 40 TABLET, DELAYED RELEASE ORAL at 08:29

## 2025-06-12 RX ADMIN — INSULIN GLARGINE 26 UNITS: 100 INJECTION, SOLUTION SUBCUTANEOUS at 08:30

## 2025-06-12 RX ADMIN — INSULIN LISPRO 12 UNITS: 100 INJECTION, SOLUTION INTRAVENOUS; SUBCUTANEOUS at 08:31

## 2025-06-12 RX ADMIN — POLYVINYL ALCOHOL, POVIDONE 2 DROP: 14; 6 SOLUTION/ DROPS OPHTHALMIC at 08:40

## 2025-06-12 NOTE — PLAN OF CARE
Problem: Chronic Conditions and Co-morbidities  Goal: Patient's chronic conditions and co-morbidity symptoms are monitored and maintained or improved  6/10/2025 2321 by Delaney Cerna RN  Outcome: Progressing    Problem: Metabolic/Fluid and Electrolytes - Adult  Goal: Glucose maintained within prescribed range  Outcome: Progressing  Goal: Hemodynamic stability and optimal renal function maintained  Outcome: Progressing        Problem: Discharge Planning  Goal: Discharge to home or other facility with appropriate resources  6/10/2025 2321 by Delaney Cerna RN  Outcome: Progressing     Problem: Pain  Goal: Verbalizes/displays adequate comfort level or baseline comfort level  6/10/2025 2321 by Delaney Cerna RN  Outcome: Progressing     Problem: Safety - Adult  Goal: Free from fall injury  6/10/2025 2321 by Delaney Crena RN  Outcome: Progressing     Problem: Skin/Tissue Integrity  Goal: Skin integrity remains intact  Description: 1.  Monitor for areas of redness and/or skin breakdown2.  Assess vascular access sites hourly3.  Every 4-6 hours minimum:  Change oxygen saturation probe site4.  Every 4-6 hours:  If on nasal continuous positive airway pressure, respiratory therapy assess nares and determine need for appliance change or resting period  6/10/2025 2321 by Delaney Cerna RN  Outcome: Progressing     Problem: ABCDS Injury Assessment  Goal: Absence of physical injury  6/10/2025 2321 by Delaney Cerna RN  Outcome: Progressing     
  Problem: Chronic Conditions and Co-morbidities  Goal: Patient's chronic conditions and co-morbidity symptoms are monitored and maintained or improved  6/6/2025 2237 by Asia Fonseca, RN  Outcome: Progressing     Problem: Discharge Planning  Goal: Discharge to home or other facility with appropriate resources  6/6/2025 2237 by Asia Fonseca, RN  Outcome: Progressing     Problem: Pain  Goal: Verbalizes/displays adequate comfort level or baseline comfort level  6/6/2025 2237 by Asia Fonseca, RN  Outcome: Progressing    Problem: Safety - Adult  Goal: Free from fall injury       
  Problem: Chronic Conditions and Co-morbidities  Goal: Patient's chronic conditions and co-morbidity symptoms are monitored and maintained or improved  6/8/2025 0109 by Mariia Larios RN  Outcome: Progressing  6/7/2025 1726 by Genna Daly RN  Outcome: Progressing     Problem: Discharge Planning  Goal: Discharge to home or other facility with appropriate resources  6/8/2025 0109 by Mariia Larios RN  Outcome: Progressing  6/7/2025 1726 by Genna Daly RN  Outcome: Progressing     Problem: Pain  Goal: Verbalizes/displays adequate comfort level or baseline comfort level  6/8/2025 0109 by Mariia Larios RN  Outcome: Progressing  6/7/2025 1726 by Genna Daly RN  Outcome: Progressing     Problem: Safety - Adult  Goal: Free from fall injury  6/8/2025 0109 by Mariia Larios RN  Outcome: Progressing  6/7/2025 1726 by Genna Daly RN  Outcome: Progressing     Problem: Skin/Tissue Integrity  Goal: Skin integrity remains intact  Description: 1.  Monitor for areas of redness and/or skin breakdown2.  Assess vascular access sites hourly3.  Every 4-6 hours minimum:  Change oxygen saturation probe site4.  Every 4-6 hours:  If on nasal continuous positive airway pressure, respiratory therapy assess nares and determine need for appliance change or resting period  6/8/2025 0109 by Mariia Larios RN  Outcome: Progressing  6/7/2025 1726 by Genna Daly RN  Outcome: Progressing  Flowsheets (Taken 6/7/2025 1242)  Skin Integrity Remains Intact: Monitor for areas of redness and/or skin breakdown     Problem: ABCDS Injury Assessment  Goal: Absence of physical injury  6/8/2025 0109 by Mariia Larios RN  Outcome: Progressing  6/7/2025 1726 by Genna Daly RN  Outcome: Progressing     
  Problem: Chronic Conditions and Co-morbidities  Goal: Patient's chronic conditions and co-morbidity symptoms are monitored and maintained or improved  Outcome: Adequate for Discharge     Problem: Discharge Planning  Goal: Discharge to home or other facility with appropriate resources  Outcome: Adequate for Discharge     Problem: Pain  Goal: Verbalizes/displays adequate comfort level or baseline comfort level  Outcome: Adequate for Discharge     Problem: Safety - Adult  Goal: Free from fall injury  Outcome: Adequate for Discharge     Problem: Skin/Tissue Integrity  Goal: Skin integrity remains intact  Description: 1.  Monitor for areas of redness and/or skin breakdown2.  Assess vascular access sites hourly3.  Every 4-6 hours minimum:  Change oxygen saturation probe site4.  Every 4-6 hours:  If on nasal continuous positive airway pressure, respiratory therapy assess nares and determine need for appliance change or resting period  Outcome: Adequate for Discharge     Problem: ABCDS Injury Assessment  Goal: Absence of physical injury  Outcome: Adequate for Discharge     Problem: Metabolic/Fluid and Electrolytes - Adult  Goal: Hemodynamic stability and optimal renal function maintained  Outcome: Adequate for Discharge  Goal: Glucose maintained within prescribed range  Outcome: Adequate for Discharge     
  Problem: Chronic Conditions and Co-morbidities  Goal: Patient's chronic conditions and co-morbidity symptoms are monitored and maintained or improved  Outcome: Progressing     Problem: Discharge Planning  Goal: Discharge to home or other facility with appropriate resources  Outcome: Progressing     Problem: Pain  Goal: Verbalizes/displays adequate comfort level or baseline comfort level  Outcome: Progressing     Problem: Safety - Adult  Goal: Free from fall injury  Outcome: Progressing     Problem: Skin/Tissue Integrity  Goal: Skin integrity remains intact  Description: 1.  Monitor for areas of redness and/or skin breakdown2.  Assess vascular access sites hourly3.  Every 4-6 hours minimum:  Change oxygen saturation probe site4.  Every 4-6 hours:  If on nasal continuous positive airway pressure, respiratory therapy assess nares and determine need for appliance change or resting period  Outcome: Progressing  Flowsheets (Taken 6/6/2025 1121)  Skin Integrity Remains Intact: Monitor for areas of redness and/or skin breakdown     Problem: ABCDS Injury Assessment  Goal: Absence of physical injury  Outcome: Progressing     
  Problem: Chronic Conditions and Co-morbidities  Goal: Patient's chronic conditions and co-morbidity symptoms are monitored and maintained or improved  Outcome: Progressing     Problem: Discharge Planning  Goal: Discharge to home or other facility with appropriate resources  Outcome: Progressing     Problem: Pain  Goal: Verbalizes/displays adequate comfort level or baseline comfort level  Outcome: Progressing     Problem: Safety - Adult  Goal: Free from fall injury  Outcome: Progressing     Problem: Skin/Tissue Integrity  Goal: Skin integrity remains intact  Description: 1.  Monitor for areas of redness and/or skin breakdown2.  Assess vascular access sites hourly3.  Every 4-6 hours minimum:  Change oxygen saturation probe site4.  Every 4-6 hours:  If on nasal continuous positive airway pressure, respiratory therapy assess nares and determine need for appliance change or resting period  Outcome: Progressing  Flowsheets (Taken 6/7/2025 1242)  Skin Integrity Remains Intact: Monitor for areas of redness and/or skin breakdown     Problem: ABCDS Injury Assessment  Goal: Absence of physical injury  Outcome: Progressing     
  Problem: Chronic Conditions and Co-morbidities  Goal: Patient's chronic conditions and co-morbidity symptoms are monitored and maintained or improved  Outcome: Progressing  Flowsheets (Taken 6/8/2025 0819)  Care Plan - Patient's Chronic Conditions and Co-Morbidity Symptoms are Monitored and Maintained or Improved:   Collaborate with multidisciplinary team to address chronic and comorbid conditions and prevent exacerbation or deterioration   Monitor and assess patient's chronic conditions and comorbid symptoms for stability, deterioration, or improvement     Problem: Discharge Planning  Goal: Discharge to home or other facility with appropriate resources  Outcome: Progressing  Flowsheets (Taken 6/8/2025 0819)  Discharge to home or other facility with appropriate resources: Identify barriers to discharge with patient and caregiver     Problem: Pain  Goal: Verbalizes/displays adequate comfort level or baseline comfort level  Outcome: Progressing     Problem: Safety - Adult  Goal: Free from fall injury  Outcome: Progressing     Problem: Skin/Tissue Integrity  Goal: Skin integrity remains intact  Description: 1.  Monitor for areas of redness and/or skin breakdown2.  Assess vascular access sites hourly3.  Every 4-6 hours minimum:  Change oxygen saturation probe site4.  Every 4-6 hours:  If on nasal continuous positive airway pressure, respiratory therapy assess nares and determine need for appliance change or resting period  Outcome: Progressing  Flowsheets  Taken 6/8/2025 0819  Skin Integrity Remains Intact: Monitor for areas of redness and/or skin breakdown  Taken 6/8/2025 0818  Skin Integrity Remains Intact: Monitor for areas of redness and/or skin breakdown     Problem: ABCDS Injury Assessment  Goal: Absence of physical injury  Outcome: Progressing  Flowsheets (Taken 6/8/2025 0818)  Absence of Physical Injury: Implement safety measures based on patient assessment     
  Problem: Chronic Conditions and Co-morbidities  Goal: Patient's chronic conditions and co-morbidity symptoms are monitored and maintained or improved  Outcome: Progressing  Flowsheets (Taken 6/9/2025 2200)  Care Plan - Patient's Chronic Conditions and Co-Morbidity Symptoms are Monitored and Maintained or Improved:   Monitor and assess patient's chronic conditions and comorbid symptoms for stability, deterioration, or improvement   Collaborate with multidisciplinary team to address chronic and comorbid conditions and prevent exacerbation or deterioration   Update acute care plan with appropriate goals if chronic or comorbid symptoms are exacerbated and prevent overall improvement and discharge     Problem: Discharge Planning  Goal: Discharge to home or other facility with appropriate resources  Outcome: Progressing  Flowsheets (Taken 6/9/2025 2200)  Discharge to home or other facility with appropriate resources:   Identify barriers to discharge with patient and caregiver   Arrange for needed discharge resources and transportation as appropriate   Identify discharge learning needs (meds, wound care, etc)   Refer to discharge planning if patient needs post-hospital services based on physician order or complex needs related to functional status, cognitive ability or social support system     Problem: Pain  Goal: Verbalizes/displays adequate comfort level or baseline comfort level  Outcome: Progressing  Flowsheets (Taken 6/9/2025 2200)  Verbalizes/displays adequate comfort level or baseline comfort level:   Encourage patient to monitor pain and request assistance   Assess pain using appropriate pain scale   Administer analgesics based on type and severity of pain and evaluate response   Implement non-pharmacological measures as appropriate and evaluate response   Consider cultural and social influences on pain and pain management   Notify Licensed Independent Practitioner if interventions unsuccessful or patient 
  Problem: Pain  Goal: Verbalizes/displays adequate comfort level or baseline comfort level  Outcome: Progressing  Flowsheets (Taken 6/5/2025 0637)  Verbalizes/displays adequate comfort level or baseline comfort level:   Encourage patient to monitor pain and request assistance   Administer analgesics based on type and severity of pain and evaluate response   Assess pain using appropriate pain scale   Implement non-pharmacological measures as appropriate and evaluate response     Problem: Safety - Adult  Goal: Free from fall injury  Outcome: Progressing  Flowsheets (Taken 6/5/2025 0637)  Free From Fall Injury: Instruct family/caregiver on patient safety     Problem: Skin/Tissue Integrity  Goal: Skin integrity remains intact  Description: 1.  Monitor for areas of redness and/or skin breakdown2.  Assess vascular access sites hourly3.  Every 4-6 hours minimum:  Change oxygen saturation probe site4.  Every 4-6 hours:  If on nasal continuous positive airway pressure, respiratory therapy assess nares and determine need for appliance change or resting period  Outcome: Progressing  Flowsheets (Taken 6/5/2025 0637)  Skin Integrity Remains Intact: Monitor for areas of redness and/or skin breakdown     Problem: ABCDS Injury Assessment  Goal: Absence of physical injury  Outcome: Progressing  Flowsheets (Taken 6/5/2025 0637)  Absence of Physical Injury: Implement safety measures based on patient assessment     
Podiatric Surgery Plan of Care Note  Demetrio Teran    Podiatry planning OR tomorrow for Partial vs. Full 3rd digit amputation, Left foot. All labs and images have been ordered.    -Consent obtained    -NPO at midnight   -Arlin Morel DPM, MS  Podiatric Resident PGY-1  [employer]  Pager #[phone]  6/8/2025, 4:20 PM    
Verbalizes/displays adequate comfort level or baseline comfort level  6/10/2025 1536 by Laura Saleh RN  Outcome: Progressing  Flowsheets (Taken 6/10/2025 1147)  Verbalizes/displays adequate comfort level or baseline comfort level: Encourage patient to monitor pain and request assistance  6/10/2025 0429 by Francine Lenz, RN  Outcome: Progressing  Flowsheets (Taken 6/9/2025 2200)  Verbalizes/displays adequate comfort level or baseline comfort level:   Encourage patient to monitor pain and request assistance   Assess pain using appropriate pain scale   Administer analgesics based on type and severity of pain and evaluate response   Implement non-pharmacological measures as appropriate and evaluate response   Consider cultural and social influences on pain and pain management   Notify Licensed Independent Practitioner if interventions unsuccessful or patient reports new pain     Problem: Safety - Adult  Goal: Free from fall injury  6/10/2025 1536 by Laura Saleh, RN  Outcome: Progressing  6/10/2025 0429 by Francine Lenz RN  Outcome: Progressing

## 2025-06-12 NOTE — PROGRESS NOTES
Hospitalist Progress Note      PCP: Dick Duggan MD    Date of Admission: 6/4/2025    LOS: 6    Chief Complaint:   Chief Complaint   Patient presents with    Wound Infection     Pt via self from home, c/o middle left toe infection, saw podiatrist today, is diabetic        Case Summary:   85-year-old gentleman with history of type 2 diabetes, hypertension, hyperlipidemia, stage III CKD, GERD, PAF, diastolic heart failure, aortic stenosis status post TAVR who presented from podiatry clinic with osteomyelitis of the left third digit complicated by acute kidney injury and hyponatremia        Principal Problem:    Osteomyelitis and Cellulitis, left foot third digit due to Diabetic foot infection: No new complaints.  Adequate pain control.  Afebrile with no leukocytosis.  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  Down to 1.3.  Resolved with hydration.  -Encourage oral hydration  - Continue holding diuretics  - Monitor renal function and electrolytes  - Nephrology following with recommendation      Hyponatremia: Likely secondary to diuretics.  Resolved with IV hydration.     Type 2 diabetes mellitus with hyperglycemia and Diabetic polyneuropathy: On insulin pump at home but currently on  basal bolus as per endo d/t brittle diabetes .  Endocrinology consulted to help manage    pAfib : resume eliquis       Active Problems:    HTN (hypertension), benign: BP stable.  Will monitor    Chronic diastolic heart failure (HCC): Appears euvolemic.  Diuretics on hold due to LILLIAN.  Will monitor    Hypothyroidism: On levothyroxine      (HCC)    Overweight (BMI 25.0-29.9)        Medications:  Reviewed  Infusion Medications    dextrose      sodium chloride       Scheduled Medications    oxyCODONE-acetaminophen  2 tablet Oral Once    bacitracin-polymyxin 
      Hospitalist Progress Note      PCP: Dikc Duggan MD    Date of Admission: 6/4/2025    LOS: 1    Chief Complaint:   Chief Complaint   Patient presents with    Wound Infection     Pt via self from home, c/o middle left toe infection, saw podiatrist today, is diabetic        Case Summary:   85-year-old gentleman with history of type 2 diabetes, hypertension, hyperlipidemia, stage III CKD, GERD, PAF, diastolic heart failure, aortic stenosis status post TAVR who presented from podiatry clinic with osteomyelitis of the left third digit complicated by acute kidney injury and hyponatremia        Principal Problem:    Osteomyelitis and Cellulitis, left foot third digit due to Diabetic foot infection: Seen podiatry this morning with plans for surgical intervention  - Continue empiric antibiotic coverage  - ID consult  - Pain management      LILLIAN (acute kidney injury): Creatinine slowly improving.  Peaked at 2.2.  Down to 1.8 today.  Nephrology consulted and following with recommendations.  - Monitor renal function and electrolytes  -Aldactone, Lasix and metolazone on hold      Hyponatremia: Likely secondary to diuretics.  Improved with hydration.  Will monitor      Active Problems:    HTN (hypertension), benign: BP stable.  Will monitor    Chronic diastolic heart failure (HCC): Appears euvolemic.  Diuretics on hold due to LILLIAN.  Will monitor    Hypothyroidism: On levothyroxine    Type 2 diabetes mellitus with hyperglycemia and Diabetic polyneuropathy: On insulin pump.  Endocrinology consulted to help manage    (HCC)    Overweight (BMI 25.0-29.9)        Medications:  Reviewed  Infusion Medications    dextrose      sodium chloride 100 mL/hr at 06/05/25 0632    sodium chloride       Scheduled Medications    Insulin Pump - Basal Dose   SubCUTAneous Daily    Insulin Pump - Bolus Dose   SubCUTAneous 4x Daily AC & HS    DAPTOmycin IV orderable  6 mg/kg (Adjusted) IntraVENous Q24H    naphazoline-pheniramine  1 drop Both Eyes 
    Clinical Pharmacy Note: Pharmacy to Dose Vancomycin    Vancomycin Day: 2  Indication: DFI / OM  Current Dose: intermittent dosing  Dosing Method: Dosing by random levels    Random: 13.5 mg/L    Recent Labs     06/04/25  1623 06/05/25  0449   BUN 40* 34*       Recent Labs     06/04/25  1623 06/05/25  0449   CREATININE 2.2* 1.8*       Recent Labs     06/04/25  1623 06/05/25  0449   WBC 14.5* 16.4*         Intake/Output Summary (Last 24 hours) at 6/5/2025 0713  Last data filed at 6/4/2025 2345  Gross per 24 hour   Intake --   Output 900 ml   Net -900 ml         Ht Readings from Last 1 Encounters:   06/05/25 1.778 m (5' 10\")        Wt Readings from Last 1 Encounters:   06/05/25 91 kg (200 lb 9.9 oz)         Body mass index is 28.79 kg/m².    Estimated Creatinine Clearance: 35 mL/min (A) (based on SCr of 1.8 mg/dL (H)).      Assessment/Plan:  Vancomycin level is therapeutic.  Patient has LILLIAN, will dose vancomycin intermittently for now  Will redose vancomycin 1250 mg one time today.   A vancomycin random level has been ordered on 6/6 at 0600 for follow-up.  Changes in regimen will be determined based on culture results, renal function, and clinical response.  Pharmacy will continue to monitor and adjust regimen as necessary.    Thank you for the consult,    Renata Garza, PharmD, BCPS  [id number]  6/5/2025 7:14 AM       
    Infectious Diseases   Progress Note      Admission Date: 6/4/2025  Hospital Day: Hospital Day: 3   Attending: Melissa Biggs MD  Date of service: 6/6/2025     Chief complaint/ Reason for consult:     Worsening bandemia  Complicated left diabetic foot infection involving the left third toe with osteomyelitis of the still phalanx on x-ray  Acute kidney injury with serum creatinine 1.8  Pacemaker in place   H/o aortic valvereplacement  Hyponatremia serum sodium 132  Longstanding type 2 diabetes mellitus    Microbiology:      I have reviewed allavailable micro lab data and cultures    Results       Procedure Component Value Units Date/Time    Culture, Wound (with Gram Stain) [[id number]]     Order Status: No result Specimen: Foot     Culture, Blood 1 [[id number]] Collected: 06/04/25 1624    Order Status: Completed Specimen: Blood Updated: 06/05/25 1715     Blood Culture, Routine No Growth to date.  Any change in status will be called.    Narrative:      ORDER#: [id number]                          ORDERED BY: ARCHIE SU  SOURCE: Blood right forearm                COLLECTED:  06/04/25 16:24  ANTIBIOTICS AT [facility].:                      RECEIVED :  06/04/25 20:56  If child <=2 yrs old please draw pediatric bottle.~Blood Culture 1    Culture, Blood 2 [[id number]] Collected: 06/04/25 1624    Order Status: Completed Specimen: Blood Updated: 06/05/25 1715     Culture, Blood 2 No Growth to date.  Any change in status will be called.    Narrative:      ORDER#: [id number]                          ORDERED BY: ARCHIE SU  SOURCE: Blood Antecubital-Rig              COLLECTED:  06/04/25 16:24  ANTIBIOTICS AT [facility].:                      RECEIVED :  06/04/25 20:56  If child <=2 yrs old please draw pediatric bottle.~Blood Culture #2             No results found for the last 90 days.         Antibiotics and immunizations:       Current antibiotics: All antibiotics and their doses were reviewed by me    Recent Abx Admin  
    Infectious Diseases   Progress Note      Admission Date: 6/4/2025  Hospital Day: Hospital Day: 6   Attending: Melissa Biggs MD  Date of service: 6/9/2025     Chief complaint/ Reason for consult:     Worsening bandemia  Complicated left diabetic foot infection involving the left third toe with osteomyelitis of the still phalanx on x-ray  Acute kidney injury with serum creatinine 1.8  Pacemaker in place   H/o aortic valvereplacement  Hyponatremia serum sodium 132  Longstanding type 2 diabetes mellitus    Microbiology:      I have reviewed allavailable micro lab data and cultures    Results       Procedure Component Value Units Date/Time    Culture, Wound (with Gram Stain) [[id number]]  (Abnormal)  (Susceptibility) Collected: 06/06/25 1630    Order Status: Completed Specimen: Wound from Foot Updated: 06/09/25 0658     Gram Stain Result 2+ Epithelial Cells  No WBC's seen  rare gram positive cocci       Organism Staphylococcus epidermidis     WOUND/ABSCESS Light growth    Narrative:      ORDER#: [id number]                          ORDERED BY: JUNIOR LANGFORD  SOURCE: Foot                               COLLECTED:  06/06/25 16:30  ANTIBIOTICS AT [facility].:                      RECEIVED :  06/06/25 18:33    Susceptibility        Staphylococcus epidermidis      BACTERIAL SUSCEPTIBILITY PANEL BY THI      clindamycin >=8 mcg/mL Resistant      erythromycin >=8 mcg/mL Resistant      levofloxacin <=0.12 mcg/mL Sensitive      linezolid 1 mcg/mL Sensitive      oxacillin <=0.25 mcg/mL Sensitive      tetracycline 2 mcg/mL Sensitive      trimethoprim-sulfamethoxazole <=10 mcg/mL Sensitive      vancomycin 1 mcg/mL Sensitive                           Culture, Wound (with Gram Stain) [[id number]]     Order Status: Canceled Specimen: Foot     Culture, Blood 1 [[id number]] Collected: 06/04/25 1624    Order Status: Completed Specimen: Blood Updated: 06/08/25 1715     Blood Culture, Routine No Growth after 4 days of incubation.    
    Infectious Diseases   Progress Note      Admission Date: 6/4/2025  Hospital Day: Hospital Day: 8   Attending: Isiah Richardson MD  Date of service: 6/11/2025     Chief complaint/ Reason for consult:     Worsening bandemia  Complicated left diabetic foot infection involving the left third toe with osteomyelitis of the still phalanx on x-ray  Acute kidney injury with serum creatinine 1.8  Pacemaker in place   H/o aortic valvereplacement  Hyponatremia serum sodium 132  Longstanding type 2 diabetes mellitus    Microbiology:      I have reviewed allavailable micro lab data and cultures    Results       Procedure Component Value Units Date/Time    Culture, Wound (with Gram Stain) [[id number]]  (Abnormal)  (Susceptibility) Collected: 06/06/25 1630    Order Status: Completed Specimen: Wound from Foot Updated: 06/09/25 0658     Gram Stain Result 2+ Epithelial Cells  No WBC's seen  rare gram positive cocci       Organism Staphylococcus epidermidis     WOUND/ABSCESS Light growth    Narrative:      ORDER#: [id number]                          ORDERED BY: JUNIOR LANGFORD  SOURCE: Foot                               COLLECTED:  06/06/25 16:30  ANTIBIOTICS AT [facility].:                      RECEIVED :  06/06/25 18:33    Susceptibility        Staphylococcus epidermidis      BACTERIAL SUSCEPTIBILITY PANEL BY THI      clindamycin >=8 mcg/mL Resistant      erythromycin >=8 mcg/mL Resistant      levofloxacin <=0.12 mcg/mL Sensitive      linezolid 1 mcg/mL Sensitive      oxacillin <=0.25 mcg/mL Sensitive      tetracycline 2 mcg/mL Sensitive      trimethoprim-sulfamethoxazole <=10 mcg/mL Sensitive      vancomycin 1 mcg/mL Sensitive                           Culture, Wound (with Gram Stain) [[id number]]     Order Status: Canceled Specimen: Foot     Culture, Blood 1 [[id number]] Collected: 06/04/25 1624    Order Status: Completed Specimen: Blood Updated: 06/08/25 1715     Blood Culture, Routine No Growth after 4 days of incubation.    
   MD Julio César Hankins MD Aldo Estella, DO                 Office: [phone]                      Fax: [phone]             [employer]                   NEPHROLOGY INPATIENT PROGRESS NOTE:     PATIENT NAME: Demetrio Teran  : 1940  MRN: [id number]       RECOMMENDATIONS:   Creatinine stable  Post ATN polyuria-better  Stopped maintenance IV fluid  Sodium now at goal  Endocrinology following for uncontrolled diabetes     Stopped Aldactone, Lasix, Metolazone   Holding home - Ibuprofen, avoid NSAIDs    D/C plan from renal stand point:-Stable for discharge from renal perspective once okay with other teams  - Will NOT need above meds, on d-c  -: follow up w/ us at  [facility] (in [email or web address] [facility]  in 2-3  weeks after d/c.        Thank you for allowing me to participate in this patient's care. Please do not hesitate to contact me anytime. We will follow along with you.       Timothy Singh MD,  [facility]  Office: [phone] or Via [employer]  Fax: [phone]      =======================================================================================      IMPRESSION:       Admitted on:  2025  3:44 PM   For:    Hospital Problems           Last Modified POA    * (Principal) Osteomyelitis (HCC) 2025 Yes    Benign prostatic hyperplasia with urinary frequency 2025 Yes    HTN (hypertension), benign 2025 Yes    Chronic diastolic heart failure (HCC) 2025 Yes    Hypothyroidism 2025 Yes    Type 2 diabetes mellitus with hyperglycemia (E11.65) 2025 Yes    Diabetic foot infection (HCC) 2025 Yes    Overweight (BMI 25.0-29.9) 2025 Yes    Hyponatremia 2025 Yes    Diabetic polyneuropathy associated with type 2 diabetes mellitus (HCC) 2025 Yes    Bandemia 2025 Yes    LILLIAN (acute kidney injury) 2025 Yes    Cellulitis of third toe of left foot 2025 Yes    Diabetes education, encounter for 
   MD Julio César Hankins MD Aldo Estella, DO                 Office: [phone]                      Fax: [phone]             [employer]                   NEPHROLOGY INPATIENT PROGRESS NOTE:     PATIENT NAME: Demetrio Teran  : 1940  MRN: [id number]       RECOMMENDATIONS:     Post ATN likely polyuria     cc/hr - cut down to 50 cc/hr  for 1 more day   Monitor for fluid overload a/ HEpEF hx.      Stopped Aldactone, Lasix, Metolazone   Holding home - Ibuprofen, avoid NSAIDs    Na goal <6-8 pints/24 hrs     Abx per 1' -ID-pharmacist team   ISS for BG control        no need for dialysis,     at higher risk for decompensation, needing closer monitoring.    D/C plan from renal stand point:- likely 1 more day , if LILLIAN continues to improve    - Will NOT need above meds, on d-c  -: follow up w/ us at  [facility] (in [place] [facility]),  in 2-3  weeks after d/c. (I updated our information in discharge follow up providers' list.)     Thank you for allowing me to participate in this patient's care. Please do not hesitate to contact me anytime. We will follow along with you.       Julio César Crow MD,  [facility]  Office: [phone] or Via [employer]  Fax: [phone]      =======================================================================================      IMPRESSION:       Admitted on:  2025  3:44 PM   For:    Hospital Problems           Last Modified POA    * (Principal) Osteomyelitis (HCC) 2025 Yes    Benign prostatic hyperplasia with urinary frequency 2025 Yes    HTN (hypertension), benign 2025 Yes    Chronic diastolic heart failure (HCC) 2025 Yes    Hypothyroidism 2025 Yes    Type 2 diabetes mellitus with hyperglycemia (E11.65) 2025 Yes    Diabetic foot infection (HCC) 2025 Yes    Overweight (BMI 25.0-29.9) 2025 Yes    Hyponatremia 2025 Yes    Diabetic polyneuropathy associated with type 2 
   MD Julio César Hankins MD Aldo Estella, DO                 Office: [phone]                      Fax: [phone]             [employer]                   NEPHROLOGY INPATIENT PROGRESS NOTE:     PATIENT NAME: Demetrio Teran  : 1940  MRN: [id number]       RECOMMENDATIONS:   Creatinine stable  Post ATN polyuria-better  Stopped maintenance IV fluid  Sodium now at goal  Endocrinology following for uncontrolled diabetes     Stopped Aldactone, Lasix, Metolazone   Holding home - Ibuprofen, avoid NSAIDs    D/C plan from renal stand point:-Stable for discharge from renal perspective once okay with other teams  - Will NOT need above meds, on d-c  -: follow up w/ us at  [facility] (in [employer] [facility]),  in 2-3  weeks after d/c.        Thank you for allowing me to participate in this patient's care. Please do not hesitate to contact me anytime. We will follow along with you.       Timothy Singh MD,  [facility]  Office: [phone] or Via [employer]  Fax: [phone]      =======================================================================================      IMPRESSION:       Admitted on:  2025  3:44 PM   For:    Hospital Problems           Last Modified POA    * (Principal) Osteomyelitis (HCC) 2025 Yes    Benign prostatic hyperplasia with urinary frequency 2025 Yes    HTN (hypertension), benign 2025 Yes    Chronic diastolic heart failure (HCC) 2025 Yes    Hypothyroidism 2025 Yes    Type 2 diabetes mellitus with hyperglycemia (E11.65) 2025 Yes    Diabetic foot infection (HCC) 2025 Yes    Overweight (BMI 25.0-29.9) 2025 Yes    Hyponatremia 2025 Yes    Diabetic polyneuropathy associated with type 2 diabetes mellitus (HCC) 2025 Yes    Bandemia 2025 Yes    LILLIAN (acute kidney injury) 2025 Yes    Cellulitis of third toe of left foot 2025 Yes    Diabetes education, encounter for 
  [facility] - Inpatient Rehabilitation Department   Phone: [phone]    Physical Therapy    [x] Initial Evaluation            [] Daily Treatment Note         [] Discharge Summary      Patient: Demetrio Teran   : 1940   MRN: [id number]   Date of Service:  2025  Admitting Diagnosis: Osteomyelitis (HCC)  Current Admission Summary: 84 yo male admitted to [facility] on  for redness/swelling in (L) third toe. The patient has been following podiatry as an outpatient and they sent him in due to concerns for infection. Found to have (L) 3rd toe diabetic foot ulcer with osteomyelitis, hyponatremia, and LILLIAN. His case is complicated by hx of DM T2 with peripheral neuropathy.   Past Medical History:  has a past medical history of Arthritis, Cancer (HCC), Cerebrovascular accident (CVA) (HCC), Chronic kidney disease, Diabetes mellitus (HCC), GERD (gastroesophageal reflux disease), Glaucoma, Hyperlipidemia, Hypertension, Irregular heart beat, Psychiatric problem, Reflux, Thyroid disease, Ulcerative colitis (HCC), and Vitamin D deficiency.  Past Surgical History:  has a past surgical history that includes Prostate surgery; Cataract removal; Knee arthroscopy (1973); eye surgery; Colonoscopy; joint replacement (2013); Hemorrhoid surgery; retinal laser; knee surgery (Left, 4539-3344); knee surgery (Left, ); and Cholecystectomy, laparoscopic (2018).    Discharge Recommendations: Demetrio Teran scored a  on the AM-PAC short mobility form.  At this time, no further PT is recommended upon discharge due to pt currently functioning near his baseline.  Recommend patient returns to prior setting with prior services.      Will continue to assess pending potential WBing status change after planned (L) foot surgery on Monday.     DME Required For Discharge: DME to be determined pending patient progress    Precautions/Restrictions: high fall risk, 5 carb diet  Weight Bearing Restrictions: no 
  [facility] - Inpatient Rehabilitation Department   Phone: [phone]    Occupational Therapy    [x] Initial Evaluation            [] Daily Treatment Note         [] Discharge Summary      Patient: Demetrio Teran   : 1940   MRN: [id number]   Date of Service:  2025    Admitting Diagnosis:  Osteomyelitis (HCC)  Current Admission Summary: 84 yo male admitted to [facility] on  for redness/swelling in (L) third toe. The patient has been following podiatry as an outpatient and they sent him in due to concerns for infection. Found to have (L) 3rd toe diabetic foot ulcer with osteomyelitis, hyponatremia, and LILLIAN. His case is complicated by hx of DM T2 with peripheral neuropathy.   Past Medical History:  has a past medical history of Arthritis, Cancer (HCC), Cerebrovascular accident (CVA) (HCC), Chronic kidney disease, Diabetes mellitus (HCC), GERD (gastroesophageal reflux disease), Glaucoma, Hyperlipidemia, Hypertension, Irregular heart beat, Psychiatric problem, Reflux, Thyroid disease, Ulcerative colitis (HCC), and Vitamin D deficiency.  Past Surgical History:  has a past surgical history that includes Prostate surgery; Cataract removal; Knee arthroscopy (1973); eye surgery; Colonoscopy; joint replacement (2013); Hemorrhoid surgery; retinal laser; knee surgery (Left, 1062-0194); knee surgery (Left, ); and Cholecystectomy, laparoscopic (2018).    Discharge Recommendations: Demetrio Teran scored a  on the AM-PAC ADL Inpatient form.  At this time, no further OT is recommended upon discharge due to pt near baseline.  Recommend patient returns to prior setting with prior services.      Will require re-evaluation after podiatric intervention       DME Required For Discharge: DME to be determined pending patient progress    Precautions/Restrictions: high fall risk  Weight Bearing Restrictions: weight bearing as tolerated   [x] Left Lower Extremity     Required 
  [facility] - Inpatient Rehabilitation Department   Phone: [phone]    Occupational Therapy    [] Initial Evaluation            [x] Daily Treatment Note         [] Discharge Summary      Patient: Demetrio Teran   : 1940   MRN: [id number]   Date of Service:  6/10/2025    Admitting Diagnosis:  Osteomyelitis (HCC)    Current Admission Summary: 84 yo male admitted to [facility] on  for redness/swelling in (L) third toe. The patient has been following podiatry as an outpatient and they sent him in due to concerns for infection. Found to have (L) 3rd toe diabetic foot ulcer with osteomyelitis, hyponatremia, and LILLIAN. His case is complicated by hx of DM T2 with peripheral neuropathy.   Past Medical History:  has a past medical history of Arthritis, Cancer (HCC), Cerebrovascular accident (CVA) (HCC), Chronic kidney disease, Diabetes mellitus (HCC), GERD (gastroesophageal reflux disease), Glaucoma, Hyperlipidemia, Hypertension, Irregular heart beat, Psychiatric problem, Reflux, Thyroid disease, Ulcerative colitis (HCC), and Vitamin D deficiency.  Past Surgical History:  has a past surgical history that includes Prostate surgery; Cataract removal; Knee arthroscopy (1973); eye surgery; Colonoscopy; joint replacement (2013); Hemorrhoid surgery; retinal laser; knee surgery (Left, 3832-9517); knee surgery (Left, ); Cholecystectomy, laparoscopic (2018); Cardiac pacemaker placement (Left, ); Mitral valve replacement; and Toe amputation (Left, 2025).        Date: 25 [place] / [place] [facility] [place] [address] [place] [address]     Anesthesia Start: 1207 Anesthesia Stop: 1245     Procedure: TOTAL DIGIT AMPUTATION LEFT THIRD TOE (Left: Foot) Diagnosis:       Osteomyelitis of third toe of left foot (HCC)      (Osteomyelitis of third toe of left foot (HCC) [M86.9])     Surgeons: Hernan Goldberg DPM Responsible Provider: Luis Martinez MD     Anesthesia Type: MAC ASA 
  Nephrology consult received - full consult note to follow.   Thank you for allowing us to participate in this patient’s care. Please do not hesitate to contact, if any questions or concerns. We will follow along with you.     Julio César Crow MD  [facility]   [phone]   Or Via [employer]. 
4 Eyes Skin Assessment     NAME:  Demetrio Teran  YOB: 1940  MEDICAL RECORD NUMBER:  [id number]    The patient is being assessed for  Admission    I agree that at least one RN has performed a thorough Head to Toe Skin Assessment on the patient. ALL assessment sites listed below have been assessed.      Areas assessed by both nurses:    Head, Face, Ears, Shoulders, Back, Chest, Arms, Elbows, Hands, Sacrum. Buttock, Coccyx, Ischium, and Legs. Feet and Heels        Does the Patient have a Wound? Yes wound(s) were present on assessment. LDA wound assessment was Initiated and completed by RN       Emile Prevention initiated by RN: Yes  Wound Care Orders initiated by RN: Yes    Pressure Injury (Stage 3,4, Unstageable, DTI, NWPT, and Complex wounds) if present, place Wound referral order by RN under : Yes    New Ostomies, if present place, Ostomy referral order under : No     Nurse 1 eSignature: Electronically signed by Doris Omalley RN on 6/4/25 at 7:45 PM EDT    **SHARE this note so that the co-signing nurse can place an eSignature**    Nurse 2 eSignature: Electronically signed by Beulah Hunter RN on 6/5/25 at 4:49 AM EDT    
Attending notified of blood sugar at 103 response is go ahead and give scheduled perennial dose of humalog and lantus  
Attending notified of blood sugar at 90 response is decreasing lantus order to 26 units  
Consent not obtained. Pt would like to speak with podiatry before signing consent.  
Endocrinology    Patient doing well afteramputation of third toe on the left.  Insulin requirements seem much less but patient is not eating.  Blood sugars after only 24 units in insulin this morning are 187, 266, 77, 148 mg/dl.    Pulse 62  /68 Resp 16 Temp 97.5 deg F  HEENT: EOM's intact  Neck Not stiff  Chest: Clear  Heart: S1 and S2  Abd: Nontender  Ext: Left leg bandaged, walker at bedside    Assessment:  Type 2 IDDM: Decreased insulin requirements today but likely to increase again when he is eating more. Removal of infection may also lead to prompt improvement in blood sugars.  Albumin 2.9, likely indicates minor malnutrition and MetSLD (fatty liver).  3. Serum creatinine 1.3 eGFR 54    Plan:  Insulin tonight 14 units Lantus; AM dose try 30 units.  Decrease meal time insulin to 15 units Lis pro at each meal.  May be able to return to V-Go 40 soon if course continues favorable  4. Appointment in office in about 7 days; family has my office number; discussed with family and patient this evening. .    GINA Clark M.D.  
Endocrinology    Patient had normal to mild hypoglycemia overnight, down to blood sugar 68 mg/dl; but mild hyperglyemia since having a sandwich last night. He had a correction dose of 15 units lis pro last night as well.    Temp 97.8 deg F Resp 14 Pulse 63 /59     3rd lainez left foot appears more necrotic (black) inferiorly.    Assessment:  Osteomyelitis and cellulitis of 3rd toe left foot, suspect dry gangrene inferiorly.  Type 2 IDDM still uncontrolled during the day, needs slightly less lantus in PM and slightly more in AM.  Will give usual half Lantus dose pre op.    Plan:  Decrease PM lantus dose from 45 to 42 units.  Give 24 units Lantus in AM instead of 48 units it seems he needs for a usual AM Lantus dose.  Increase mealtime insulin slightly from 20 to 25 units with meals.    GINA Clark M.D.  
Endocrinology    Patient's blood sugars were better with sliding scale added to his V-Go and 20 units Lantus every 12 hours together with the 40 units over 24 hours from his V-Go, blood sugar down to about 153 mg/dl at 17:31; however, his V-GO ran out and blood sugar promptly increased to 279 mg/dl by 20:03.  Discussed with patient and his R.N. who prefer to have one method of insulin administration, as usually done on hospital rice with basal and bolus insulin.  His renal function is improving, serum creat down to 1.4 mg/dl and likely he will need slightly more insulin on this accord; however, his infection seems to be slightly better controlled with broad spectrum IV antibiotics, which would tend to decrease insuliln requirements.    P 72 Resp 18 Temp 98.1 deg F /73     Erythema on left middle toe improving somewhat, still looks necrotic inferiorly.    Assessment:  Osteomyelitis of 3 rd toe left foot but cellusitis adjacent improving somewhat; believe amputation of this 3rd left toe is still best approach if podiatry and ID continue to agree.  Type 2 uncontrolled IDDM, hopefully can do better with usual basal and bolus approach.  Hyponatremia, largely pseudohyponatremia with high blood sugar, resolving.  Renal insufficiency, probably ATN but improving, serum creat 1.4  e gFR 50 ml/min /1.7 meter sq so stage 3A.    Plan:  Discontinue V-Go insulin pump  Lantus insulin 45 units every 12 hours  One STAT lis pro dose of 15 units now  Insulin lis pro 20 units at each meal.  Continue sliding scale but adjust to high sliding scale.  6. Patient has cGM, risk of severe hypoglyemia is low with alarm functioning and on hospital rice.    GINA Clark M.D.  Call me please  for questions or further adjustments:   [phone]   
Occupational Therapy  Demetrio Teran    Patient on hold this date due to surgery this date on 3rd toe left foot.  Will continue to follow and evaluate after surgical intervention.    Thank you,  Eun Yi, OTR/L [id number]  
Occupational Therapy  Pt states he does not need occupational therapy services, and feels that he is functioning at baseline. He requested to be discharged from therapy caseload. Pt discharged at this time. Please reorder if pt status changes.     Thank you,  MELVIN Mccarthy OTR/L [id number]    
PM assessment complete and documented. Meds given per MAR. External cath in place. Pt denies needs or concerns at present. Call light in reach.  
Patient discharged home with family member, IVs taken out, patient transported out to car in a wheelchair with all belongings  
Patient returned from PACU. Patient awake and alert. Vital signs stable, SPO2 95% on room air. Patient denies pain at this time. Fall precautions in place. Call light and bedside table within reach.  Will continue to monitor.     
Patient transferred from  to room 3326 in stable condition. Patient is alert and oriented. Vital signs WNL, respirations easy and unlabored. Patient incontinent of urine. Fanta care completed. Bed pad and diaper changed. Patient resting in bed.  
Patients head to toe assessment completed. Vital signs WNL. Bed alarm engaged, call light within reach. Scheduled medications given per MAR. Patient denies any pain at the moment. Patient resting in bed.   
Per md, ok to keep patient until tomorrow, in order to have home care set up.   
Perfect serve sent to Dr. Goldberg regarding Brillinta. New order to hold for surgery.   
Physical Therapy  Demetrio Teran    Patient in bed on arrival, eating breakfast. Patient states he feels he does not need physical therapy intervention, and would prefer to be discharged from caseload. Patient states he has been moving around well and is not having pain in his foot. Will discharge patient per his request, see note written 6/10 for all objective information required for patient.     Thanks,   Keira Hannon PT, DPT #[id number] 6/11/2025       
Physical Therapy  Demetrio Teran  1940    Podiatry is planning for partial vs full 3rd digit amputation this date. Will hold PT and complete re-evaluation after surgical intervention.     Yamilex Galarza, PT, DPT [id number]        
Physical Therapy  Pt kindly declined PT this morning. Will re-attempt as schedule permits.   Thanks, Jo-Ann Quintana PT, DPT [id number]    
Podiatric Surgery Daily Progress Note  Demetrio Teran      Subjective :   Patient seen and examined this am at the bedside. Mr Teran states that he is regaining feeling to his left foot however he denies any significant pain at present. Pt states that PT has been going well and he has been able to comply with is NWB status to his LLE without any issues of instability or falls. Patient denies any acute overnight events. Patient denies N/V/F/C/SOB. Patient denies calf pain, thigh pain, chest pain.     Review of Systems: A 12 point review of symptoms is unremarkable with the exception of the chief complaint. Patient specifically denies nausea, fever, vomiting, chills, shortness of breath, chest pain, abdominal pain, constipation or difficulty urinating.       Objective     /63   Pulse 62   Temp 97.8 °F (36.6 °C) (Oral)   Resp 16   Ht 1.778 m (5' 10\")   Wt 93.7 kg (206 lb 9.6 oz)   SpO2 95%   BMI 29.64 kg/m²      I/O:  Intake/Output Summary (Last 24 hours) at 6/11/2025 1105  Last data filed at 6/11/2025 0533  Gross per 24 hour   Intake 941.54 ml   Output 500 ml   Net 441.54 ml              Wt Readings from Last 3 Encounters:   06/11/25 93.7 kg (206 lb 9.6 oz)   05/21/25 92.9 kg (204 lb 12.8 oz)   11/21/24 95.7 kg (211 lb)       LABS:    Recent Labs     06/09/25  0518   WBC 9.2   HGB 13.6   HCT 40.1*           Recent Labs     06/11/25  0642      K 3.7      CO2 21   PHOS 3.6   BUN 26*   CREATININE 1.3        Recent Labs     06/09/25  0518   INR 1.06           LOWER EXTREMITY EXAMINATION    Dressing to left lower extremity left clean, dry, and intact.  No strikethrough noted to external dressing.    CFT brisk to the remaining digits bilateral.  Sensation at baseline to remaining digits bilateral.  No pain with calf compression bilateral.  Patient able to perform active range of motion to digits bilateral.        IMAGING:  XR left foot (06/09/2025)  IMPRESSION:  Status post 3rd toe 
Pre-Operative Note  Resident Note     Patient: Demetrio Teran      Procedure: Partial versus total digit amputation left 3rd digit     Consent: In chart     Labs:        Recent Labs     06/08/25  0547 06/09/25 0518   WBC 10.4 9.2   HGB 14.1 13.6   HCT 40.7 40.1*   MCV 83.4 84.4    156        Recent Labs     06/08/25  0547 06/09/25  0518   * 137   K 3.8 3.6    105   CO2 21 21   PHOS 3.5 3.6   BUN 22* 22*   CREATININE 1.3 1.3      No results for input(s): \"AST\", \"ALT\", \"BILIDIR\", \"BILITOT\", \"ALKPHOS\" in the last 72 hours.    Invalid input(s): \"ALB\"   No results for input(s): \"LIPASE\", \"AMYLASE\" in the last 72 hours.     Recent Labs     06/09/25 0518   INR 1.06      No results for input(s): \"CKTOTAL\", \"CKMB\", \"CKMBINDEX\", \"TROPONINI\" in the last 72 hours.    Saline lock/IV access: Yes    Clearance for surgery: Yes per medicine     Diet: NPO at midnight     CXR: normal      EKG: Obtained 6/7/2025; see cardiology section for detailed report     Echocardiogram: Ordered     PT/INR: 14.0/1.06    IVF: NS     Abx: Scheduled Cipro, Dapto     Type and Screen:   Not indicated, Hg 13.6     Known risk factors for perioperative complications: T2DM, hypothyroidism, Chronic diastolic heart failure, hx CVA, Nonrehumatic aortic valve stenosis.     Anesthesia to see patient    Florian Montelongo DPM, MS    Podiatric Resident, PGY-1   [employer]   Pager Number: [phone]  6/9/2025     
Pt admitted to PACU via stretcher from the OR. Report received, assessment complete. VSS. Pt denies c/o pain or discomfort at this time. Dressing clean, dry and intact to left lower ext, no drainage noted.  
Pt asking for increase in refresh eye drop frequency. Perfect serve sent to Dr. Biggs  
Pt transported via bed to room 3324.   
Pt's blood sugar at 0505 was 70. Gave pt a turkey sandwich. Will recheck and monitor BS shortly.  
Report called to Genna AVENDANO on 3A.   
Report given to ROMANA Baltazar all questions answered at this time. Pt being transported to room 3324 shortly. Will monitor.   
Shift assessment completed, see flowsheets.  Medications administered, see MAR. Vital signs stable.  Plan of care discussed with patient. Fall precautions in place. Call light and bedside table within reach.  Pt denies further needs at this time.  Will continue to monitor.  Genna Daly RN     
Shift assessment completed, see flowsheets.  Medications administered, see MAR. Vital signs stable. No complaints of pain.  Plan of care discussed with patient. Fall precautions in place. Call light and bedside table within reach.  Pt denies further needs at this time.  Will continue to monitor.  Genna Daly RN     
Shift assessment completed, see flowsheets.  Medications administered, see MAR. Vital signs stable. Plan of care discussed with patient. Fall precautions in place. Call light and bedside table within reach.  Pt denies further needs at this time.  Will continue to monitor.  Genna Daly RN     
Teaching / education initiated regarding perioperative experience, expectations, and pain management during stay. Patient verbalized understanding.   
Narrative:      ORDER#: [id number]                          ORDERED BY: ARCHIE SU  SOURCE: Blood right forearm                COLLECTED:  06/04/25 16:24  ANTIBIOTICS AT [facility]ISE.:                      RECEIVED :  06/04/25 20:56  If child <=2 yrs old please draw pediatric bottle.~Blood Culture 1    Culture, Blood 2 [[id number]] Collected: 06/04/25 1624    Order Status: Completed Specimen: Blood Updated: 06/08/25 1715     Culture, Blood 2 No Growth after 4 days of incubation.    Narrative:      ORDER#: [id number]                          ORDERED BY: ARCHIE SU  SOURCE: Blood Antecubital-Rig              COLLECTED:  06/04/25 16:24  ANTIBIOTICS AT DENISE.:                      RECEIVED :  06/04/25 20:56  If child <=2 yrs old please draw pediatric bottle.~Blood Culture #2             Susceptibility data from last 90 days.  Collected Specimen Info Organism Clindamycin Erythromycin Levofloxacin Linezolid Oxacillin Sodium Tetracycline Trimethoprim + Sulfamethoxazole   06/06/25 Wound from Foot Staphylococcus epidermidis  R  R  S  S  S  S  S  S          Antibiotics and immunizations:       Current antibiotics: All antibiotics and their doses were reviewed by me    Recent Abx Admin                     DAPTOmycin (CUBICIN) 480 mg in sodium chloride 0.9 % 50 mL IVPB (mg) 480 mg New Bag 06/10/25 1151                      Immunization History: All immunization history was reviewed by me today.    Immunization History   Administered Date(s) Administered    COVID-19, MODERNA BLUE border, Primary or Immunocompromised, (age 12y+), IM, 100 mcg/0.5mL 08/04/2022    COVID-19, PFIZER PURPLE top, DILUTE for use, (age 12 y+), 30mcg/0.3mL 02/03/2021, 02/24/2021, 10/19/2021    COVID-19, PFIZER, 2024/25, (age 12y+), IM, 30mcg/0.3mL 10/24/2024    Influenza Virus Vaccine 12/10/2008, 12/27/2012    Influenza Whole 12/27/2012    Influenza, FLUCELVAX, (age 6 mo+), MDCK, Quadv MDV, 0.5mL 09/17/2019    Influenza, FLUZONE High Dose (age 65 y+), IM, 
06/09/25  0518   WBC 9.2   HGB 13.6   HCT 40.1*      INR 1.06      Recent Labs     06/09/25  0518 06/10/25  0620 06/11/25  0642    135* 136   K 3.6 4.2 3.7    105 104   CO2 21 19* 21   BUN 22* 22* 26*   CREATININE 1.3 1.3 1.3   CALCIUM 8.4 8.6 8.4   PHOS 3.6 3.3 3.6     No results for input(s): \"CKTOTAL\", \"TROPONINI\" in the last 72 hours.      Urinalysis:    Lab Results   Component Value Date/Time    NITRU Negative 06/05/2025 08:17 AM    WBCUA 0 06/05/2025 08:17 AM    BACTERIA None Seen 06/05/2025 08:17 AM    RBCUA 3 06/05/2025 08:17 AM    RBCUA NEGATIVE 08/18/2014 08:20 AM    BLOODU Negative 06/05/2025 08:17 AM    SPECGRAV >=1.030 12/28/2023 04:01 PM    GLUCOSEU Negative 06/05/2025 08:17 AM       Radiology:  XR FOOT LEFT (MIN 3 VIEWS)   Final Result   Status post 3rd toe amputation at the level of the metatarsophalangeal joint         XR CHEST (2 VW)   Final Result   Stable radiographic examination of the chest with no acute cardiopulmonary   abnormality identified.         XR TOE LEFT (MIN 2 VIEWS)   Final Result   1. Subtle cortical irregularity involving the 3rd distal tuft can be seen in   setting of osteomyelitis.             Isiah Richardson MD      Please excuse brevity and/or typos. This report was transcribed using voice recognition software. Every effort was made to ensure accuracy, however, inadvertent computerized transcription errors may be present.        
10.8   HGB 15.4 14.1 14.2   HCT 45.5 41.8 41.2    156 155      Recent Labs     06/04/25  1623 06/04/25  1921 06/05/25  0449 06/06/25  0631 06/07/25  0554   *   < > 132* 134* 135*   K 3.7  --  3.8 3.7 3.6   CL 95*  --  100 102 104   CO2 20*  --  22 21 21   BUN 40*  --  34* 28* 23*   CREATININE 2.2*  --  1.8* 1.6* 1.4*   CALCIUM 9.2  --  8.3 8.0* 8.4   PHOS  --   --  2.8 2.7 3.0   AST 26  --   --   --   --    ALT 27  --   --   --   --    BILITOT 1.2*  --   --   --   --    ALKPHOS 96  --   --   --   --     < > = values in this interval not displayed.     Recent Labs     06/05/25 0449   CKTOTAL 92       Urinalysis:    Lab Results   Component Value Date/Time    NITRU Negative 06/05/2025 08:17 AM    WBCUA 0 06/05/2025 08:17 AM    BACTERIA None Seen 06/05/2025 08:17 AM    RBCUA 3 06/05/2025 08:17 AM    RBCUA NEGATIVE 08/18/2014 08:20 AM    BLOODU Negative 06/05/2025 08:17 AM    SPECGRAV >=1.030 12/28/2023 04:01 PM    GLUCOSEU Negative 06/05/2025 08:17 AM       Radiology:  XR CHEST (2 VW)   Final Result   Stable radiographic examination of the chest with no acute cardiopulmonary   abnormality identified.         XR TOE LEFT (MIN 2 VIEWS)   Final Result   1. Subtle cortical irregularity involving the 3rd distal tuft can be seen in   setting of osteomyelitis.             Melissa Biggs MD      Please excuse brevity and/or typos. This report was transcribed using voice recognition software. Every effort was made to ensure accuracy, however, inadvertent computerized transcription errors may be present.        
100 102   CO2 20*  --  22 21   BUN 40*  --  34* 28*   CREATININE 2.2*  --  1.8* 1.6*   CALCIUM 9.2  --  8.3 8.0*   PHOS  --   --  2.8 2.7   AST 26  --   --   --    ALT 27  --   --   --    BILITOT 1.2*  --   --   --    ALKPHOS 96  --   --   --      Recent Labs     06/05/25  0449   CKTOTAL 92       Urinalysis:    Lab Results   Component Value Date/Time    NITRU Negative 06/05/2025 08:17 AM    WBCUA 0 06/05/2025 08:17 AM    BACTERIA None Seen 06/05/2025 08:17 AM    RBCUA 3 06/05/2025 08:17 AM    RBCUA NEGATIVE 08/18/2014 08:20 AM    BLOODU Negative 06/05/2025 08:17 AM    SPECGRAV >=1.030 12/28/2023 04:01 PM    GLUCOSEU Negative 06/05/2025 08:17 AM       Radiology:  XR CHEST (2 VW)   Final Result   Stable radiographic examination of the chest with no acute cardiopulmonary   abnormality identified.         XR TOE LEFT (MIN 2 VIEWS)   Final Result   1. Subtle cortical irregularity involving the 3rd distal tuft can be seen in   setting of osteomyelitis.             Melissa Biggs MD      Please excuse brevity and/or typos. This report was transcribed using voice recognition software. Every effort was made to ensure accuracy, however, inadvertent computerized transcription errors may be present.        
9.2   HGB 14.2 14.1 13.6   HCT 41.2 40.7 40.1*    156 156   INR  --   --  1.06      Recent Labs     06/07/25  0554 06/08/25  0547 06/09/25  0518   * 135* 137   K 3.6 3.8 3.6    103 105   CO2 21 21 21   BUN 23* 22* 22*   CREATININE 1.4* 1.3 1.3   CALCIUM 8.4 8.5 8.4   PHOS 3.0 3.5 3.6     No results for input(s): \"CKTOTAL\", \"TROPONINI\" in the last 72 hours.      Urinalysis:    Lab Results   Component Value Date/Time    NITRU Negative 06/05/2025 08:17 AM    WBCUA 0 06/05/2025 08:17 AM    BACTERIA None Seen 06/05/2025 08:17 AM    RBCUA 3 06/05/2025 08:17 AM    RBCUA NEGATIVE 08/18/2014 08:20 AM    BLOODU Negative 06/05/2025 08:17 AM    SPECGRAV >=1.030 12/28/2023 04:01 PM    GLUCOSEU Negative 06/05/2025 08:17 AM       Radiology:  XR CHEST (2 VW)   Final Result   Stable radiographic examination of the chest with no acute cardiopulmonary   abnormality identified.         XR TOE LEFT (MIN 2 VIEWS)   Final Result   1. Subtle cortical irregularity involving the 3rd distal tuft can be seen in   setting of osteomyelitis.             Melissa Biggs MD      Please excuse brevity and/or typos. This report was transcribed using voice recognition software. Every effort was made to ensure accuracy, however, inadvertent computerized transcription errors may be present.        
NEGATIVE 08/18/2014 08:20 AM    BLOODU Negative 06/05/2025 08:17 AM    SPECGRAV >=1.030 12/28/2023 04:01 PM    GLUCOSEU Negative 06/05/2025 08:17 AM       Radiology:  XR CHEST (2 VW)   Final Result   Stable radiographic examination of the chest with no acute cardiopulmonary   abnormality identified.         XR TOE LEFT (MIN 2 VIEWS)   Final Result   1. Subtle cortical irregularity involving the 3rd distal tuft can be seen in   setting of osteomyelitis.             Melissa Biggs MD      Please excuse brevity and/or typos. This report was transcribed using voice recognition software. Every effort was made to ensure accuracy, however, inadvertent computerized transcription errors may be present.        
intervention for source control of osteomyelitis during this admission, surgery pending OR and surgeon availability likely MONDAY. Nursing communications placed for betadine application over the weekend.    Discussed assessment and plan with Dr. Hernan Goldberg DPM.    Helen Keane DPM   Chief Podiatric Resident PGY3  Pager [phone] or Archie  6/6/2025, 7:33 AM      
program    Goals  Patient Goals: Go home sooner  Short Term Goals:  Time Frame: Before discharge  Patient will complete bed mobility at Independent Goal met 6/10/2025    Patient will complete transfers at Independent   Patient will ambulate 600 ft with use of no device at Independent  Patient will ascend/descend 1 stairs without use of HR at Independent  Patient to maintain standing at Independent for 5 minutes.    Above goals reviewed on 6/10/2025.  All goals are ongoing at this time unless indicated above.      Therapy Session Time      Individual Group Co-treatment   Time In     0832   Time Out     0855   Minutes     23     Timed Code Treatment Minutes:  23 Minutes  Total Treatment Minutes:  23 Minutes       Electronically Signed By: Keira Hannon, PT  Keira Hannon PT, DPT #[id number] 6/10/2025             
102 104 103   CO2 21 21 21   BUN 28* 23* 22*   CREATININE 1.6* 1.4* 1.3     Recent Labs     06/06/25  0631 06/07/25  0554 06/08/25  0547   CALCIUM 8.0* 8.4 8.5   MG  --  2.10  --    PHOS 2.7 3.0 3.5     No results for input(s): \"PH\", \"PCO2\", \"PO2\" in the last 72 hours.    Invalid input(s): \"[id number]\", \"INSPIREDO2\"    ABG:  No results found for: \"PH\", \"PCO2\", \"PO2\", \"HCO3\", \"BE\", \"THGB\", \"TCO2\", \"O2SAT\"  VBG:    Lab Results   Component Value Date/Time    PHVEN 7.447 06/04/2025 04:24 PM    ANY6ORV 30.5 06/04/2025 04:24 PM    BEVEN -1.7 06/04/2025 04:24 PM    U3ANNEIL 99 06/04/2025 04:24 PM       LDH:  No results found for: \"LDH\"  Uric Acid:    Lab Results   Component Value Date/Time    URICACID 7.5 11/16/2016 04:00 PM       PT/INR:    Lab Results   Component Value Date/Time    PROTIME 14.1 09/15/2022 02:30 PM    INR 1.10 09/15/2022 02:30 PM     Warfarin PT/INR:  No components found for: \"PTPATWAR\", \"PTINRWAR\"  PTT:    Lab Results   Component Value Date/Time    APTT 32.6 09/15/2022 02:30 PM   [APTT}  Last 3 Troponin:    Lab Results   Component Value Date/Time    TROPONINI 0.02 02/02/2023 03:22 PM    TROPONINI 0.02 02/02/2023 12:10 PM    TROPONINI 0.02 09/15/2022 02:30 PM       U/A:    Lab Results   Component Value Date/Time    NITRITE Negative 12/28/2023 04:01 PM    COLORU Yellow 06/05/2025 08:17 AM    PROTEINU 30 06/05/2025 08:17 AM    PHUR 5.5 06/05/2025 08:17 AM    PHUR 5.5 12/28/2023 04:01 PM    PHUR 5.0 05/10/2023 01:35 PM    WBCUA 0 06/05/2025 08:17 AM    RBCUA 3 06/05/2025 08:17 AM    RBCUA NEGATIVE 08/18/2014 08:20 AM    MUCUS PRESENT 08/18/2014 08:20 AM    BACTERIA None Seen 06/05/2025 08:17 AM    CLARITYU Clear 06/05/2025 08:17 AM    SPECGRAV >=1.030 12/28/2023 04:01 PM    LEUKOCYTESUR Negative 06/05/2025 08:17 AM    UROBILINOGEN 0.2 06/05/2025 08:17 AM    BILIRUBINUR Negative 06/05/2025 08:17 AM    BLOODU Negative 06/05/2025 08:17 AM    GLUCOSEU Negative 06/05/2025 08:17 AM     Microalbumen/Creatinine 
Saturation:  No components found for: \"PERCENTFE\"  FERRITIN:  No results found for: \"FERRITIN\"  IRON:  No results found for: \"IRON\"  TIBC:  No results found for: \"TIBC\"    Recent Labs     06/07/25  0554 06/08/25  0547 06/09/25  0518   * 135* 137   K 3.6 3.8 3.6    103 105   CO2 21 21 21   BUN 23* 22* 22*   CREATININE 1.4* 1.3 1.3     Recent Labs     06/07/25  0554 06/08/25  0547 06/09/25  0518   CALCIUM 8.4 8.5 8.4   MG 2.10  --  2.17   PHOS 3.0 3.5 3.6     No results for input(s): \"PH\", \"PCO2\", \"PO2\" in the last 72 hours.    Invalid input(s): \"O3EARCPUFYPE\", \"INSPIREDO2\"    ABG:  No results found for: \"PH\", \"PCO2\", \"PO2\", \"HCO3\", \"BE\", \"THGB\", \"TCO2\", \"O2SAT\"  VBG:    Lab Results   Component Value Date/Time    PHVEN 7.447 06/04/2025 04:24 PM    VMB0FXK 30.5 06/04/2025 04:24 PM    BEVEN -1.7 06/04/2025 04:24 PM    V3VIRRGW 99 06/04/2025 04:24 PM       LDH:  No results found for: \"LDH\"  Uric Acid:    Lab Results   Component Value Date/Time    URICACID 7.5 11/16/2016 04:00 PM       PT/INR:    Lab Results   Component Value Date/Time    PROTIME 14.0 06/09/2025 05:18 AM    INR 1.06 06/09/2025 05:18 AM     Warfarin PT/INR:  No components found for: \"PTPATWAR\", \"PTINRWAR\"  PTT:    Lab Results   Component Value Date/Time    APTT 32.6 09/15/2022 02:30 PM   [APTT}  Last 3 Troponin:    Lab Results   Component Value Date/Time    TROPONINI 0.02 02/02/2023 03:22 PM    TROPONINI 0.02 02/02/2023 12:10 PM    TROPONINI 0.02 09/15/2022 02:30 PM       U/A:    Lab Results   Component Value Date/Time    NITRITE Negative 12/28/2023 04:01 PM    COLORU Yellow 06/05/2025 08:17 AM    PROTEINU 30 06/05/2025 08:17 AM    PHUR 5.5 06/05/2025 08:17 AM    PHUR 5.5 12/28/2023 04:01 PM    PHUR 5.0 05/10/2023 01:35 PM    WBCUA 0 06/05/2025 08:17 AM    RBCUA 3 06/05/2025 08:17 AM    RBCUA NEGATIVE 08/18/2014 08:20 AM    MUCUS PRESENT 08/18/2014 08:20 AM    BACTERIA None Seen 06/05/2025 08:17 AM    CLARITYU Clear 06/05/2025 08:17 AM    
effusion. Bones/Soft tissues: Nothing acute.     Stable radiographic examination of the chest with no acute cardiopulmonary abnormality identified.     XR TOE LEFT (MIN 2 VIEWS)  Result Date: 6/4/2025  EXAMINATION: 3 XRAY VIEWS OF THE LEFT TOE 6/4/2025 4:35 pm COMPARISON: None. HISTORY: ORDERING SYSTEM PROVIDED HISTORY: infection TECHNOLOGIST PROVIDED HISTORY: Reason for exam:->infection FINDINGS: There is no acute fracture or dislocation.  The bones are normally mineralized.  There is subtle cortical irregularity involving the 3rd distal tuft.  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  Soft tissue swelling surrounds the 3rd digit.     1. Subtle cortical irregularity involving the 3rd distal tuft can be seen in setting of osteomyelitis.           Imaging: [unfilled]         =======================================================================================  Please note that this chart entry has been generated using voice recognition software, mainly.  So please excuse brevity and/or typos.  While every effort and attempts have been made to ensure the accuracy of this automated transcription, some errors may have occurred; and certain words and phrases in transcription may not be entered as intended.  However, inadvertent computerized transcription errors may be present.  So please contact us if any clarification needed.

## 2025-06-13 ENCOUNTER — TELEPHONE (OUTPATIENT)
Dept: INTERNAL MEDICINE CLINIC | Age: 85
End: 2025-06-13

## 2025-06-16 ENCOUNTER — TELEPHONE (OUTPATIENT)
Dept: INTERNAL MEDICINE CLINIC | Age: 85
End: 2025-06-16

## 2025-06-19 ENCOUNTER — OFFICE VISIT (OUTPATIENT)
Dept: ENT CLINIC | Age: 85
End: 2025-06-19
Payer: MEDICARE

## 2025-06-19 VITALS
SYSTOLIC BLOOD PRESSURE: 153 MMHG | HEART RATE: 94 BPM | HEIGHT: 70 IN | WEIGHT: 207 LBS | DIASTOLIC BLOOD PRESSURE: 79 MMHG | OXYGEN SATURATION: 96 % | BODY MASS INDEX: 29.63 KG/M2 | TEMPERATURE: 97.5 F

## 2025-06-19 DIAGNOSIS — H90.3 BILATERAL SENSORINEURAL HEARING LOSS: ICD-10-CM

## 2025-06-19 DIAGNOSIS — J30.0 VASOMOTOR RHINITIS: Primary | ICD-10-CM

## 2025-06-19 PROCEDURE — G8417 CALC BMI ABV UP PARAM F/U: HCPCS | Performed by: STUDENT IN AN ORGANIZED HEALTH CARE EDUCATION/TRAINING PROGRAM

## 2025-06-19 PROCEDURE — 1111F DSCHRG MED/CURRENT MED MERGE: CPT | Performed by: STUDENT IN AN ORGANIZED HEALTH CARE EDUCATION/TRAINING PROGRAM

## 2025-06-19 PROCEDURE — 1036F TOBACCO NON-USER: CPT | Performed by: STUDENT IN AN ORGANIZED HEALTH CARE EDUCATION/TRAINING PROGRAM

## 2025-06-19 PROCEDURE — G8427 DOCREV CUR MEDS BY ELIG CLIN: HCPCS | Performed by: STUDENT IN AN ORGANIZED HEALTH CARE EDUCATION/TRAINING PROGRAM

## 2025-06-19 PROCEDURE — 1159F MED LIST DOCD IN RCRD: CPT | Performed by: STUDENT IN AN ORGANIZED HEALTH CARE EDUCATION/TRAINING PROGRAM

## 2025-06-19 PROCEDURE — 3078F DIAST BP <80 MM HG: CPT | Performed by: STUDENT IN AN ORGANIZED HEALTH CARE EDUCATION/TRAINING PROGRAM

## 2025-06-19 PROCEDURE — 1123F ACP DISCUSS/DSCN MKR DOCD: CPT | Performed by: STUDENT IN AN ORGANIZED HEALTH CARE EDUCATION/TRAINING PROGRAM

## 2025-06-19 PROCEDURE — 3077F SYST BP >= 140 MM HG: CPT | Performed by: STUDENT IN AN ORGANIZED HEALTH CARE EDUCATION/TRAINING PROGRAM

## 2025-06-19 PROCEDURE — 99213 OFFICE O/P EST LOW 20 MIN: CPT | Performed by: STUDENT IN AN ORGANIZED HEALTH CARE EDUCATION/TRAINING PROGRAM

## 2025-06-19 RX ORDER — IPRATROPIUM BROMIDE 42 UG/1
2 SPRAY, METERED NASAL 3 TIMES DAILY PRN
Qty: 1 EACH | Refills: 3 | Status: SHIPPED | OUTPATIENT
Start: 2025-06-19

## 2025-06-19 NOTE — PROGRESS NOTES
[facility]  DIVISION OF OTOLARYNGOLOGY- HEAD & NECK SURGERY  CLINIC FOLLOW-UP VISIT      Patient Name: Demetrio Teran  Medical Record Number:  [id number]  Primary Care Physician:  Dick Duggan MD    ChiefComplaint     Chief Complaint   Patient presents with    Ear Problem     Check ears, cleaning        History of Present Illness     Demetrio Teran is an 85 y.o. male previously seen for bilateral sensorineural hearing loss, last seen by Dr. Mcpherson on 2/29/2024.    Interval History:   Here today to see if he has any earwax buildup behind his ears.  He has a follow-up hearing test scheduled at [facility].  He is doing well with his hearing aids.  Denies otalgia or otorrhea.  He states that every time he is eats his nose runs \"like a faucet\".    Past Medical History     Past Medical History:   Diagnosis Date    Arthritis     Cancer (HCC)     skin cancer    Cerebrovascular accident (CVA) (HCC) 09/16/2022    Chronic kidney disease     Diabetes mellitus (HCC)     GERD (gastroesophageal reflux disease)     Glaucoma     Hyperlipidemia     Hypertension     Irregular heart beat     Psychiatric problem     depression and anxiety    Reflux     Thyroid disease     Ulcerative colitis (HCC)     Vitamin D deficiency        Past Surgical History     Past Surgical History:   Procedure Laterality Date    CARDIAC PACEMAKER PLACEMENT Left 2023    CATARACT REMOVAL      CHOLECYSTECTOMY, LAPAROSCOPIC  05/23/2018    with cholangiogram    COLONOSCOPY      EYE SURGERY      cataracts Glaucoma surgery    HEMORRHOID SURGERY      JOINT REPLACEMENT  03/05/2013    left knee    KNEE ARTHROSCOPY  1973 2011    right    KNEE SURGERY Left [id number]    bone fragments    KNEE SURGERY Left 2011    meniscus    MITRAL VALVE REPLACEMENT      PROSTATE SURGERY      RETINAL LASER      TOE AMPUTATION Left 06/09/2025    TOTAL DIGIT AMPUTATION LEFT THIRD TOE performed by Hernan Goldberg DPM at [facility] OR       Family History     Family History

## 2025-06-22 DIAGNOSIS — E03.4 HYPOTHYROIDISM DUE TO ACQUIRED ATROPHY OF THYROID: ICD-10-CM

## 2025-06-23 RX ORDER — LEVOTHYROXINE SODIUM 100 UG/1
100 TABLET ORAL DAILY
Qty: 90 TABLET | Refills: 0 | Status: SHIPPED | OUTPATIENT
Start: 2025-06-23

## 2025-06-23 NOTE — TELEPHONE ENCOUNTER
Last OV: 5/21/2025  Next OV: 6/26/2025    Next appointment due:on 7/2/2025     Last fill:3/26/25  Refills:0#[id number]

## 2025-06-24 DIAGNOSIS — E11.65 TYPE 2 DIABETES MELLITUS WITH HYPERGLYCEMIA, WITH LONG-TERM CURRENT USE OF INSULIN (HCC): ICD-10-CM

## 2025-06-24 DIAGNOSIS — Z79.4 TYPE 2 DIABETES MELLITUS WITH HYPERGLYCEMIA, WITH LONG-TERM CURRENT USE OF INSULIN (HCC): ICD-10-CM

## 2025-06-24 DIAGNOSIS — E78.5 DYSLIPIDEMIA: ICD-10-CM

## 2025-06-24 DIAGNOSIS — E11.9 TYPE 2 DIABETES MELLITUS WITHOUT COMPLICATION, WITHOUT LONG-TERM CURRENT USE OF INSULIN (HCC): ICD-10-CM

## 2025-06-24 DIAGNOSIS — E03.4 HYPOTHYROIDISM DUE TO ACQUIRED ATROPHY OF THYROID: ICD-10-CM

## 2025-06-24 DIAGNOSIS — Z00.00 MEDICARE ANNUAL WELLNESS VISIT, SUBSEQUENT: ICD-10-CM

## 2025-06-24 LAB
ANION GAP SERPL CALCULATED.3IONS-SCNC: 12 MMOL/L (ref 3–16)
BASOPHILS # BLD: 0.2 K/UL (ref 0–0.2)
BASOPHILS NFR BLD: 2.7 %
BUN SERPL-MCNC: 19 MG/DL (ref 7–20)
CALCIUM SERPL-MCNC: 9.6 MG/DL (ref 8.3–10.6)
CHLORIDE SERPL-SCNC: 101 MMOL/L (ref 99–110)
CHOLEST SERPL-MCNC: 149 MG/DL (ref 0–199)
CO2 SERPL-SCNC: 24 MMOL/L (ref 21–32)
CREAT SERPL-MCNC: 1.5 MG/DL (ref 0.8–1.3)
DEPRECATED RDW RBC AUTO: 16 % (ref 12.4–15.4)
EOSINOPHIL # BLD: 0.2 K/UL (ref 0–0.6)
EOSINOPHIL NFR BLD: 3.7 %
GFR SERPLBLD CREATININE-BSD FMLA CKD-EPI: 46 ML/MIN/{1.73_M2}
GLUCOSE SERPL-MCNC: 201 MG/DL (ref 70–99)
HCT VFR BLD AUTO: 45.8 % (ref 40.5–52.5)
HDLC SERPL-MCNC: 74 MG/DL (ref 40–60)
HGB BLD-MCNC: 15.5 G/DL (ref 13.5–17.5)
LDLC SERPL CALC-MCNC: 51 MG/DL
LYMPHOCYTES # BLD: 1.1 K/UL (ref 1–5.1)
LYMPHOCYTES NFR BLD: 20 %
MCH RBC QN AUTO: 28.8 PG (ref 26–34)
MCHC RBC AUTO-ENTMCNC: 33.9 G/DL (ref 31–36)
MCV RBC AUTO: 85.1 FL (ref 80–100)
MONOCYTES # BLD: 0.5 K/UL (ref 0–1.3)
MONOCYTES NFR BLD: 8.6 %
NEUTROPHILS # BLD: 3.6 K/UL (ref 1.7–7.7)
NEUTROPHILS NFR BLD: 65 %
PLATELET # BLD AUTO: 242 K/UL (ref 135–450)
PMV BLD AUTO: 8.9 FL (ref 5–10.5)
POTASSIUM SERPL-SCNC: 4.6 MMOL/L (ref 3.5–5.1)
RBC # BLD AUTO: 5.39 M/UL (ref 4.2–5.9)
SODIUM SERPL-SCNC: 137 MMOL/L (ref 136–145)
T4 FREE SERPL-MCNC: 1.2 NG/DL (ref 0.9–1.8)
TRIGL SERPL-MCNC: 120 MG/DL (ref 0–150)
TSH SERPL DL<=0.005 MIU/L-ACNC: 2.85 UIU/ML (ref 0.27–4.2)
VLDLC SERPL CALC-MCNC: 24 MG/DL
WBC # BLD AUTO: 5.5 K/UL (ref 4–11)

## 2025-06-24 RX ORDER — ROSUVASTATIN CALCIUM 20 MG/1
20 TABLET, COATED ORAL DAILY
Qty: 90 TABLET | Refills: 1 | Status: SHIPPED | OUTPATIENT
Start: 2025-06-24

## 2025-06-25 ENCOUNTER — RESULTS FOLLOW-UP (OUTPATIENT)
Dept: INTERNAL MEDICINE CLINIC | Age: 85
End: 2025-06-25

## 2025-06-25 DIAGNOSIS — N18.31 STAGE 3A CHRONIC KIDNEY DISEASE (HCC): Primary | ICD-10-CM

## 2025-06-25 LAB
EST. AVERAGE GLUCOSE BLD GHB EST-MCNC: 197.3 MG/DL
HBA1C MFR BLD: 8.5 %

## 2025-06-26 ENCOUNTER — OFFICE VISIT (OUTPATIENT)
Dept: INTERNAL MEDICINE CLINIC | Age: 85
End: 2025-06-26

## 2025-06-26 VITALS
WEIGHT: 204.2 LBS | OXYGEN SATURATION: 98 % | HEIGHT: 70 IN | SYSTOLIC BLOOD PRESSURE: 118 MMHG | DIASTOLIC BLOOD PRESSURE: 58 MMHG | HEART RATE: 82 BPM | BODY MASS INDEX: 29.23 KG/M2

## 2025-06-26 DIAGNOSIS — E11.65 TYPE 2 DIABETES MELLITUS WITH HYPERGLYCEMIA, WITH LONG-TERM CURRENT USE OF INSULIN (HCC): ICD-10-CM

## 2025-06-26 DIAGNOSIS — I10 HTN (HYPERTENSION), BENIGN: ICD-10-CM

## 2025-06-26 DIAGNOSIS — N18.31 STAGE 3A CHRONIC KIDNEY DISEASE (HCC): ICD-10-CM

## 2025-06-26 DIAGNOSIS — Z09 HOSPITAL DISCHARGE FOLLOW-UP: Primary | ICD-10-CM

## 2025-06-26 DIAGNOSIS — M86.9 OSTEOMYELITIS, UNSPECIFIED SITE, UNSPECIFIED TYPE (HCC): ICD-10-CM

## 2025-06-26 DIAGNOSIS — Z79.4 TYPE 2 DIABETES MELLITUS WITH HYPERGLYCEMIA, WITH LONG-TERM CURRENT USE OF INSULIN (HCC): ICD-10-CM

## 2025-06-26 DIAGNOSIS — E03.4 HYPOTHYROIDISM DUE TO ACQUIRED ATROPHY OF THYROID: ICD-10-CM

## 2025-06-26 DIAGNOSIS — E66.3 OVERWEIGHT (BMI 25.0-29.9): ICD-10-CM

## 2025-06-26 LAB
ANION GAP SERPL CALCULATED.3IONS-SCNC: 12 MMOL/L (ref 3–16)
BUN SERPL-MCNC: 26 MG/DL (ref 7–20)
CALCIUM SERPL-MCNC: 9.3 MG/DL (ref 8.3–10.6)
CHLORIDE SERPL-SCNC: 103 MMOL/L (ref 99–110)
CO2 SERPL-SCNC: 22 MMOL/L (ref 21–32)
CREAT SERPL-MCNC: 1.5 MG/DL (ref 0.8–1.3)
GFR SERPLBLD CREATININE-BSD FMLA CKD-EPI: 46 ML/MIN/{1.73_M2}
GLUCOSE SERPL-MCNC: 280 MG/DL (ref 70–99)
POTASSIUM SERPL-SCNC: 3.9 MMOL/L (ref 3.5–5.1)
SODIUM SERPL-SCNC: 137 MMOL/L (ref 136–145)

## 2025-06-26 RX ORDER — FUROSEMIDE 20 MG/1
20 TABLET ORAL 2 TIMES DAILY
COMMUNITY
Start: 2025-06-15

## 2025-06-26 RX ORDER — ACETAMINOPHEN 500 MG
500 TABLET ORAL 2 TIMES DAILY
COMMUNITY

## 2025-06-26 RX ORDER — DOXYCYCLINE HYCLATE 100 MG
100 TABLET ORAL 2 TIMES DAILY
COMMUNITY

## 2025-06-26 ASSESSMENT — ENCOUNTER SYMPTOMS
COUGH: 0
SINUS PAIN: 0
CHEST TIGHTNESS: 0
WHEEZING: 0
SHORTNESS OF BREATH: 0
BLOOD IN STOOL: 0
COLOR CHANGE: 0
ABDOMINAL PAIN: 0
CONSTIPATION: 0

## 2025-06-26 NOTE — PROGRESS NOTES
Post-Discharge Transitional Care Follow Up      Demetrio Teran   YOB: 1940    Date of Office Visit:  6/26/2025  Date of Hospital Admission: 6/4/25  Date of Hospital Discharge: 6/12/25  Readmission Risk Score (high >=14%. Medium >=10%):Readmission Risk Score: 14.5      Care management risk score Rising risk (score 2-5) and Complex Care (Scores >=6): No Risk Score On File     Non face to face  following discharge, date last encounter closed (first attempt may have been earlier): 06/13/2025     Call initiated 2 business days of discharge: Yes     Hospital discharge follow-up  -     OK DISCHARGE MEDS RECONCILED W/ CURRENT OUTPATIENT MED LIST  Type 2 diabetes mellitus with hyperglycemia, with long-term current use of insulin (HCC)  Overweight (BMI 25.0-29.9)  Osteomyelitis, unspecified site, unspecified type (HCC)  Hypothyroidism due to acquired atrophy of thyroid  HTN (hypertension), benign    Medical Decision Making: moderate complexity  Return in 3 months (on 9/26/2025).         Assessment & Plan  1. Type 2 Diabetes Mellitus.  - Blood glucose levels have improved from 9.1 to 8.5 but need to be <7.  - Blood pressure readings are within normal range.  - Advised to increase pre-meal insulin dosage by 4 units, totaling 16 units plus sliding scale.  - Encouraged to maintain a healthy diet and monitor glucose levels closely.    2. Osteomyelitis of the Left Third Digit.  - Hospitalized for osteomyelitis, complicated by kidney injury and hyponatremia.  - Fourth toe amputation performed on 06/09/2025.  - Antibiotic regimen changed to oral doxycycline on 06/12/2025, to continue for two more weeks.  - Advised to complete antibiotic course and follow up for stitch removal.    3. Kidney Injury.  - Kidney function is deteriorating.  - Advised to hydrate well and repeat blood test within 24 to 48 hours.  - Monitoring kidney function closely to adjust treatment as necessary.    4. Hyponatremia.  - Experienced

## 2025-06-27 ENCOUNTER — RESULTS FOLLOW-UP (OUTPATIENT)
Dept: INTERNAL MEDICINE CLINIC | Age: 85
End: 2025-06-27

## 2025-06-27 DIAGNOSIS — E11.65 TYPE 2 DIABETES MELLITUS WITH HYPERGLYCEMIA, WITH LONG-TERM CURRENT USE OF INSULIN (HCC): Primary | ICD-10-CM

## 2025-06-27 DIAGNOSIS — Z79.4 TYPE 2 DIABETES MELLITUS WITH HYPERGLYCEMIA, WITH LONG-TERM CURRENT USE OF INSULIN (HCC): Primary | ICD-10-CM

## 2025-07-01 DIAGNOSIS — Z79.4 TYPE 2 DIABETES MELLITUS WITH HYPERGLYCEMIA, WITH LONG-TERM CURRENT USE OF INSULIN (HCC): ICD-10-CM

## 2025-07-01 DIAGNOSIS — E11.65 TYPE 2 DIABETES MELLITUS WITH HYPERGLYCEMIA, WITH LONG-TERM CURRENT USE OF INSULIN (HCC): ICD-10-CM

## 2025-07-01 LAB
ANION GAP SERPL CALCULATED.3IONS-SCNC: 11 MMOL/L (ref 3–16)
BUN SERPL-MCNC: 18 MG/DL (ref 7–20)
CALCIUM SERPL-MCNC: 9.4 MG/DL (ref 8.3–10.6)
CHLORIDE SERPL-SCNC: 102 MMOL/L (ref 99–110)
CO2 SERPL-SCNC: 27 MMOL/L (ref 21–32)
CREAT SERPL-MCNC: 1.2 MG/DL (ref 0.8–1.3)
GFR SERPLBLD CREATININE-BSD FMLA CKD-EPI: 60 ML/MIN/{1.73_M2}
GLUCOSE SERPL-MCNC: 188 MG/DL (ref 70–99)
POTASSIUM SERPL-SCNC: 4.6 MMOL/L (ref 3.5–5.1)
SODIUM SERPL-SCNC: 140 MMOL/L (ref 136–145)

## 2025-07-02 ENCOUNTER — RESULTS FOLLOW-UP (OUTPATIENT)
Dept: INTERNAL MEDICINE CLINIC | Age: 85
End: 2025-07-02

## 2025-07-17 ENCOUNTER — OFFICE VISIT (OUTPATIENT)
Dept: INTERNAL MEDICINE CLINIC | Age: 85
End: 2025-07-17
Payer: MEDICARE

## 2025-07-17 VITALS
WEIGHT: 201.2 LBS | OXYGEN SATURATION: 98 % | HEART RATE: 85 BPM | HEIGHT: 70 IN | SYSTOLIC BLOOD PRESSURE: 130 MMHG | BODY MASS INDEX: 28.8 KG/M2 | DIASTOLIC BLOOD PRESSURE: 88 MMHG

## 2025-07-17 DIAGNOSIS — K64.5 THROMBOSED HEMORRHOIDS: Primary | ICD-10-CM

## 2025-07-17 DIAGNOSIS — B36.9 FUNGAL DERMATITIS: ICD-10-CM

## 2025-07-17 PROCEDURE — 1036F TOBACCO NON-USER: CPT | Performed by: INTERNAL MEDICINE

## 2025-07-17 PROCEDURE — 3075F SYST BP GE 130 - 139MM HG: CPT | Performed by: INTERNAL MEDICINE

## 2025-07-17 PROCEDURE — 1159F MED LIST DOCD IN RCRD: CPT | Performed by: INTERNAL MEDICINE

## 2025-07-17 PROCEDURE — G8417 CALC BMI ABV UP PARAM F/U: HCPCS | Performed by: INTERNAL MEDICINE

## 2025-07-17 PROCEDURE — 1123F ACP DISCUSS/DSCN MKR DOCD: CPT | Performed by: INTERNAL MEDICINE

## 2025-07-17 PROCEDURE — G8427 DOCREV CUR MEDS BY ELIG CLIN: HCPCS | Performed by: INTERNAL MEDICINE

## 2025-07-17 PROCEDURE — 3079F DIAST BP 80-89 MM HG: CPT | Performed by: INTERNAL MEDICINE

## 2025-07-17 PROCEDURE — 99214 OFFICE O/P EST MOD 30 MIN: CPT | Performed by: INTERNAL MEDICINE

## 2025-07-17 PROCEDURE — 46083 INC THROMBOSED HROID XTRNL: CPT | Performed by: INTERNAL MEDICINE

## 2025-07-17 RX ORDER — BENZOCAINE/MENTHOL 6 MG-10 MG
LOZENGE MUCOUS MEMBRANE
Qty: 30 G | Refills: 1 | Status: SHIPPED | OUTPATIENT
Start: 2025-07-17 | End: 2025-07-24

## 2025-07-17 RX ORDER — CLOTRIMAZOLE AND BETAMETHASONE DIPROPIONATE 10; .64 MG/G; MG/G
CREAM TOPICAL
Qty: 45 G | Refills: 1 | Status: SHIPPED | OUTPATIENT
Start: 2025-07-17

## 2025-07-17 NOTE — PROGRESS NOTES
Demetrio Teran (:  1940) is a 85 y.o. male,Established patient, here for evaluation of the following chief complaint(s):  rectum issues and rash on private parts       Assessment & Plan   ASSESSMENT/PLAN:  1. Thrombosed hemorrhoids  -     INCISE EXTERNAL HEMORRHOID  -     hydrocortisone (ALA-SANGEETHA) 1 % cream; Apply topically 2 times daily., Disp-30 g, R-1, Normal  2. Fungal dermatitis    Assessment & Plan  1. Yeast infection.  - Redness and a gooey appearance noted around the penis.  - Physical exam findings consistent with a yeast infection.  - Discussed the need for topical antifungal treatment.  - Topical antifungal cream prescribed.    2. Thrombosed hemorrhoid.  - Significant pain reported by the patient.  - Physical exam confirmed the presence of a thrombosed hemorrhoid.  - Discussed procedure to numb and drain the hemorrhoid for immediate relief.  - Plan to numb the area and drain the blood from the hemorrhoid.    PROCEDURE  Procedure: Drainage of thrombosed hemorrhoid, rectum    All questions were answered and agreement to proceed was given after the following Pre-Procedure details were reviewed:  - Risks and Benefits: Risks of infection, bleeding, and pain; benefits of immediate pain relief and reduction of swelling.  - Alternative Options: Conservative management with topical treatments and sitz baths.  - Side effects: Potential for temporary discomfort and minor bleeding.  - Consent: Verbal consent obtained.    Intra-Procedure:  - Time-Out: Confirmed patient's identity, procedure, and site.  - Site Preparation: Cleansed the rectal area with antiseptic solution.  - Anesthesia: Local anesthesia administered to the rectal area.  - Hemostasis: Achieved with pressure application.  - Dressing: Applied sterile dressing to the site.    Post-Procedure:  - Tolerance Level: Patient tolerated the procedure well.  - Home Care Instructions: Advised to keep the area clean and dry, apply prescribed topical

## 2025-07-18 ENCOUNTER — TELEPHONE (OUTPATIENT)
Dept: INTERNAL MEDICINE CLINIC | Age: 85
End: 2025-07-18

## 2025-07-18 NOTE — TELEPHONE ENCOUNTER
Called patient to check on status of patient post in office procedure and check for signs of bleeding, no answer- left a detailed vm with office number for return call

## 2025-07-18 NOTE — TELEPHONE ENCOUNTER
2nd attempt to reach patient to check on post in office procedure with  from yesterday. Left  for return call

## 2025-07-20 NOTE — ED NOTES
Pt report given to OCTAVIA Lloyd, RN, states no questions or concerns at this time. Pt transported to floor via wheelchair by floor RN on portable telemetry.       175 Sonia Alex RN  09/15/22 [id number]
jimmy Suggs [phone], [address] [facility]  09/15/22 [id number]
eye vision change

## 2025-07-28 PROBLEM — Z86.73 HISTORY OF STROKE: Status: ACTIVE | Noted: 2022-09-16

## 2025-08-19 ENCOUNTER — OFFICE VISIT (OUTPATIENT)
Dept: SURGERY | Age: 85
End: 2025-08-19
Payer: MEDICARE

## 2025-08-19 VITALS
SYSTOLIC BLOOD PRESSURE: 130 MMHG | DIASTOLIC BLOOD PRESSURE: 65 MMHG | WEIGHT: 210 LBS | HEART RATE: 84 BPM | HEIGHT: 70 IN | BODY MASS INDEX: 30.06 KG/M2 | OXYGEN SATURATION: 97 %

## 2025-08-19 DIAGNOSIS — K61.0 PERIANAL ABSCESS: Primary | ICD-10-CM

## 2025-08-19 PROCEDURE — 99204 OFFICE O/P NEW MOD 45 MIN: CPT | Performed by: SURGERY

## 2025-08-19 PROCEDURE — G8417 CALC BMI ABV UP PARAM F/U: HCPCS | Performed by: SURGERY

## 2025-08-19 PROCEDURE — 1036F TOBACCO NON-USER: CPT | Performed by: SURGERY

## 2025-08-19 PROCEDURE — G8427 DOCREV CUR MEDS BY ELIG CLIN: HCPCS | Performed by: SURGERY

## 2025-08-19 PROCEDURE — 1123F ACP DISCUSS/DSCN MKR DOCD: CPT | Performed by: SURGERY

## 2025-08-19 PROCEDURE — 3075F SYST BP GE 130 - 139MM HG: CPT | Performed by: SURGERY

## 2025-08-19 PROCEDURE — 3078F DIAST BP <80 MM HG: CPT | Performed by: SURGERY

## 2025-08-19 PROCEDURE — 1159F MED LIST DOCD IN RCRD: CPT | Performed by: SURGERY

## 2025-08-19 RX ORDER — DAPAGLIFLOZIN 10 MG/1
10 TABLET, FILM COATED ORAL DAILY
COMMUNITY
Start: 2025-08-13

## 2025-08-19 RX ORDER — SPIRONOLACTONE 50 MG/1
TABLET, FILM COATED ORAL
COMMUNITY

## 2025-08-19 RX ORDER — DONEPEZIL HYDROCHLORIDE 5 MG/1
TABLET, FILM COATED ORAL
COMMUNITY
Start: 2025-07-18

## 2025-08-19 RX ORDER — IBUPROFEN 600 MG/1
TABLET ORAL
COMMUNITY
Start: 2025-07-21

## 2025-08-19 RX ORDER — INSULIN HUMAN 4-8-12(60)
KIT INHALATION
COMMUNITY
Start: 2025-08-04

## 2025-08-19 RX ORDER — ASPIRIN 81 MG/1
162 TABLET ORAL DAILY
COMMUNITY

## (undated) DEVICE — HYPODERMIC SAFETY NEEDLE: Brand: MAGELLAN

## (undated) DEVICE — LOWER EXTREMITY: Brand: MEDLINE INDUSTRIES, INC.

## (undated) DEVICE — PADDING CAST W4INXL4YD ST COT RAYON MICROPLEATED HIGHLY

## (undated) DEVICE — SYRINGE MED 10ML LUERLOCK TIP W/O SFTY DISP

## (undated) DEVICE — DRAPE C ARM W54XL85IN MINI WITH POLY STRAP FOR FLUOROSCAN

## (undated) DEVICE — STOCKINETTE,IMPERVIOUS,12X48,STERILE: Brand: MEDLINE

## (undated) DEVICE — BANDAGE COMPR W4INXL10YD WHITE/BEIGE E MTRX HK LOOP CLSR

## (undated) DEVICE — WET SKIN PREP TRAY: Brand: MEDLINE INDUSTRIES, INC.

## (undated) DEVICE — SUTURE ETHILON SZ 3-0 L18IN NONABSORBABLE BLK PS-2 L19MM 3/8 1669H